# Patient Record
Sex: MALE | Race: ASIAN | NOT HISPANIC OR LATINO | Employment: FULL TIME | URBAN - METROPOLITAN AREA
[De-identification: names, ages, dates, MRNs, and addresses within clinical notes are randomized per-mention and may not be internally consistent; named-entity substitution may affect disease eponyms.]

---

## 2017-08-26 ENCOUNTER — HOSPITAL ENCOUNTER (EMERGENCY)
Facility: HOSPITAL | Age: 47
Discharge: HOME | End: 2017-08-26
Attending: EMERGENCY MEDICINE
Payer: COMMERCIAL

## 2017-08-26 VITALS
RESPIRATION RATE: 16 BRPM | WEIGHT: 130 LBS | TEMPERATURE: 98 F | HEIGHT: 64 IN | OXYGEN SATURATION: 98 % | SYSTOLIC BLOOD PRESSURE: 127 MMHG | DIASTOLIC BLOOD PRESSURE: 80 MMHG | HEART RATE: 55 BPM | BODY MASS INDEX: 22.2 KG/M2

## 2017-08-26 DIAGNOSIS — R19.7 DIARRHEA, UNSPECIFIED TYPE: ICD-10-CM

## 2017-08-26 DIAGNOSIS — M54.50 LUMBAR BACK PAIN: ICD-10-CM

## 2017-08-26 DIAGNOSIS — S39.012A LUMBAR STRAIN, INITIAL ENCOUNTER: Primary | ICD-10-CM

## 2017-08-26 PROCEDURE — 63600175 PHARM REV CODE 636 W HCPCS: Performed by: EMERGENCY MEDICINE

## 2017-08-26 PROCEDURE — 25000003 PHARM REV CODE 250: Performed by: EMERGENCY MEDICINE

## 2017-08-26 PROCEDURE — 96372 THER/PROPH/DIAG INJ SC/IM: CPT

## 2017-08-26 PROCEDURE — 99283 EMERGENCY DEPT VISIT LOW MDM: CPT | Mod: 25

## 2017-08-26 RX ORDER — IBUPROFEN 800 MG/1
800 TABLET ORAL 3 TIMES DAILY
COMMUNITY
End: 2017-09-28 | Stop reason: ALTCHOICE

## 2017-08-26 RX ORDER — ONDANSETRON 4 MG/1
4 TABLET, ORALLY DISINTEGRATING ORAL
Status: COMPLETED | OUTPATIENT
Start: 2017-08-26 | End: 2017-08-26

## 2017-08-26 RX ORDER — HYDROCODONE BITARTRATE AND ACETAMINOPHEN 5; 325 MG/1; MG/1
1 TABLET ORAL EVERY 4 HOURS PRN
Qty: 15 TABLET | Refills: 0 | Status: SHIPPED | OUTPATIENT
Start: 2017-08-26 | End: 2017-09-05

## 2017-08-26 RX ORDER — METHOCARBAMOL 750 MG/1
500 TABLET, FILM COATED ORAL 4 TIMES DAILY
COMMUNITY
End: 2017-09-28

## 2017-08-26 RX ORDER — BETAMETHASONE SODIUM PHOSPHATE AND BETAMETHASONE ACETATE 3; 3 MG/ML; MG/ML
12 INJECTION, SUSPENSION INTRA-ARTICULAR; INTRALESIONAL; INTRAMUSCULAR; SOFT TISSUE
Status: COMPLETED | OUTPATIENT
Start: 2017-08-26 | End: 2017-08-26

## 2017-08-26 RX ORDER — HYDROMORPHONE HYDROCHLORIDE 2 MG/ML
1 INJECTION, SOLUTION INTRAMUSCULAR; INTRAVENOUS; SUBCUTANEOUS
Status: COMPLETED | OUTPATIENT
Start: 2017-08-26 | End: 2017-08-26

## 2017-08-26 RX ORDER — CIPROFLOXACIN 500 MG/1
500 TABLET ORAL 2 TIMES DAILY
Qty: 6 TABLET | Refills: 0 | Status: SHIPPED | OUTPATIENT
Start: 2017-08-26 | End: 2017-08-29

## 2017-08-26 RX ADMIN — HYDROMORPHONE HYDROCHLORIDE 1 MG: 2 INJECTION INTRAMUSCULAR; INTRAVENOUS; SUBCUTANEOUS at 07:08

## 2017-08-26 RX ADMIN — ONDANSETRON 4 MG: 4 TABLET, ORALLY DISINTEGRATING ORAL at 07:08

## 2017-08-26 RX ADMIN — BETAMETHASONE SODIUM PHOSPHATE AND BETAMETHASONE ACETATE 12 MG: 3; 3 INJECTION, SUSPENSION INTRA-ARTICULAR; INTRALESIONAL; INTRAMUSCULAR at 07:08

## 2017-08-26 NOTE — ED TRIAGE NOTES
Pt seen in ED complaining of lower back pain. Pt stated he bent down to picked up boxes at work and that's when he hurt his back.

## 2017-08-26 NOTE — DISCHARGE INSTRUCTIONS
Lots of clear liquids.  Please use a heating pad on your back.  Rest.  Please follow-up with the primary care doctor in 3 days.  Hydrocodone for pain.  Cipro as directed.

## 2017-08-26 NOTE — ED PROVIDER NOTES
"Encounter Date: 8/26/2017    SCRIBE #1 NOTE: I, Gwendolyn Esau, am scribing for, and in the presence of,  Charles Narvaez MD. I have scribed the following portions of the note - Other sections scribed: HPI and ROS.       History     Chief Complaint   Patient presents with    Back Pain     x1 day; reports taking advil but no relief; pt also received Methocarbamol 750 and Ibuprofen 800 from clinic but no relief     CC: Back Pain    HPI: This 47 y.o. male with no medical history presents to the ED c/o non-radiating lower back pain that began at about 5:15 am yesterday morning. Pt states, "my lower waist is killing me", reporting that the symptoms began after he attempted to  a box. Symptoms are acute, constant and severe (10/10). He reports taking Ibuprofen and Flexeril for treatment without any relief. Pt denies any falls or trauma. He also c/o experiencing 2-3x episodes of diarrhea a day for the last 1x week, noting that the stool is "watery". Pt denies bowel or bladder incontinence, fever, chills, headache, ear pain, sore throat, chest pain, cough and rash. Pt also denies smoking and alcohol usage. No other associated symptoms.           The history is provided by the patient. No  was used.     Review of patient's allergies indicates:  No Known Allergies  History reviewed. No pertinent past medical history.  History reviewed. No pertinent surgical history.  History reviewed. No pertinent family history.  Social History   Substance Use Topics    Smoking status: Never Smoker    Smokeless tobacco: Never Used    Alcohol use No     Review of Systems   Constitutional: Negative for chills and fever.   HENT: Negative for ear pain, rhinorrhea and sore throat.    Eyes: Negative for redness.   Respiratory: Negative for cough.    Cardiovascular: Negative for chest pain.   Gastrointestinal: Positive for diarrhea (2-3x episodes a day). Negative for abdominal pain, nausea and vomiting. "   Genitourinary: Negative for dysuria.   Musculoskeletal: Positive for back pain (lower).   Skin: Negative for color change and rash.   Neurological: Negative for syncope, weakness and headaches.       Physical Exam     Initial Vitals [08/26/17 0615]   BP Pulse Resp Temp SpO2   (!) 141/81 65 18 97.7 °F (36.5 °C) 98 %      MAP       101         Physical Exam  The patient was examined specifically for the following:   General:No significant distress, Good color, Warm and dry. Head and neck:Scalp atraumatic, Neck supple. Neurological:Appropriate conversation, Gross motor deficits. Eyes:Conjugate gaze, Clear corneas. ENT: No epistaxis. Cardiac: Regular rate and rhythm, Grossly normal heart tones. Pulmonary: Wheezing, Rales. Gastrointestinal: Abdominal tenderness, Abdominal distention. Musculoskeletal: Extremity deformity, Apparent pain with range of motion of the joints. Skin: Rash.   The findings on examination were normal except for the following: The patient is splinting his lumbar back.  He is mildly diffuse tenderness of the lumbar back.  He is wearing a brace.  The lungs are clear and free wheezing rales rubs and rhonchi.  Heart tones are normal.  The patient has regular rate and rhythm.  The abdomen is nontender.  Lower extremities are nontender.  There is no pale range of motion of any joints.  Lower extremity neurologic examination is unremarkable.  The pain is intensely positional.  ED Course   Procedures  Labs Reviewed - No data to display       Medical decision making: Given the above, this patient presents emergency with lumbar back pain that had its onset after he tried to lift a heavy box.  He has no lower extremity neurologic deficits bowel or bladder symptoms.  I doubt cauda equina syndrome.  Lumbar disc disease as the differential with sprain and strain of the lumbar back.  I doubt fracture.  There is no history of trauma.  I will treat this patient with IM steroids and hydrocodone.  Patient is having  3 watery bowel movements a day for the last 7 days.  This does not quite meet the threshold for diarrhea.  He relates he cannot give a stool sample.  I will treat him empirically with Cipro for 3 days.  X-Rays:   Independently Interpreted Readings:   Other Readings:  Services of the lumbar spine failed to reveal significant abnormalities.               Scribe Attestation:   Scribe #1: I performed the above scribed service and the documentation accurately describes the services I performed. I attest to the accuracy of the note.    Attending Attestation:           Physician Attestation for Scribe:  Physician Attestation Statement for Scribe #1: I, Charles Narvaez MD, reviewed documentation, as scribed by Gwendolyn Cifuentes in my presence, and it is both accurate and complete.                 ED Course     Clinical Impression:   The primary encounter diagnosis was Lumbar strain, initial encounter. Diagnoses of Diarrhea, unspecified type and Lumbar back pain were also pertinent to this visit.                           Charles Narvaez MD  08/26/17 0895

## 2017-09-21 ENCOUNTER — OFFICE VISIT (OUTPATIENT)
Dept: URGENT CARE | Facility: CLINIC | Age: 47
End: 2017-09-21
Payer: COMMERCIAL

## 2017-09-21 VITALS
HEART RATE: 69 BPM | TEMPERATURE: 98 F | WEIGHT: 134 LBS | SYSTOLIC BLOOD PRESSURE: 136 MMHG | BODY MASS INDEX: 22.88 KG/M2 | DIASTOLIC BLOOD PRESSURE: 86 MMHG | HEIGHT: 64 IN

## 2017-09-21 DIAGNOSIS — Z02.83 ENCOUNTER FOR DRUG SCREENING: ICD-10-CM

## 2017-09-21 DIAGNOSIS — S39.012A LUMBAR STRAIN, INITIAL ENCOUNTER: Primary | ICD-10-CM

## 2017-09-21 DIAGNOSIS — Z13.39 ALCOHOL SCREENING: ICD-10-CM

## 2017-09-21 LAB — POC BREATH ALCOHOL: NEGATIVE

## 2017-09-21 PROCEDURE — 3008F BODY MASS INDEX DOCD: CPT | Mod: S$GLB,,, | Performed by: PHYSICIAN ASSISTANT

## 2017-09-21 PROCEDURE — 82075 ASSAY OF BREATH ETHANOL: CPT | Mod: S$GLB,,, | Performed by: PREVENTIVE MEDICINE

## 2017-09-21 PROCEDURE — 99203 OFFICE O/P NEW LOW 30 MIN: CPT | Mod: S$GLB,,, | Performed by: PHYSICIAN ASSISTANT

## 2017-09-21 PROCEDURE — 80305 DRUG TEST PRSMV DIR OPT OBS: CPT | Mod: S$GLB,,, | Performed by: PREVENTIVE MEDICINE

## 2017-09-21 RX ORDER — NAPROXEN 500 MG/1
500 TABLET ORAL 2 TIMES DAILY
COMMUNITY
End: 2017-09-28 | Stop reason: SDUPTHER

## 2017-09-21 RX ORDER — HYDROCODONE BITARTRATE AND ACETAMINOPHEN 5; 325 MG/1; MG/1
1 TABLET ORAL EVERY 6 HOURS PRN
COMMUNITY
End: 2023-10-27

## 2017-09-21 NOTE — LETTER
[facility] [place]  [address], [address]  [place] [facility] [place] [address]  Phone: [phone]  Fax: [phone]    Pt Name: Mehul Randhawa  Injury Date: 8/25/2017   Employee ID:  Date : 09/21/2017   Company: [employer]            Appointment Time: 08:25 AM Arrived:  8:40 AM CDT   Physician: Arnoldo Alberts PA-C Check out:11:30 AM           Office Treatment: Mehul was seen today for back pain.    Diagnoses and all orders for this visit:    Lumbar strain, initial encounter    Encounter for drug screening    Alcohol screening  -     POCT alcohol breath test       Patient Instructions: PT to be scheduled once authorized, Daily home exercises/warm soaks      WORK STATUS: LIGHT DUTY   No lifting/pushing/pulling more than 10 lbs,  Avoid frequent bending/lifting/twisting,  Avoid climbing/kneeling/squatting       Return Appointment: 9/28/2017@10AM

## 2017-09-21 NOTE — PROGRESS NOTES
Subjective:       Patient ID: Mehul Randhawa is a 47 y.o. male.    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 He reports while packing a box with the belts, lifting, carrying, and bending he injured his low back. He c/o constant low back pain with intermittent bilateral hip/groin tingling and pinching sensation that is worst with prolonged sitting and standing, and bending. Pt reports going to ED on 8/26, and followed up with his PCP. Pt states that he had an MRI of his back at [facility]. He states that he has been gradually getting better since doi. Sn: pt concerned about kidney stones. ajd      Back Pain   The current episode started 1 to 4 weeks ago. The problem occurs constantly. The problem has been gradually improving since onset. The pain is present in the lumbar spine. The quality of the pain is described as aching and stabbing. The pain is at a severity of 5/10. The pain is moderate. The symptoms are aggravated by bending, lying down, standing and sitting. Pertinent negatives include no abdominal pain, bladder incontinence, bowel incontinence, chest pain, dysuria, fever, numbness or paresthesias. He has tried NSAIDs and muscle relaxant for the symptoms. The treatment provided mild relief.     Review of Systems   Constitution: Negative for chills, fever and malaise/fatigue.   HENT: Negative for hearing loss and nosebleeds.    Eyes: Negative for blurred vision and redness.   Cardiovascular: Negative for chest pain and syncope.   Respiratory: Negative for cough and shortness of breath.    Skin: Negative for itching and rash.   Musculoskeletal: Positive for back pain. Negative for joint pain, muscle cramps, muscle weakness and stiffness.   Gastrointestinal: Negative for abdominal pain and bowel incontinence.   Genitourinary: Negative for bladder incontinence, dysuria, hematuria and urgency.   Neurological:  Negative for disturbances in coordination, numbness and paresthesias.   Psychiatric/Behavioral: The patient is not nervous/anxious.    All other systems reviewed and are negative.      Objective:      Physical Exam   Constitutional: He appears well-developed and well-nourished. He is active. No distress.   HENT:   Head: Normocephalic and atraumatic.   Right Ear: Hearing and external ear normal.   Left Ear: Hearing and external ear normal.   Nose: Nose normal. No nasal deformity. No epistaxis.   Mouth/Throat: Oropharynx is clear and moist and mucous membranes are normal.   Eyes: Conjunctivae and lids are normal. Right conjunctiva is not injected. Left conjunctiva is not injected.   Neck: Trachea normal and normal range of motion. No spinous process tenderness and no muscular tenderness present.   Cardiovascular: Intact distal pulses and normal pulses.    Pulses:       Dorsalis pedis pulses are 2+ on the right side, and 2+ on the left side.        Posterior tibial pulses are 2+ on the right side, and 2+ on the left side.   Pulmonary/Chest: Effort normal. No stridor. No respiratory distress.   Abdominal: Normal appearance.   Musculoskeletal:        Cervical back: Normal.        Thoracic back: Normal.        Lumbar back: He exhibits decreased range of motion and pain (with range of motion activity). He exhibits no tenderness, no deformity and normal pulse.   Neurological: He is alert. He has normal strength and normal reflexes. No sensory deficit. GCS eye subscore is 4. GCS verbal subscore is 5. GCS motor subscore is 6.   Reflex Scores:       Patellar reflexes are 2+ on the right side and 2+ on the left side.       Achilles reflexes are 2+ on the right side and 2+ on the left side.  SLR negative bilaterally.    Skin: Skin is warm, dry and intact. No abrasion and no bruising noted.   Psychiatric: He has a normal mood and affect. His speech is normal and behavior is normal. Thought content normal. Cognition and memory are  normal. He is attentive.   Nursing note and vitals reviewed.      Assessment:       1. Lumbar strain, initial encounter    2. Encounter for drug screening    3. Alcohol screening        Plan:       Continue Naproxen twice daily. Take pain meds off duty only.     I reviewed his MRI which shows no significant abnormalities.      Patient Instructions: PT to be scheduled once authorized, Daily home exercises/warm soaks   Restrictions: No lifting/pushing/pulling more than 10 lbs, Avoid frequent bending/lifting/twisting, Avoid climbing/kneeling/squatting  Return in about 7 days (around 9/28/2017).

## 2017-09-21 NOTE — PATIENT INSTRUCTIONS
Back Sprain or Strain    Injury to the muscles (strain) or ligaments (sprain) around the spine can be troubling. Injury may occur after a sudden forceful twisting or bending force such as in a car accident, after a simple awkward movement, or after lifting something heavy with poor body positioning. In any case, muscle spasm is often present and adds to the pain.  Thankfully, most people feel better in 1 to 2 weeks, and most of the rest in 1 to 2 months. Most people can remain active. Unless you had a forceful or traumatic physical injury such as a car accident or fall, X-rays may not be ordered for the first evaluation of a back sprain or strain. If pain continues and does not respond to medical treatment, your healthcare provider may then order X-rays and other tests.  Home care  The following guidelines will help you care for your injury at home:  · When in bed, try to find a comfortable position. A firm mattress is best. Try lying flat on your back with pillows under your knees. You can also try lying on your side with your knees bent up toward your chest and a pillow between your knees.  · Don't sit for long periods. Try not to take long car rides or take other trips that have you sitting for a long time. This puts more stress on the lower back than standing or walking.  · During the first 24 to 72 hours after an injury or flare-up, apply an ice pack to the painful area for 20 minutes. Then remove it for 20 minutes. Do this for 60 to 90 minutes, or several times a day. This will reduce swelling and pain. Be sure to wrap the ice pack in a thin towel or plastic to protect your skin.  · You can start with ice, then switch to heat. Heat from a hot shower, hot bath, or heating pad reduces pain and works well for muscle spasms. Put heat on the painful area for 20 minutes, then remove for 20 minutes. Do this for 60 to 90 minutes, or several times a day. Do not use a heating pad while sleeping. It can burn the  skin.  · You can alternate the ice and heat. Talk with your healthcare provider to find out the best treatment or therapy for your back pain.  · Therapeutic massage will help relax the back muscles without stretching them.  · Be aware of safe lifting methods. Do not lift anything over 15 pounds until all of the pain is gone.  Medicines  Talk to your healthcare provider before using medicines, especially if you have other health problems or are taking other medicines.  · You may use acetaminophen or ibuprofen to control pain, unless another pain medicine was prescribed. If you have chronic conditions like diabetes, liver or kidney disease, stomach ulcers, or gastrointestinal bleeding, or are taking blood-thinner medicines, talk with your doctor before taking any medicines.  · Be careful if you are given prescription medicines, narcotics, or medicine for muscle spasm. They can cause drowsiness, and affect your coordination, reflexes, and judgment. Do not drive or operate heavy machinery when taking these types of medicines. Only take pain medicine as prescribed by your healthcare provider.  Follow-up care  Follow up with your healthcare provider, or as advised. You may need physical therapy or more tests if your symptoms get worse.  If you had X-rays your healthcare provider may be checking for any broken bones, breaks, or fractures. Bruises and sprains can sometimes hurt as much as a fracture. These injuries can take time to heal completely. If your symptoms dont improve or they get worse, talk with your healthcare provider. You may need a repeat X-ray or other tests.  Call 911  Call for emergency care if any of the following occur:  · Trouble breathing  · Confused  · Very drowsy or trouble awakening  · Fainting or loss of consciousness  · Rapid or very slow heart rate  · Loss of bowel or bladder control  When to seek medical advice  Call your healthcare provider right away if any of the following occur:  · Pain  gets worse or spreads to your arms or legs  · Weakness or numbness in one or both arms or legs  · Numbness in the groin or genital area  Date Last Reviewed: 6/1/2016 © 2000-2017 [employer]. [address] [place] [address]. All rights reserved. This information is not intended as a substitute for professional medical care. Always follow your healthcare professional's instructions.        Lumbar Extension (Flexibility)    1. Lie face down on your stomach, forehead on the floor. You can lie on a mat or towel.  2. Bend your arms next to your body and lift your upper body up on your forearms. Your palms and forearms should be flat on the floor. Keep your stomach and hips on the floor.  3. Hold your upper body up with your forearms for 20 seconds. Slowly lower back down to the floor.  4. Repeat 2 times, or as instructed.  Date Last Reviewed: 3/10/2016  © 7839-2062 [employer]. [address] [place] [address]. All rights reserved. This information is not intended as a substitute for professional medical care. Always follow your healthcare professional's instructions.        Lumbar Flexion (Flexibility)    5. Lie on your back on the floor, with your knees bent and your feet flat on the floor.  6. Gently pull your knees up toward your chest. Put your hands under your thighs to help pull your knees up.  7. Press your lower back down to the floor. Hold for 20 seconds.  8. Lower your legs back down to the floor and relax.  9. Repeat 2 times, or as instructed.  Date Last Reviewed: 3/10/2016  © 9643-0951 [employer]. [address] [place] [address]. All rights reserved. This information is not intended as a substitute for professional medical care. Always follow your healthcare professional's instructions.        Lumbar Rotation    10. Lie on your back on the floor, with your knees bent and your feet flat on the floor. Dont press your neck or lower back to  the floor.  11. Lean both of your knees to one side. Turn your head in the opposite direction. Keep your shoulders flat on the floor. Be gentle and dont push through pain.  12. Hold for 20 seconds. Then slowly move your knees and head in the other direction.  13. Repeat 2 to 5 times, or as instructed.   Date Last Reviewed: 3/10/2016  © 9667-4343 [employer]. [address] [place] [address]. All rights reserved. This information is not intended as a substitute for professional medical care. Always follow your healthcare professional's instructions.        Lumbar Stretch (Flexibility)    14. Lie on your back on the floor, with your knees bent and your feet flat on the floor. Dont press your neck or lower back to the floor.  15. Pull one knee up toward your chest. Clasp your hands under your thigh to help pull.  16. Hold for 30 to 60 seconds. Lower your leg back down to the floor.  17. Repeat 2 times, or as instructed.  18. Switch legs and repeat.  Date Last Reviewed: 3/10/2016  © 1268-3884 [employer]. [address] [place] [address]. All rights reserved. This information is not intended as a substitute for professional medical care. Always follow your healthcare professional's instructions.        Understanding Lumbosacral Strain    Lumbosacral strain is a medical term for an injury that causes low back pain. The lumbosacral area (low back) is between the bottom of the ribcage and the top of the buttocks. A strain is tearing of muscles and tendons. These tears can be very small but still cause pain.  How a lumbosacral strain happens  Muscles and tendons connected to the spine can be strained in a number of ways:  · Sitting or standing in the same position for long periods of time. This can harm the low back over time. Poor posture can make low back pain more likely.  · Moving the muscles and tendons past their usual range of motion. This can cause a sudden injury.  This can happen when you twist, bend over, or lift something heavy. Not using correct technique for sports or tasks like lifting can make back injury more likely.  · Accidents or falls  Lumbosacral strain can be caused by other problems, but these are less common.  Symptoms of lumbosacral strain  Symptoms may include:  · Pain in the back, often on one side  · Pain that gets worse with movement and gets better with rest  · Inability to move as freely as usual  · Swelling, slight redness, and skin warmth in the painful area  Treatment for lumbosacral strain  Low back pain often goes away by itself within several weeks. But it often comes back. Treatment focuses on reducing pain and avoiding further injury. Bed rest is usually not recommended for low back pain. Treatments may include:  · Avoiding or changing the action that caused the problem. This helps prevent injuring the tissues again.  · Prescription or over-the-counter pain medicines. These help reduce inflammation, swelling, and pain.  · Cold or heat packs. These help reduce pain and swelling.  · Stretching and other exercises. These improve flexibility and strength.  · Physical therapy. This usually includes exercises and other treatments.  · Injections of medicine. This may relieve symptoms.  If these treatments do not relieve symptoms, your healthcare provider may order imaging tests to learn more about the problem. Sometimes you may need surgery.  Possible complications of lumbosacral strain  If the cause of the pain is not addressed, symptoms may return or get worse. Follow your healthcare providers instructions on lifestyle changes and treating your back.     When to call your healthcare provider  Call your healthcare provider right away if you have any of these:  · Fever of 100.4°F (38°C) or higher, or as directed  · Numbness, tingling, or weakness  · Problems with bowel or bladder control, or problems having sex  · Pain that does not go away, or gets  worse  · New symptoms   Date Last Reviewed: 3/10/2016  © 7597-0866 [employer]. [address] [place] [address], [place] [address]. All rights reserved. This information is not intended as a substitute for professional medical care. Always follow your healthcare professional's instructions.

## 2017-09-28 ENCOUNTER — OFFICE VISIT (OUTPATIENT)
Dept: URGENT CARE | Facility: CLINIC | Age: 47
End: 2017-09-28
Payer: COMMERCIAL

## 2017-09-28 DIAGNOSIS — S39.012D LUMBAR STRAIN, SUBSEQUENT ENCOUNTER: Primary | ICD-10-CM

## 2017-09-28 PROCEDURE — 99214 OFFICE O/P EST MOD 30 MIN: CPT | Mod: S$GLB,,, | Performed by: PHYSICIAN ASSISTANT

## 2017-09-28 PROCEDURE — 3008F BODY MASS INDEX DOCD: CPT | Mod: S$GLB,,, | Performed by: PHYSICIAN ASSISTANT

## 2017-09-28 RX ORDER — NAPROXEN 500 MG/1
500 TABLET ORAL 2 TIMES DAILY WITH MEALS
Qty: 30 TABLET | Refills: 0 | Status: SHIPPED | OUTPATIENT
Start: 2017-09-28 | End: 2017-10-19 | Stop reason: SDUPTHER

## 2017-09-28 RX ORDER — ERGOCALCIFEROL 1.25 MG/1
CAPSULE ORAL
COMMUNITY
Start: 2017-09-05 | End: 2023-10-27

## 2017-09-28 RX ORDER — CYCLOBENZAPRINE HCL 10 MG
TABLET ORAL
COMMUNITY
Start: 2017-08-29 | End: 2017-09-28 | Stop reason: ALTCHOICE

## 2017-09-28 RX ORDER — ORPHENADRINE CITRATE 100 MG/1
100 TABLET, EXTENDED RELEASE ORAL 2 TIMES DAILY PRN
Qty: 20 TABLET | Refills: 0 | Status: SHIPPED | OUTPATIENT
Start: 2017-09-28 | End: 2017-10-08

## 2017-09-28 NOTE — PROGRESS NOTES
Subjective:       Patient ID: Mehul Randhawa is a 47 y.o. male.    Chief Complaint: Back Pain    Patient present for f/u for back pain (8/25/2017) He c/o intermittent low back pain. Pt has been wearing a back brace for support while working. He states that after about 2 hours of wearing, he begins to have back pain.      Back Pain   The current episode started more than 1 month ago. The problem occurs intermittently. The problem is unchanged. The pain is present in the lumbar spine. The pain is at a severity of 5/10. The pain is moderate. The symptoms are aggravated by bending, standing and sitting. Pertinent negatives include no abdominal pain, bladder incontinence, bowel incontinence, chest pain, dysuria, fever, numbness or paresthesias. He has tried NSAIDs, muscle relaxant and analgesics for the symptoms. The treatment provided moderate relief.     Review of Systems   Constitution: Negative for chills, fever and malaise/fatigue.   HENT: Negative for hearing loss and nosebleeds.    Eyes: Negative for blurred vision and redness.   Cardiovascular: Negative for chest pain and syncope.   Respiratory: Negative for cough and shortness of breath.    Skin: Negative for itching and rash.   Musculoskeletal: Positive for back pain. Negative for joint pain, muscle cramps, muscle weakness and stiffness.   Gastrointestinal: Negative for abdominal pain and bowel incontinence.   Genitourinary: Negative for bladder incontinence, dysuria, hematuria and urgency.   Neurological: Negative for disturbances in coordination, numbness and paresthesias.   Psychiatric/Behavioral: The patient is not nervous/anxious.    All other systems reviewed and are negative.      Objective:      Physical Exam   Constitutional: He appears well-developed and well-nourished. He is active. No distress.   HENT:   Head: Normocephalic and atraumatic.   Right Ear: Hearing and external ear normal.   Left Ear: Hearing and external ear normal.   Nose: Nose normal. No  nasal deformity. No epistaxis.   Mouth/Throat: Oropharynx is clear and moist and mucous membranes are normal.   Eyes: Conjunctivae and lids are normal. Right conjunctiva is not injected. Left conjunctiva is not injected.   Neck: Trachea normal and normal range of motion. No spinous process tenderness and no muscular tenderness present.   Cardiovascular: Intact distal pulses and normal pulses.    Pulses:       Dorsalis pedis pulses are 2+ on the right side, and 2+ on the left side.        Posterior tibial pulses are 2+ on the right side, and 2+ on the left side.   Pulmonary/Chest: Effort normal. No stridor. No respiratory distress.   Abdominal: Normal appearance.   Musculoskeletal:        Cervical back: Normal.        Thoracic back: Normal.        Lumbar back: He exhibits decreased range of motion and tenderness. He exhibits no deformity and normal pulse.   Neurological: He is alert. He has normal strength and normal reflexes. No sensory deficit. GCS eye subscore is 4. GCS verbal subscore is 5. GCS motor subscore is 6.   Reflex Scores:       Patellar reflexes are 2+ on the right side and 2+ on the left side.       Achilles reflexes are 2+ on the right side and 2+ on the left side.  SLR negative bilaterally.    Skin: Skin is warm, dry and intact. No abrasion and no bruising noted.   Psychiatric: He has a normal mood and affect. His speech is normal and behavior is normal. Thought content normal. Cognition and memory are normal. He is attentive.   Nursing note reviewed.      Assessment:       1. Lumbar strain, subsequent encounter        Plan:           Medications Ordered This Encounter      naproxen (NAPROSYN) 500 MG tablet          Sig: Take 1 tablet (500 mg total) by mouth 2 (two) times daily with meals.          Dispense:  30 tablet          Refill:  0      orphenadrine (NORFLEX) 100 mg tablet          Sig: Take 1 tablet (100 mg total) by mouth 2 (two) times daily as needed for Muscle spasms or Pain. Take off duty  only. May cause drowsiness.          Dispense:  20 tablet          Refill:  0  Patient Instructions: Daily home exercises/warm soaks, PT to be scheduled once authorized   Restrictions: Avoid frequent bending/lifting/twisting, No lifting/pushing/pulling more than 10 lbs, Avoid climbing/kneeling/squatting  Return in about 7 days (around 10/5/2017).

## 2017-09-28 NOTE — LETTER
[facility]  [address], [address]  [facility] [address]  Phone: [phone]  Fax: [phone]    Pt Name: Mehul Randhawa  Injury Date: 8/25/2017   Employee ID:  Date: 09/28/2017   Company: [employer]            Appointment Time: 10:00 AM Arrived: 10:00 AM CDT   Physician: Arnoldo Alberts PA-C Check out: 11:09 AM           Office Treatment: Mehul was seen today for back pain.    Diagnoses and all orders for this visit:    Lumbar strain, subsequent encounter  -     naproxen (NAPROSYN) 500 MG tablet; Take 1 tablet (500 mg total) by mouth 2 (two) times daily with meals.  -     orphenadrine (NORFLEX) 100 mg tablet; Take 1 tablet (100 mg total) by mouth 2 (two) times daily as needed for Muscle spasms or Pain. Take off duty only. May cause drowsiness.       Patient Instructions: Daily home exercises/warm soaks, PT to be scheduled once authorized    WORK STATUS: LIGHT DUTY  Avoid frequent bending/lifting/twisting,   No lifting/pushing/pulling more than 10 lbs   Avoid climbing/kneeling/squatting       Return Appointment: 10/5/2017@10:00AM

## 2017-10-05 ENCOUNTER — OFFICE VISIT (OUTPATIENT)
Dept: URGENT CARE | Facility: CLINIC | Age: 47
End: 2017-10-05
Payer: COMMERCIAL

## 2017-10-05 DIAGNOSIS — S39.012D STRAIN OF LUMBAR REGION, SUBSEQUENT ENCOUNTER: Primary | ICD-10-CM

## 2017-10-05 PROCEDURE — 99214 OFFICE O/P EST MOD 30 MIN: CPT | Mod: S$GLB,,, | Performed by: PHYSICIAN ASSISTANT

## 2017-10-05 NOTE — PROGRESS NOTES
"Subjective:       Patient ID: Mehul Randhawa is a 47 y.o. male.    Chief Complaint: Back Pain    Patient presents for f/u for LBP (doi 8/25/2017) pt reports intermittent back pain with radiating to bilateral hips. He states that his pain feels "pinching, tingly" Pt reports taking rx meds, using icy hot patches and heat with mild relief. Currently 4/10 on pain scale. ajd      Back Pain   The current episode started more than 1 month ago. The problem has been gradually improving since onset. The pain is present in the lumbar spine. The pain is at a severity of 4/10. The symptoms are aggravated by bending. Associated symptoms include tingling. Pertinent negatives include no abdominal pain, bladder incontinence, bowel incontinence, chest pain, dysuria, fever, numbness or paresthesias. He has tried muscle relaxant, NSAIDs and heat for the symptoms. The treatment provided mild relief.     Review of Systems   Constitution: Negative for chills, fever and malaise/fatigue.   HENT: Negative for hearing loss and nosebleeds.    Eyes: Negative for blurred vision and redness.   Cardiovascular: Negative for chest pain and syncope.   Respiratory: Negative for cough and shortness of breath.    Skin: Negative for itching and rash.   Musculoskeletal: Positive for back pain. Negative for joint pain, muscle cramps, muscle weakness and stiffness.   Gastrointestinal: Negative for abdominal pain and bowel incontinence.   Genitourinary: Negative for bladder incontinence, dysuria, hematuria and urgency.   Neurological: Positive for tingling. Negative for disturbances in coordination, numbness and paresthesias.   Psychiatric/Behavioral: The patient is not nervous/anxious.    All other systems reviewed and are negative.      Objective:      Physical Exam   Constitutional: He appears well-developed and well-nourished. He is active. No distress.   HENT:   Head: Normocephalic and atraumatic.   Right Ear: Hearing and external ear normal.   Left Ear: " Hearing and external ear normal.   Nose: Nose normal. No nasal deformity. No epistaxis.   Mouth/Throat: Oropharynx is clear and moist and mucous membranes are normal.   Eyes: Conjunctivae and lids are normal. Right conjunctiva is not injected. Left conjunctiva is not injected.   Neck: Trachea normal and normal range of motion. No spinous process tenderness and no muscular tenderness present.   Cardiovascular: Intact distal pulses and normal pulses.    Pulses:       Dorsalis pedis pulses are 2+ on the right side, and 2+ on the left side.        Posterior tibial pulses are 2+ on the right side, and 2+ on the left side.   Pulmonary/Chest: Effort normal. No stridor. No respiratory distress.   Abdominal: Normal appearance.   Musculoskeletal:        Cervical back: Normal.        Thoracic back: Normal.        Lumbar back: He exhibits decreased range of motion. He exhibits no tenderness, no deformity and normal pulse.        Back:    Neurological: He is alert. He has normal strength and normal reflexes. No sensory deficit. GCS eye subscore is 4. GCS verbal subscore is 5. GCS motor subscore is 6.   Reflex Scores:       Patellar reflexes are 2+ on the right side and 2+ on the left side.       Achilles reflexes are 2+ on the right side and 2+ on the left side.  SLR negative bilaterally.    Skin: Skin is warm, dry and intact. No abrasion and no bruising noted.   Psychiatric: He has a normal mood and affect. His speech is normal and behavior is normal. Thought content normal. Cognition and memory are normal. He is attentive.   Nursing note reviewed.      Assessment:       1. Strain of lumbar region, subsequent encounter        Plan:       Continue naproxen twice daily. Continue orphenadrine twice daily as needed (off duty only).      Patient Instructions: Daily home exercises/warm soaks, Begin Physical Therapy   Restrictions: No lifting/pushing/pulling more than 10 lbs, Avoid frequent bending/lifting/twisting, Avoid  climbing/kneeling/squatting  Return in about 7 days (around 10/12/2017).

## 2017-10-05 NOTE — LETTER
[facility]  [address], [address]  [place] [address]  Phone: [phone]  Fax: [phone]    Pt Name: Mehul Randhawa  Injury Date: 8/25/17   Employee ID:  Date  10/05/2017   Company: [employer]            Appointment Time: 010 AM Arrived: 10:00 AM CDT   Physician: Arnoldo Alberts PA-C OUT:11:09 AM           Office Treatment: Mehul was seen today for back pain.    Diagnoses and all orders for this visit:    Strain of lumbar region, subsequent encounter       Patient Instructions: Daily home exercises/warm soaks, Begin Physical Therapy      WORK STATUS: LIGHT DUTY   No lifting/pushing/pulling more than 10 lbs,   Avoid frequent bending/lifting/twisting,   Avoid climbing/kneeling/squatting       Return Appointment: 10/12/2017@9:30AM

## 2017-10-12 ENCOUNTER — OFFICE VISIT (OUTPATIENT)
Dept: URGENT CARE | Facility: CLINIC | Age: 47
End: 2017-10-12
Payer: COMMERCIAL

## 2017-10-12 DIAGNOSIS — S39.012D STRAIN OF LUMBAR REGION, SUBSEQUENT ENCOUNTER: Primary | ICD-10-CM

## 2017-10-12 PROCEDURE — 99213 OFFICE O/P EST LOW 20 MIN: CPT | Mod: S$GLB,,, | Performed by: NURSE PRACTITIONER

## 2017-10-12 NOTE — LETTER
[facility]  [address], [address]  [place] [address]  Phone: [phone]  Fax: [phone]    Pt Name: Mehul Randhawa  Injury Date: 8/25/17   Employee ID:  Date: 10/12/2017   Company: [employer]            Appointment Time: 09:30 AM Arrived:  9:30 AM CDT   Physician: Salomon Chinchilla NP Out: 10:20 AM           Office Treatment: Mehul was seen today for back pain.    Diagnoses and all orders for this visit:    Strain of lumbar region, subsequent encounter       Patient Instructions: Daily home exercises/warm soaks, Apply ice 24-48 hours then apply heat/warm soaks, Continue Physical Therapy      WORK STATUS: LIGHT DUTY  Avoid frequent bending/lifting/twisting,   No lifting/pushing/pulling more than 10 lbs,   Sit or stand as needed       Return Appointment: 10/19/2017@10:30AM

## 2017-10-12 NOTE — PROGRESS NOTES
"Subjective:       Patient ID: Mehul Randhawa is a 47 y.o. male.    Chief Complaint: Back Pain    F/u LBP doi 8/25/17 Pt states he's "getting better" He continues to c/o pinching in low back with bending, and intermittent burning in bilateral hips.      Back Pain   Chronicity: REPORTS HE IS GETTING BETTER AND PT IS HELPING. The current episode started more than 1 month ago. The problem occurs intermittently. The problem has been gradually improving since onset. The pain is present in the lumbar spine. The quality of the pain is described as aching, burning and stabbing. The pain is at a severity of 1/10. The symptoms are aggravated by bending, lying down and position. Stiffness is present all day. Pertinent negatives include no abdominal pain, bladder incontinence, bowel incontinence, chest pain, dysuria, fever, numbness or weakness. He has tried home exercises and NSAIDs (PT) for the symptoms. The treatment provided moderate relief.     Review of Systems   Constitution: Negative for chills, fever, weakness and malaise/fatigue.   Cardiovascular: Negative for chest pain and dyspnea on exertion.   Respiratory: Negative for cough and shortness of breath.    Skin: Negative for color change and rash.   Musculoskeletal: Positive for back pain, myalgias and stiffness. Negative for muscle cramps and muscle weakness.   Gastrointestinal: Negative for abdominal pain and bowel incontinence.   Genitourinary: Negative for bladder incontinence, dysuria, hematuria and urgency.   Neurological: Negative for disturbances in coordination, numbness and sensory change.   Psychiatric/Behavioral: Negative for depression. The patient is not nervous/anxious.    All other systems reviewed and are negative.      Objective:      Physical Exam   Constitutional: He is oriented to person, place, and time. He appears well-developed and well-nourished.   Cardiovascular: Normal rate and regular rhythm.    Musculoskeletal: He exhibits tenderness.        " Lumbar back: He exhibits decreased range of motion, tenderness and pain. He exhibits no spasm.        Back:    Neurological: He is alert and oriented to person, place, and time. He displays normal reflexes. No cranial nerve deficit or sensory deficit. He exhibits normal muscle tone. Coordination normal. GCS eye subscore is 4. GCS verbal subscore is 5. GCS motor subscore is 6.   Reflex Scores:       Patellar reflexes are 2+ on the right side and 2+ on the left side.       Achilles reflexes are 2+ on the right side and 2+ on the left side.  Skin: Skin is warm and dry.       Assessment:       1. Strain of lumbar region, subsequent encounter        Plan:       Current Outpatient Prescriptions on File Prior to Visit   Medication Sig Dispense Refill    hydrocodone-acetaminophen 5-325mg (NORCO) 5-325 mg per tablet Take 1 tablet by mouth every 6 (six) hours as needed for Pain.      naproxen (NAPROSYN) 500 MG tablet Take 1 tablet (500 mg total) by mouth 2 (two) times daily with meals. 30 tablet 0    VITAMIN D2 50,000 unit capsule        No current facility-administered medications on file prior to visit.      Mehul was seen today for back pain.    Diagnoses and all orders for this visit:    Strain of lumbar region, subsequent encounter    CONTINUE MEDICATION AS DIRECTED.  CALL WITH ANY QUESTIONS OR CONCERNS     Patient Instructions: Daily home exercises/warm soaks, Apply ice 24-48 hours then apply heat/warm soaks, Continue Physical Therapy   Restrictions: Avoid frequent bending/lifting/twisting, No lifting/pushing/pulling more than 10 lbs, Sit or stand as needed  Return in about 7 days (around 10/19/2017).

## 2017-10-12 NOTE — PATIENT INSTRUCTIONS
Back Sprain or Strain    Injury to the muscles (strain) or ligaments (sprain) around the spine can be troubling. Injury may occur after a sudden forceful twisting or bending force such as in a car accident, after a simple awkward movement, or after lifting something heavy with poor body positioning. In any case, muscle spasm is often present and adds to the pain.  Thankfully, most people feel better in 1 to 2 weeks, and most of the rest in 1 to 2 months. Most people can remain active. Unless you had a forceful or traumatic physical injury such as a car accident or fall, X-rays may not be ordered for the first evaluation of a back sprain or strain. If pain continues and does not respond to medical treatment, your healthcare provider may then order X-rays and other tests.  Home care  The following guidelines will help you care for your injury at home:  · When in bed, try to find a comfortable position. A firm mattress is best. Try lying flat on your back with pillows under your knees. You can also try lying on your side with your knees bent up toward your chest and a pillow between your knees.  · Don't sit for long periods. Try not to take long car rides or take other trips that have you sitting for a long time. This puts more stress on the lower back than standing or walking.  · During the first 24 to 72 hours after an injury or flare-up, apply an ice pack to the painful area for 20 minutes. Then remove it for 20 minutes. Do this for 60 to 90 minutes, or several times a day. This will reduce swelling and pain. Be sure to wrap the ice pack in a thin towel or plastic to protect your skin.  · You can start with ice, then switch to heat. Heat from a hot shower, hot bath, or heating pad reduces pain and works well for muscle spasms. Put heat on the painful area for 20 minutes, then remove for 20 minutes. Do this for 60 to 90 minutes, or several times a day. Do not use a heating pad while sleeping. It can burn the  skin.  · You can alternate the ice and heat. Talk with your healthcare provider to find out the best treatment or therapy for your back pain.  · Therapeutic massage will help relax the back muscles without stretching them.  · Be aware of safe lifting methods. Do not lift anything over 15 pounds until all of the pain is gone.  Medicines  Talk to your healthcare provider before using medicines, especially if you have other health problems or are taking other medicines.  · You may use acetaminophen or ibuprofen to control pain, unless another pain medicine was prescribed. If you have chronic conditions like diabetes, liver or kidney disease, stomach ulcers, or gastrointestinal bleeding, or are taking blood-thinner medicines, talk with your doctor before taking any medicines.  · Be careful if you are given prescription medicines, narcotics, or medicine for muscle spasm. They can cause drowsiness, and affect your coordination, reflexes, and judgment. Do not drive or operate heavy machinery when taking these types of medicines. Only take pain medicine as prescribed by your healthcare provider.  Follow-up care  Follow up with your healthcare provider, or as advised. You may need physical therapy or more tests if your symptoms get worse.  If you had X-rays your healthcare provider may be checking for any broken bones, breaks, or fractures. Bruises and sprains can sometimes hurt as much as a fracture. These injuries can take time to heal completely. If your symptoms dont improve or they get worse, talk with your healthcare provider. You may need a repeat X-ray or other tests.  Call 911  Call for emergency care if any of the following occur:  · Trouble breathing  · Confused  · Very drowsy or trouble awakening  · Fainting or loss of consciousness  · Rapid or very slow heart rate  · Loss of bowel or bladder control  When to seek medical advice  Call your healthcare provider right away if any of the following occur:  · Pain  gets worse or spreads to your arms or legs  · Weakness or numbness in one or both arms or legs  · Numbness in the groin or genital area  Date Last Reviewed: 6/1/2016 © 2000-2017 [employer]. [address] [place] [address]. All rights reserved. This information is not intended as a substitute for professional medical care. Always follow your healthcare professional's instructions.        Lumbar Extension (Flexibility)    1. Lie face down on your stomach, forehead on the floor. You can lie on a mat or towel.  2. Bend your arms next to your body and lift your upper body up on your forearms. Your palms and forearms should be flat on the floor. Keep your stomach and hips on the floor.  3. Hold your upper body up with your forearms for 20 seconds. Slowly lower back down to the floor.  4. Repeat 2 times, or as instructed.  Date Last Reviewed: 3/10/2016  © 7267-8518 [employer]. [address] [place] [address]. All rights reserved. This information is not intended as a substitute for professional medical care. Always follow your healthcare professional's instructions.        Lumbar Flexion (Flexibility)    5. Lie on your back on the floor, with your knees bent and your feet flat on the floor.  6. Gently pull your knees up toward your chest. Put your hands under your thighs to help pull your knees up.  7. Press your lower back down to the floor. Hold for 20 seconds.  8. Lower your legs back down to the floor and relax.  9. Repeat 2 times, or as instructed.  Date Last Reviewed: 3/10/2016  © 0514-6343 [employer]. [address] [place] [address]. All rights reserved. This information is not intended as a substitute for professional medical care. Always follow your healthcare professional's instructions.        Lumbar Rotation    10. Lie on your back on the floor, with your knees bent and your feet flat on the floor. Dont press your neck or lower back to  the floor.  11. Lean both of your knees to one side. Turn your head in the opposite direction. Keep your shoulders flat on the floor. Be gentle and dont push through pain.  12. Hold for 20 seconds. Then slowly move your knees and head in the other direction.  13. Repeat 2 to 5 times, or as instructed.   Date Last Reviewed: 3/10/2016  © 0583-9997 [employer]. [address] [place] [address]. All rights reserved. This information is not intended as a substitute for professional medical care. Always follow your healthcare professional's instructions.        Lumbar Stretch (Flexibility)    14. Lie on your back on the floor, with your knees bent and your feet flat on the floor. Dont press your neck or lower back to the floor.  15. Pull one knee up toward your chest. Clasp your hands under your thigh to help pull.  16. Hold for 30 to 60 seconds. Lower your leg back down to the floor.  17. Repeat 2 times, or as instructed.  18. Switch legs and repeat.  Date Last Reviewed: 3/10/2016  © 5684-8444 [employer]. [address] [place] [address]. All rights reserved. This information is not intended as a substitute for professional medical care. Always follow your healthcare professional's instructions.

## 2017-10-19 ENCOUNTER — OFFICE VISIT (OUTPATIENT)
Dept: URGENT CARE | Facility: CLINIC | Age: 47
End: 2017-10-19
Payer: COMMERCIAL

## 2017-10-19 DIAGNOSIS — S39.012D STRAIN OF LUMBAR REGION, SUBSEQUENT ENCOUNTER: Primary | ICD-10-CM

## 2017-10-19 DIAGNOSIS — S39.012D LUMBAR STRAIN, SUBSEQUENT ENCOUNTER: ICD-10-CM

## 2017-10-19 PROCEDURE — 99213 OFFICE O/P EST LOW 20 MIN: CPT | Mod: S$GLB,,, | Performed by: NURSE PRACTITIONER

## 2017-10-19 RX ORDER — NAPROXEN 500 MG/1
500 TABLET ORAL 2 TIMES DAILY WITH MEALS
Qty: 30 TABLET | Refills: 0 | Status: SHIPPED | OUTPATIENT
Start: 2017-10-19 | End: 2017-11-02 | Stop reason: SDUPTHER

## 2017-10-19 NOTE — PROGRESS NOTES
"Subjective:       Patient ID: Mehul Randhawa is a 47 y.o. male.    Chief Complaint: Back Pain    F/u LBP doi (8/25/17) Pt states he's "getting better" He continues to c/o pinching in low back with forward bending, and intermittent burning in bilateral hips.      Back Pain   The current episode started more than 1 month ago. The problem occurs intermittently. The problem has been gradually improving since onset. The pain is present in the lumbar spine. The quality of the pain is described as aching and burning. The pain is at a severity of 1/10. The pain is mild. The symptoms are aggravated by bending. Pertinent negatives include no abdominal pain, bladder incontinence, bowel incontinence, dysuria or numbness. He has tried NSAIDs and analgesics for the symptoms. The treatment provided moderate relief.     Review of Systems   Constitution: Negative for malaise/fatigue.   Skin: Negative for rash.   Musculoskeletal: Positive for back pain. Negative for muscle cramps, muscle weakness and stiffness.   Gastrointestinal: Negative for abdominal pain and bowel incontinence.   Genitourinary: Negative for bladder incontinence, dysuria, hematuria and urgency.   Neurological: Negative for disturbances in coordination and numbness.   All other systems reviewed and are negative.      Objective:      Physical Exam   Constitutional: He is oriented to person, place, and time. Vital signs are normal. He appears well-developed and well-nourished. He is active and cooperative. No distress.   HENT:   Head: Normocephalic and atraumatic.   Nose: Nose normal.   Mouth/Throat: Oropharynx is clear and moist and mucous membranes are normal.   Eyes: Conjunctivae and lids are normal.   Neck: Trachea normal, normal range of motion, full passive range of motion without pain and phonation normal. Neck supple.   Cardiovascular: Normal rate, regular rhythm, normal heart sounds, intact distal pulses and normal pulses.    Pulmonary/Chest: Effort normal and " breath sounds normal.   Abdominal: Soft. Normal appearance and bowel sounds are normal. He exhibits no abdominal bruit, no pulsatile midline mass and no mass.   Musculoskeletal: He exhibits no edema or deformity.        Lumbar back: He exhibits pain (C/O PAIN AT FULL FLEXION. ). He exhibits no tenderness and no bony tenderness.        Back:    NEGATIVE SLR   Neurological: He is alert and oriented to person, place, and time. He has normal strength and normal reflexes. No sensory deficit.   Reflex Scores:       Patellar reflexes are 2+ on the right side and 2+ on the left side.       Achilles reflexes are 2+ on the right side and 2+ on the left side.  Skin: Skin is warm, dry and intact. He is not diaphoretic.   Psychiatric: He has a normal mood and affect. His speech is normal and behavior is normal. Judgment and thought content normal. Cognition and memory are normal.   Nursing note and vitals reviewed.      Assessment:       1. Strain of lumbar region, subsequent encounter    2. Lumbar strain, subsequent encounter        Plan:         Medications Ordered This Encounter      naproxen (NAPROSYN) 500 MG tablet          Sig: Take 1 tablet (500 mg total) by mouth 2 (two) times daily with meals.          Dispense:  30 tablet          Refill:  0  Patient Instructions: Attention not to aggravate affected area, Daily home exercises/warm soaks, Apply ice 24-48 hours then apply heat/warm soaks, Continue Physical Therapy   Restrictions: Avoid frequent bending/lifting/twisting, Sit or stand as needed, No lifting/pushing/pulling more than 25 lbs, No Prolonged standing/walking  Return in about 2 weeks (around 11/2/2017) for RECHECK AFTER FINISHING PT.

## 2017-10-19 NOTE — LETTER
[facility] [place]  [address], [address]  [place] [address]  Phone: [phone]  Fax: [phone]    Pt Name: Mehul Randhawa  Injury Date: 8/25/2017   Employee ID: [id number] Date: 10/19/2017   Company: [employer]            Appointment Time: 10:30 AM Arrived: 10:30 AM CDT   Physician: Ying Chaves NP Check out: 12:17 PM           Office Treatment: Mehul was seen today for back pain.    Diagnoses and all orders for this visit:    Strain of lumbar region, subsequent encounter    Lumbar strain, subsequent encounter  -     naproxen (NAPROSYN) 500 MG tablet; Take 1 tablet (500 mg total) by mouth 2 (two) times daily with meals.       Patient Instructions: Attention not to aggravate affected area, Daily home exercises/warm soaks, Apply ice 24-48 hours then apply heat/warm soaks, Continue Physical Therapy    WORK STATUS: LIGHT DUTY  Avoid frequent bending/lifting/twisting,   Sit or stand as needed,   No lifting/pushing/pulling more than 25 lbs,   No Prolonged standing/walking       Return Appointment: 11/2/2017@10:30 AM

## 2017-11-02 ENCOUNTER — OFFICE VISIT (OUTPATIENT)
Dept: URGENT CARE | Facility: CLINIC | Age: 47
End: 2017-11-02
Payer: COMMERCIAL

## 2017-11-02 DIAGNOSIS — S39.012D STRAIN OF LUMBAR REGION, SUBSEQUENT ENCOUNTER: Primary | ICD-10-CM

## 2017-11-02 DIAGNOSIS — S39.012D LUMBAR STRAIN, SUBSEQUENT ENCOUNTER: ICD-10-CM

## 2017-11-02 PROCEDURE — 99213 OFFICE O/P EST LOW 20 MIN: CPT | Mod: S$GLB,,, | Performed by: NURSE PRACTITIONER

## 2017-11-02 RX ORDER — NAPROXEN 500 MG/1
500 TABLET ORAL 2 TIMES DAILY WITH MEALS
Qty: 30 TABLET | Refills: 0 | Status: SHIPPED | OUTPATIENT
Start: 2017-11-02 | End: 2017-11-10 | Stop reason: SDUPTHER

## 2017-11-02 NOTE — LETTER
[facility] [place]  [address], [address]  [facility] [address]  Phone: [phone]  Fax: [phone]    Pt Name: Mehul Randhawa  Injury Date: 08/25/2017   Employee ID: [id number] Date of Treatment: 11/02/2017   Company: [employer]            Appointment Time: 10:15 AM Arrived: 10:30 AM CDT   Physician: Salomon Chinchilla NP Time Out: 11:50 AM           Office Treatment: Mehul was seen today for back pain.    Diagnoses and all orders for this visit:    Strain of lumbar region, subsequent encounter  -     naproxen (NAPROSYN) 500 MG tablet; Take 1 tablet (500 mg total) by mouth 2 (two) times daily with meals.    Lumbar strain, subsequent encounter  -     naproxen (NAPROSYN) 500 MG tablet; Take 1 tablet (500 mg total) by mouth 2 (two) times daily with meals.       Patient Instructions: Attention not to aggravate affected area, Daily home exercises/warm soaks    Restrictions: No lifting/pushing/pulling more than 25 lbs, Avoid frequent bending/lifting/twisting, Sit or stand as needed       Return Appointment: 11/9/2017 at 0930 AM

## 2017-11-02 NOTE — PATIENT INSTRUCTIONS
Lumbar Stretch (Flexibility)    1. Lie on your back on the floor, with your knees bent and your feet flat on the floor. Dont press your neck or lower back to the floor.  2. Pull one knee up toward your chest. Clasp your hands under your thigh to help pull.  3. Hold for 30 to 60 seconds. Lower your leg back down to the floor.  4. Repeat 2 times, or as instructed.  5. Switch legs and repeat.  Date Last Reviewed: 3/10/2016  © 8662-8383 [employer]. [address] [place] [address]. All rights reserved. This information is not intended as a substitute for professional medical care. Always follow your healthcare professional's instructions.        Back Sprain or Strain    Injury to the muscles (strain) or ligaments (sprain) around the spine can be troubling. Injury may occur after a sudden forceful twisting or bending force such as in a car accident, after a simple awkward movement, or after lifting something heavy with poor body positioning. In any case, muscle spasm is often present and adds to the pain.  Thankfully, most people feel better in 1 to 2 weeks, and most of the rest in 1 to 2 months. Most people can remain active. Unless you had a forceful or traumatic physical injury such as a car accident or fall, X-rays may not be ordered for the first evaluation of a back sprain or strain. If pain continues and does not respond to medical treatment, your healthcare provider may then order X-rays and other tests.  Home care  The following guidelines will help you care for your injury at home:  · When in bed, try to find a comfortable position. A firm mattress is best. Try lying flat on your back with pillows under your knees. You can also try lying on your side with your knees bent up toward your chest and a pillow between your knees.  · Don't sit for long periods. Try not to take long car rides or take other trips that have you sitting for a long time. This puts more stress on the lower back  than standing or walking.  · During the first 24 to 72 hours after an injury or flare-up, apply an ice pack to the painful area for 20 minutes. Then remove it for 20 minutes. Do this for 60 to 90 minutes, or several times a day. This will reduce swelling and pain. Be sure to wrap the ice pack in a thin towel or plastic to protect your skin.  · You can start with ice, then switch to heat. Heat from a hot shower, hot bath, or heating pad reduces pain and works well for muscle spasms. Put heat on the painful area for 20 minutes, then remove for 20 minutes. Do this for 60 to 90 minutes, or several times a day. Do not use a heating pad while sleeping. It can burn the skin.  · You can alternate the ice and heat. Talk with your healthcare provider to find out the best treatment or therapy for your back pain.  · Therapeutic massage will help relax the back muscles without stretching them.  · Be aware of safe lifting methods. Do not lift anything over 15 pounds until all of the pain is gone.  Medicines  Talk to your healthcare provider before using medicines, especially if you have other health problems or are taking other medicines.  · You may use acetaminophen or ibuprofen to control pain, unless another pain medicine was prescribed. If you have chronic conditions like diabetes, liver or kidney disease, stomach ulcers, or gastrointestinal bleeding, or are taking blood-thinner medicines, talk with your doctor before taking any medicines.  · Be careful if you are given prescription medicines, narcotics, or medicine for muscle spasm. They can cause drowsiness, and affect your coordination, reflexes, and judgment. Do not drive or operate heavy machinery when taking these types of medicines. Only take pain medicine as prescribed by your healthcare provider.  Follow-up care  Follow up with your healthcare provider, or as advised. You may need physical therapy or more tests if your symptoms get worse.  If you had X-rays your  healthcare provider may be checking for any broken bones, breaks, or fractures. Bruises and sprains can sometimes hurt as much as a fracture. These injuries can take time to heal completely. If your symptoms dont improve or they get worse, talk with your healthcare provider. You may need a repeat X-ray or other tests.  Call 911  Call for emergency care if any of the following occur:  · Trouble breathing  · Confused  · Very drowsy or trouble awakening  · Fainting or loss of consciousness  · Rapid or very slow heart rate  · Loss of bowel or bladder control  When to seek medical advice  Call your healthcare provider right away if any of the following occur:  · Pain gets worse or spreads to your arms or legs  · Weakness or numbness in one or both arms or legs  · Numbness in the groin or genital area  Date Last Reviewed: 6/1/2016  © 8493-0269 [employer]. [address] [place] [address]. All rights reserved. This information is not intended as a substitute for professional medical care. Always follow your healthcare professional's instructions.

## 2017-11-02 NOTE — PROGRESS NOTES
Subjective:       Patient ID: Mehul Randhawa is a 47 y.o. male.    Chief Complaint: Back Pain    Patient states that his back pain has improved      Back Pain   This is a recurrent problem. The current episode started 1 to 4 weeks ago (08/25/2017). The problem occurs 2 to 4 times per day. The problem has been gradually improving since onset. The pain is present in the lumbar spine. The quality of the pain is described as aching. The pain does not radiate. The pain is at a severity of 1/10. The pain is mild. The pain is worse during the night. The symptoms are aggravated by bending and position. Pertinent negatives include no abdominal pain, bladder incontinence, bowel incontinence, chest pain, dysuria, fever, headaches, numbness or weakness. He has tried NSAIDs (PT) for the symptoms. The treatment provided mild relief.     Review of Systems   Constitution: Negative for chills, fever and weakness.   HENT: Negative for congestion and sore throat.    Eyes: Negative for blurred vision and pain.   Cardiovascular: Negative for chest pain, palpitations and syncope.   Respiratory: Negative for cough, shortness of breath and wheezing.    Endocrine: Negative for polydipsia, polyphagia and polyuria.   Skin: Negative for dry skin, itching and rash.   Musculoskeletal: Positive for back pain, myalgias and stiffness. Negative for muscle weakness.   Gastrointestinal: Negative for abdominal pain, bowel incontinence, constipation, diarrhea, nausea and vomiting.   Genitourinary: Negative for bladder incontinence, dysuria and hematuria.   Neurological: Negative for dizziness, headaches and numbness.   All other systems reviewed and are negative.      Objective:      Physical Exam   Constitutional: He is oriented to person, place, and time. He appears well-developed and well-nourished.   HENT:   Head: Normocephalic and atraumatic.   Cardiovascular: Normal rate and regular rhythm.    Pulmonary/Chest: Effort normal and breath sounds normal.    Abdominal: Soft. Bowel sounds are normal.   Musculoskeletal: He exhibits tenderness.        Lumbar back: He exhibits decreased range of motion, tenderness and pain.        Back:    Neurological: He is alert and oriented to person, place, and time. He has normal strength. He displays normal reflexes. No cranial nerve deficit or sensory deficit. He exhibits normal muscle tone. Coordination and gait normal. GCS eye subscore is 4. GCS verbal subscore is 5. GCS motor subscore is 6.   Reflex Scores:       Patellar reflexes are 2+ on the right side and 2+ on the left side.       Achilles reflexes are 2+ on the right side and 2+ on the left side.  Skin: Skin is warm, dry and intact.       Assessment:       1. Strain of lumbar region, subsequent encounter    2. Lumbar strain, subsequent encounter        Plan:       Mehul was seen today for back pain.    Diagnoses and all orders for this visit:    Strain of lumbar region, subsequent encounter  -     naproxen (NAPROSYN) 500 MG tablet; Take 1 tablet (500 mg total) by mouth 2 (two) times daily with meals.    Lumbar strain, subsequent encounter  -     naproxen (NAPROSYN) 500 MG tablet; Take 1 tablet (500 mg total) by mouth 2 (two) times daily with meals.    YOUR PROVIDER HAS REQUESTED MORE PT- CONTINUE PHYSICAL THERAPY    Medications Ordered This Encounter      naproxen (NAPROSYN) 500 MG tablet          Sig: Take 1 tablet (500 mg total) by mouth 2 (two) times daily with meals.          Dispense:  30 tablet          Refill:  0  Patient Instructions: Attention not to aggravate affected area, Daily home exercises/warm soaks   Restrictions: No lifting/pushing/pulling more than 25 lbs, Avoid frequent bending/lifting/twisting, Sit or stand as needed  Return in about 7 days (around 11/9/2017).

## 2017-11-10 ENCOUNTER — OFFICE VISIT (OUTPATIENT)
Dept: URGENT CARE | Facility: CLINIC | Age: 47
End: 2017-11-10
Payer: COMMERCIAL

## 2017-11-10 DIAGNOSIS — S39.012D LUMBAR STRAIN, SUBSEQUENT ENCOUNTER: ICD-10-CM

## 2017-11-10 DIAGNOSIS — S39.012D STRAIN OF LUMBAR REGION, SUBSEQUENT ENCOUNTER: Primary | ICD-10-CM

## 2017-11-10 PROCEDURE — 99213 OFFICE O/P EST LOW 20 MIN: CPT | Mod: S$GLB,,, | Performed by: PREVENTIVE MEDICINE

## 2017-11-10 RX ORDER — NAPROXEN 500 MG/1
500 TABLET ORAL 2 TIMES DAILY WITH MEALS
Qty: 30 TABLET | Refills: 0 | Status: SHIPPED | OUTPATIENT
Start: 2017-11-10 | End: 2017-12-01 | Stop reason: SDUPTHER

## 2017-11-10 NOTE — LETTER
[facility] [place]  [address], [address]  [place] [address]  Phone: [phone]  Fax: [phone]    Pt Name: Mehul Randhawa  Injury Date: 8/25/2017   Employee ID:[id number] Date 11/10/2017   Company: [employer]            Appointment Time: 10:30 AM Arrived: 10:30 AM CST   Physician: Misael Hinojosa MD Check out:12:28 PM           Office Treatment: Mehul was seen today for back pain.    Diagnoses and all orders for this visit:    Strain of lumbar region, subsequent encounter  -     naproxen (NAPROSYN) 500 MG tablet; Take 1 tablet (500 mg total) by mouth 2 (two) times daily with meals.    Lumbar strain, subsequent encounter  -     naproxen (NAPROSYN) 500 MG tablet; Take 1 tablet (500 mg total) by mouth 2 (two) times daily with meals.       Patient Instructions: Daily home exercises/warm soaks (Continue exercises demonstrated in physical therapy. )      WORK STATUS: Regular Duty   (May resume regular duty in .)       Return Appointment: 12/1/2017@10:30AM

## 2017-11-10 NOTE — PROGRESS NOTES
Subjective:       Patient ID: Mehul Randhawa is a 47 y.o. male.    Chief Complaint: Back Pain    F/u for LBP (doi 8/25/17) Pt reports intermittent back pain with bending. He also states he has intermittent bilateral hip burning every 2-3 days. Currently reports 1/10 on pain scale.      Back Pain   The current episode started more than 1 month ago. The problem occurs intermittently. The problem has been waxing and waning since onset. The pain is present in the lumbar spine. The quality of the pain is described as aching. The pain radiates to the right thigh and left thigh. The pain is at a severity of 1/10. The pain is mild. The symptoms are aggravated by bending. Pertinent negatives include no abdominal pain, bladder incontinence, bowel incontinence, dysuria or numbness. He has tried NSAIDs and home exercises for the symptoms. The treatment provided moderate relief.     Review of Systems   Constitution: Negative for malaise/fatigue.   Skin: Negative for rash.   Musculoskeletal: Positive for back pain. Negative for muscle cramps, muscle weakness and stiffness.   Gastrointestinal: Negative for abdominal pain and bowel incontinence.   Genitourinary: Negative for bladder incontinence, dysuria, hematuria and urgency.   Neurological: Negative for disturbances in coordination and numbness.   All other systems reviewed and are negative.      Objective:      Physical Exam   Constitutional: He is oriented to person, place, and time. He appears well-developed and well-nourished.   HENT:   Head: Normocephalic.   Eyes: Pupils are equal, round, and reactive to light.   Neck: Normal range of motion.   Cardiovascular: Normal rate.    Pulmonary/Chest: Effort normal.   Musculoskeletal:        Lumbar back: He exhibits decreased range of motion, tenderness and pain. He exhibits no bony tenderness, no swelling, no edema, no deformity, no laceration, no spasm and normal pulse.        Back:    Mild pain with palpation of low back. Mild pain  with flexion, extension and rotation of back, no spasm noted.  No motor or sensory deficits.    Neurological: He is alert and oriented to person, place, and time.   Skin: Skin is warm and dry.   Psychiatric: He has a normal mood and affect.   Nursing note and vitals reviewed.      Assessment:       1. Strain of lumbar region, subsequent encounter    2. Lumbar strain, subsequent encounter        Plan:           Medications Ordered This Encounter      naproxen (NAPROSYN) 500 MG tablet          Sig: Take 1 tablet (500 mg total) by mouth 2 (two) times daily with meals.          Dispense:  30 tablet          Refill:  0  Patient Instructions: Daily home exercises/warm soaks (Continue exercises demonstrated in physical therapy. )   Restrictions: Regular Duty (May resume regular duty in .)  Return in about 3 weeks (around 12/1/2017).

## 2017-12-01 ENCOUNTER — OFFICE VISIT (OUTPATIENT)
Dept: URGENT CARE | Facility: CLINIC | Age: 47
End: 2017-12-01
Payer: COMMERCIAL

## 2017-12-01 DIAGNOSIS — S39.012D LUMBAR STRAIN, SUBSEQUENT ENCOUNTER: ICD-10-CM

## 2017-12-01 DIAGNOSIS — S39.012D STRAIN OF LUMBAR REGION, SUBSEQUENT ENCOUNTER: Primary | ICD-10-CM

## 2017-12-01 PROCEDURE — 99213 OFFICE O/P EST LOW 20 MIN: CPT | Mod: S$GLB,,, | Performed by: PREVENTIVE MEDICINE

## 2017-12-01 RX ORDER — NAPROXEN 500 MG/1
500 TABLET ORAL 2 TIMES DAILY WITH MEALS
Qty: 30 TABLET | Refills: 0 | Status: SHIPPED | OUTPATIENT
Start: 2017-12-01 | End: 2018-01-05 | Stop reason: SDUPTHER

## 2017-12-01 NOTE — PROGRESS NOTES
Subjective:       Patient ID: Mehul Randhawa is a 47 y.o. male.    Chief Complaint: Back Pain    Patient states the pain is improved to his lower back      Back Pain   Episode onset: 08/25/2017. The problem occurs constantly. The problem has been gradually improving since onset. The pain is present in the lumbar spine. The pain does not radiate. The pain is at a severity of 0/10. The pain is mild. The pain is worse during the night. The symptoms are aggravated by bending. Pertinent negatives include no abdominal pain, bladder incontinence, chest pain, dysuria, fever, headaches, numbness or weakness. He has tried heat, home exercises and NSAIDs for the symptoms. The treatment provided mild relief.     Review of Systems   Constitution: Negative for chills, fever and weakness.   HENT: Negative for congestion and sore throat.    Eyes: Negative for blurred vision and pain.   Cardiovascular: Negative for chest pain, palpitations and syncope.   Respiratory: Negative for cough, shortness of breath and wheezing.    Skin: Negative for dry skin, itching and rash.   Musculoskeletal: Positive for back pain and stiffness. Negative for muscle weakness.   Gastrointestinal: Negative for abdominal pain, constipation, diarrhea, nausea and vomiting.   Genitourinary: Negative for bladder incontinence, dysuria and hematuria.   Neurological: Negative for dizziness, headaches and numbness.   All other systems reviewed and are negative.      Objective:      Physical Exam   Constitutional: He is oriented to person, place, and time. He appears well-developed and well-nourished.   HENT:   Head: Normocephalic.   Eyes: Pupils are equal, round, and reactive to light.   Neck: Normal range of motion.   Cardiovascular: Normal rate.    Pulmonary/Chest: Effort normal.   Musculoskeletal:        Lumbar back: He exhibits decreased range of motion, tenderness and pain. He exhibits no bony tenderness, no swelling, no edema, no deformity, no laceration, no  spasm and normal pulse.        Back:    Pain across low back with flexion to 90, extension to 45 and lateral bending to 25 degrees. No focal deficits.   Neurological: He is alert and oriented to person, place, and time.   Skin: Skin is warm and dry.   Psychiatric: He has a normal mood and affect.   Nursing note and vitals reviewed.      Assessment:       1. Strain of lumbar region, subsequent encounter    2. Lumbar strain, subsequent encounter        Plan:           Medications Ordered This Encounter      naproxen (NAPROSYN) 500 MG tablet          Sig: Take 1 tablet (500 mg total) by mouth 2 (two) times daily with meals.          Dispense:  30 tablet          Refill:  0  Patient Instructions: Daily home exercises/warm soaks   Restrictions: Regular Duty  Return in about 5 weeks (around 1/5/2018).

## 2017-12-01 NOTE — LETTER
[facility] [place]  [address], [address]  [place] [address]  Phone: [phone]  Fax: [phone]    Pt Name: Mehul Randhawa  Injury Date: 8/25/17   Employee ID: [id number] Date of First Treatment: 12/01/2017   Company: [employer]            Appointment Time: 10:30 AM Arrived: 10:30 AM CST   Physician: Misael Hinojosa MD Check out: 11:59 AM           Office Treatment: Mehul was seen today for back pain.    Diagnoses and all orders for this visit:    Strain of lumbar region, subsequent encounter  -     naproxen (NAPROSYN) 500 MG tablet; Take 1 tablet (500 mg total) by mouth 2 (two) times daily with meals.    Lumbar strain, subsequent encounter  -     naproxen (NAPROSYN) 500 MG tablet; Take 1 tablet (500 mg total) by mouth 2 (two) times daily with meals.       Patient Instructions: Daily home exercises/warm soaks      Restrictions: NONE   Regular Duty       Return Appointment: 1/5/2018@11:00 AM

## 2018-01-05 ENCOUNTER — OFFICE VISIT (OUTPATIENT)
Dept: URGENT CARE | Facility: CLINIC | Age: 48
End: 2018-01-05
Payer: COMMERCIAL

## 2018-01-05 DIAGNOSIS — G89.29 CHRONIC MIDLINE LOW BACK PAIN WITHOUT SCIATICA: ICD-10-CM

## 2018-01-05 DIAGNOSIS — M54.50 CHRONIC MIDLINE LOW BACK PAIN WITHOUT SCIATICA: ICD-10-CM

## 2018-01-05 DIAGNOSIS — S39.012D STRAIN OF LUMBAR REGION, SUBSEQUENT ENCOUNTER: Primary | ICD-10-CM

## 2018-01-05 PROCEDURE — 99213 OFFICE O/P EST LOW 20 MIN: CPT | Mod: S$GLB,,, | Performed by: PREVENTIVE MEDICINE

## 2018-01-05 RX ORDER — LIDOCAINE 50 MG/G
1 PATCH TOPICAL DAILY
Qty: 30 PATCH | Refills: 2 | Status: SHIPPED | OUTPATIENT
Start: 2018-01-05 | End: 2018-02-02 | Stop reason: SDUPTHER

## 2018-01-05 RX ORDER — NAPROXEN 500 MG/1
500 TABLET ORAL 2 TIMES DAILY WITH MEALS
Qty: 60 TABLET | Refills: 2 | Status: SHIPPED | OUTPATIENT
Start: 2018-01-05 | End: 2018-02-02 | Stop reason: SDUPTHER

## 2018-01-05 NOTE — PROGRESS NOTES
Subjective:       Patient ID: Mehul Randhawa is a 47 y.o. male.    Chief Complaint: Back Pain    F/u for low back pain (doi 8/25/2017) Pt reports intermittent LBP with radiating pain to LLE, and left buttock/ thigh spasms. Pt states that he constantly bends and lifts heavy boxes at work which causes his pain to linger. He reports 1/10 on pain scale. He continues to take naprosyn, use bengay, and warm epsom soaks with moderate relief.       Back Pain   The current episode started more than 1 month ago. The problem occurs intermittently. The problem has been gradually improving since onset. The pain is present in the lumbar spine. The quality of the pain is described as aching. The pain radiates to the left thigh. The pain is at a severity of 1/10. The pain is mild. The pain is worse during the night. The symptoms are aggravated by bending. Pertinent negatives include no abdominal pain, bladder incontinence, bowel incontinence, dysuria or numbness. He has tried NSAIDs and heat for the symptoms. The treatment provided moderate relief.     Review of Systems   Constitution: Negative for malaise/fatigue.   Skin: Negative for color change and rash.   Musculoskeletal: Positive for back pain, muscle cramps, muscle weakness and myalgias. Negative for stiffness.   Gastrointestinal: Negative for abdominal pain and bowel incontinence.   Genitourinary: Negative for bladder incontinence, dysuria, hematuria and urgency.   Neurological: Negative for disturbances in coordination and numbness.   All other systems reviewed and are negative.      Objective:      Physical Exam   Constitutional: He is oriented to person, place, and time. He appears well-developed and well-nourished.   HENT:   Head: Normocephalic.   Eyes: Pupils are equal, round, and reactive to light.   Neck: Normal range of motion.   Cardiovascular: Normal rate.    Pulmonary/Chest: Effort normal.   Musculoskeletal:        Lumbar back: He exhibits decreased range of motion,  tenderness and pain. He exhibits no bony tenderness, no swelling, no edema, no deformity, no laceration, no spasm and normal pulse.        Back:    Neurological: He is alert and oriented to person, place, and time.   Skin: Skin is warm and dry.   Psychiatric: He has a normal mood and affect.   Nursing note and vitals reviewed.      Assessment:       1. Strain of lumbar region, subsequent encounter    2. Chronic midline low back pain without sciatica        Plan:           Medications Ordered This Encounter      lidocaine (LIDODERM) 5 %          Sig: Place 1 patch onto the skin once daily. Remove & Discard patch within 12 hours or as directed by MD          Dispense:  30 patch          Refill:  2      naproxen (NAPROSYN) 500 MG tablet          Sig: Take 1 tablet (500 mg total) by mouth 2 (two) times daily with meals.          Dispense:  60 tablet          Refill:  2  Patient Instructions: Daily home exercises/warm soaks   Restrictions: Regular Duty (Patient will need to be transfered to more suitable work area like Module [employer] given his chronic recurrent back pain)  Return in about 4 weeks (around 2/2/2018).

## 2018-01-05 NOTE — LETTER
[facility]  [address], [address]  [place] [place] [address]  Phone: [phone]  Fax: [phone]    Pt Name: Mehul Randhawa  Injury Date: 08/25/2017   Employee ID: [id number] Date: 01/05/2018   Company: [employer]            Appointment Time: 11:00 AM Arrived: 11:01 AM CST   Physician: Misael Hinojosa MD Time out: 12:38 PM       Office Treatment: Mehul was seen today for back pain.    Diagnoses and all orders for this visit:    Strain of lumbar region, subsequent encounter  -     naproxen (NAPROSYN) 500 MG tablet; Take 1 tablet (500 mg total) by mouth 2 (two) times daily with meals.  Chronic midline low back pain without sciatica  -     lidocaine (LIDODERM) 5 %; Place 1 patch onto the skin once daily. Remove & Discard patch within 12 hours or as directed by MD     Patient Instructions: Daily home exercises/warm soaks    Restrictions: NONE  Regular Duty  Patient will need to be transfered to more   suitable work area like Module [employer] given his chronic recurrent back pain       Return Appointment: 2/2/2018 at 10:00 AM

## 2018-02-02 ENCOUNTER — OFFICE VISIT (OUTPATIENT)
Dept: URGENT CARE | Facility: CLINIC | Age: 48
End: 2018-02-02
Payer: COMMERCIAL

## 2018-02-02 DIAGNOSIS — M54.50 CHRONIC MIDLINE LOW BACK PAIN WITHOUT SCIATICA: ICD-10-CM

## 2018-02-02 DIAGNOSIS — S39.012D LUMBAR STRAIN, SUBSEQUENT ENCOUNTER: ICD-10-CM

## 2018-02-02 DIAGNOSIS — S39.012D STRAIN OF LUMBAR REGION, SUBSEQUENT ENCOUNTER: Primary | ICD-10-CM

## 2018-02-02 DIAGNOSIS — G89.29 CHRONIC MIDLINE LOW BACK PAIN WITHOUT SCIATICA: ICD-10-CM

## 2018-02-02 PROCEDURE — 99213 OFFICE O/P EST LOW 20 MIN: CPT | Mod: S$GLB,,, | Performed by: PREVENTIVE MEDICINE

## 2018-02-02 PROCEDURE — 3008F BODY MASS INDEX DOCD: CPT | Mod: S$GLB,,, | Performed by: PREVENTIVE MEDICINE

## 2018-02-02 RX ORDER — LIDOCAINE 50 MG/G
1 PATCH TOPICAL DAILY
Qty: 30 PATCH | Refills: 0 | Status: SHIPPED | OUTPATIENT
Start: 2018-02-02 | End: 2018-06-25 | Stop reason: SDUPTHER

## 2018-02-02 RX ORDER — NAPROXEN 500 MG/1
500 TABLET ORAL 2 TIMES DAILY WITH MEALS
Qty: 60 TABLET | Refills: 0 | Status: SHIPPED | OUTPATIENT
Start: 2018-02-02 | End: 2018-03-02 | Stop reason: SDUPTHER

## 2018-02-02 NOTE — PROGRESS NOTES
Subjective:       Patient ID: Mehul Randhawa is a 47 y.o. male.    Chief Complaint: Back Pain    F/u for LBP (doi 8/25/2018) Pt reports intermittent spasms left buttock that lasted 2 days after last office visit. About 1 week ago pt states he had radiating pain down his left leg. Currently he c/o left buttock pain, 1/10 on pain scale. Pt takes naprosyn PRN. ajd       Back Pain   The current episode started more than 1 month ago. The problem occurs intermittently. The problem has been waxing and waning since onset. The pain is present in the lumbar spine. The quality of the pain is described as aching and cramping. The pain radiates to the left thigh. The pain is at a severity of 1/10. The pain is mild. Pertinent negatives include no abdominal pain, bladder incontinence, bowel incontinence, dysuria or numbness. He has tried NSAIDs for the symptoms. The treatment provided significant relief.     Review of Systems   Constitution: Negative for malaise/fatigue.   Skin: Negative for color change and rash.   Musculoskeletal: Positive for back pain and muscle cramps. Negative for muscle weakness and stiffness.   Gastrointestinal: Negative for abdominal pain and bowel incontinence.   Genitourinary: Negative for bladder incontinence, dysuria, hematuria and urgency.   Neurological: Negative for disturbances in coordination and numbness.   All other systems reviewed and are negative.      Objective:      Physical Exam   Constitutional: He is oriented to person, place, and time. He appears well-developed and well-nourished.   HENT:   Head: Normocephalic and atraumatic.   Eyes: EOM are normal. Pupils are equal, round, and reactive to light.   Neck: Normal range of motion.   Cardiovascular: Normal rate and regular rhythm.    Pulmonary/Chest: Effort normal and breath sounds normal.   Musculoskeletal:        Lumbar back: He exhibits decreased range of motion, tenderness and pain. He exhibits no bony tenderness, no swelling, no edema, no  deformity, no laceration, no spasm and normal pulse.        Back:    Neurological: He is alert and oriented to person, place, and time.   Skin: Skin is warm and dry.   Psychiatric: He has a normal mood and affect.   Nursing note and vitals reviewed.      Assessment:       1. Strain of lumbar region, subsequent encounter    2. Chronic midline low back pain without sciatica    3. Lumbar strain, subsequent encounter        Plan:           Medications Ordered This Encounter      lidocaine (LIDODERM) 5 %          Sig: Place 1 patch onto the skin once daily. Remove & Discard patch within 12 hours or as directed by MD          Dispense:  30 patch          Refill:  0      naproxen (NAPROSYN) 500 MG tablet          Sig: Take 1 tablet (500 mg total) by mouth 2 (two) times daily with meals.          Dispense:  60 tablet          Refill:  0  Patient Instructions: Daily home exercises/warm soaks (Awaiting transfer to more suitable job position.)   Restrictions: Regular Duty  Follow-up in about 4 weeks (around 3/2/2018).

## 2018-02-02 NOTE — LETTER
[facility] [place]  [address], [address]  [facility] [address]  Phone: [phone]  Fax: [phone]    Pt Name: Mehul Randhawa  Injury Date: 08/25/2017   Employee ID: [id number] Date 02/02/2018   Company: [employer]            Appointment Time: 10:00 AM Arrived: 9:59 AM CST   Physician: Misael Hinojosa MD Time out: 11:05 AM       Office Treatment: Mehul was seen today for back pain.    Diagnoses and all orders for this visit:    Strain of lumbar region, subsequent encounter  -     naproxen (NAPROSYN) 500 MG tablet; Take 1 tablet (500 mg total) by mouth 2 (two) times daily with meals.    Chronic midline low back pain without sciatica  -     lidocaine (LIDODERM) 5 %; Place 1 patch onto the skin once daily. Remove & Discard patch within 12 hours or as directed by MD    Lumbar strain, subsequent encounter       Patient Instructions: Daily home exercises/warm soaks (Awaiting transfer to more suitable job position.)      Restrictions: NONE  Regular Duty       Return Appointment: 3/2/2018 at 9:30 AM

## 2018-02-02 NOTE — LETTER
[facility] [place]  [address], [address]  [facility] [place] [address]  Phone: [phone]  Fax: [phone]    Pt Name: Mehul Randhawa  Injury Date: 08/25/2017   Employee ID: [id number] Date : 02/02/2018   Company: [employer]            Appointment Time: 10:00 AM Arrived: 9:59 AM CST   Physician: Misael Hinojosa MD Time out: 11:05 AM       Office Treatment: Mehul was seen today for back pain.    Diagnoses and all orders for this visit:    Strain of lumbar region, subsequent encounter  -     naproxen (NAPROSYN) 500 MG tablet; Take 1 tablet (500 mg total) by mouth 2 (two) times daily with meals.  Chronic midline low back pain without sciatica  -     lidocaine (LIDODERM) 5 %; Place 1 patch onto the skin once daily. Remove & Discard patch within 12 hours or as directed by MD  Lumbar strain, subsequent encounter     Patient Instructions: Daily home exercises/warm soaks (Awaiting transfer to more suitable job position.)      Restrictions: NONE  Regular Duty       Return Appointment: 3/2/2018 at 9:30 AM

## 2018-03-02 ENCOUNTER — OFFICE VISIT (OUTPATIENT)
Dept: URGENT CARE | Facility: CLINIC | Age: 48
End: 2018-03-02
Payer: COMMERCIAL

## 2018-03-02 DIAGNOSIS — M54.50 CHRONIC MIDLINE LOW BACK PAIN WITHOUT SCIATICA: ICD-10-CM

## 2018-03-02 DIAGNOSIS — S39.012D STRAIN OF LUMBAR REGION, SUBSEQUENT ENCOUNTER: Primary | ICD-10-CM

## 2018-03-02 DIAGNOSIS — G89.29 CHRONIC MIDLINE LOW BACK PAIN WITHOUT SCIATICA: ICD-10-CM

## 2018-03-02 PROCEDURE — 99213 OFFICE O/P EST LOW 20 MIN: CPT | Mod: S$GLB,,, | Performed by: PREVENTIVE MEDICINE

## 2018-03-02 RX ORDER — NAPROXEN 500 MG/1
500 TABLET ORAL 2 TIMES DAILY WITH MEALS
Qty: 60 TABLET | Refills: 0 | Status: SHIPPED | OUTPATIENT
Start: 2018-03-02 | End: 2018-04-13 | Stop reason: SDUPTHER

## 2018-03-02 NOTE — LETTER
[facility] [place]  [address], [address]  [facility] [facility] [address]  Phone: [phone]  Fax: [phone]    Pt Name: Mehul Randhawa  Injury Date: 08/25/2017   Employee ID: [id number] Date : 03/02/2018   Company: [employer]            Appointment Time: 09:30 AM Arrived:  9:25 AM CST   Physician: Misael Hinojosa MD Time out: 10:11 AM       Office Treatment: Mehul was seen today for back pain.    Diagnoses and all orders for this visit:    Strain of lumbar region, subsequent encounter  -     naproxen (NAPROSYN) 500 MG tablet; Take 1 tablet (500 mg total) by mouth 2 (two) times daily with meals.    Chronic midline low back pain without sciatica       Patient Instructions: Daily home exercises/warm soaks (Awaitng transfer to more suitable job position.)      Restrictions: NONE  Regular Duty       Return Appointment: 4/13/2018 at 10:00 AM

## 2018-03-02 NOTE — PROGRESS NOTES
Subjective:       Patient ID: Mehul Randhawa is a 47 y.o. male.    Chief Complaint: Back Pain    F/u for LBP (doi 8/25/2018) Pt reports intermittent low back pain worse with bending. He continues to take his naprosyn as needed. Pt states that his employer is still working on his transfer.       Back Pain   The current episode started more than 1 month ago. The problem occurs intermittently. The problem has been waxing and waning since onset. The pain is present in the lumbar spine. The quality of the pain is described as aching. The pain does not radiate. The pain is mild. The symptoms are aggravated by bending. Pertinent negatives include no abdominal pain, bladder incontinence, bowel incontinence, dysuria or numbness. He has tried NSAIDs for the symptoms. The treatment provided significant relief.     Review of Systems   Constitution: Negative for malaise/fatigue.   Skin: Negative for color change and rash.   Musculoskeletal: Positive for back pain. Negative for muscle cramps, muscle weakness and stiffness.   Gastrointestinal: Negative for abdominal pain and bowel incontinence.   Genitourinary: Negative for bladder incontinence, dysuria, hematuria and urgency.   Neurological: Negative for disturbances in coordination and numbness.   All other systems reviewed and are negative.      Objective:      Physical Exam   Constitutional: He is oriented to person, place, and time. He appears well-developed and well-nourished.   HENT:   Head: Normocephalic and atraumatic.   Eyes: EOM are normal. Pupils are equal, round, and reactive to light.   Neck: Normal range of motion.   Cardiovascular: Normal rate and regular rhythm.    Pulmonary/Chest: Effort normal and breath sounds normal.   Musculoskeletal:        Lumbar back: He exhibits decreased range of motion, tenderness and pain. He exhibits no bony tenderness, no swelling, no edema, no deformity, no laceration, no spasm and normal pulse.        Back:    Neurological: He is alert  and oriented to person, place, and time.   Skin: Skin is warm and dry.   Psychiatric: He has a normal mood and affect.   Nursing note and vitals reviewed.      Assessment:       1. Strain of lumbar region, subsequent encounter    2. Chronic midline low back pain without sciatica        Plan:           Medications Ordered This Encounter      naproxen (NAPROSYN) 500 MG tablet          Sig: Take 1 tablet (500 mg total) by mouth 2 (two) times daily with meals.          Dispense:  60 tablet          Refill:  0  Patient Instructions: Daily home exercises/warm soaks (Awaitng further evaluation of job status.)   Restrictions: Regular Duty  Follow-up in about 6 weeks (around 4/13/2018).

## 2018-04-13 ENCOUNTER — OFFICE VISIT (OUTPATIENT)
Dept: URGENT CARE | Facility: CLINIC | Age: 48
End: 2018-04-13
Payer: COMMERCIAL

## 2018-04-13 DIAGNOSIS — M54.50 CHRONIC MIDLINE LOW BACK PAIN WITHOUT SCIATICA: ICD-10-CM

## 2018-04-13 DIAGNOSIS — S39.012D LUMBAR STRAIN, SUBSEQUENT ENCOUNTER: ICD-10-CM

## 2018-04-13 DIAGNOSIS — S39.012D STRAIN OF LUMBAR REGION, SUBSEQUENT ENCOUNTER: Primary | ICD-10-CM

## 2018-04-13 DIAGNOSIS — G89.29 CHRONIC MIDLINE LOW BACK PAIN WITHOUT SCIATICA: ICD-10-CM

## 2018-04-13 PROCEDURE — 99213 OFFICE O/P EST LOW 20 MIN: CPT | Mod: S$GLB,,, | Performed by: PREVENTIVE MEDICINE

## 2018-04-13 RX ORDER — NAPROXEN 500 MG/1
500 TABLET ORAL 2 TIMES DAILY WITH MEALS
Qty: 60 TABLET | Refills: 0 | Status: SHIPPED | OUTPATIENT
Start: 2018-04-13 | End: 2018-05-25 | Stop reason: SDUPTHER

## 2018-04-13 NOTE — PROGRESS NOTES
"Subjective:       Patient ID: Mehul Randhawa is a 47 y.o. male.    Chief Complaint: Back Pain    F/u for LBP (doi 8/25/2017) pt reports intermittent spasms in bilateral legs. Spasms can last for a couple days at a time. He states spasms are "pain but not painful" Takes naprosyn daily.      Back Pain   The current episode started more than 1 month ago. The problem occurs intermittently. The problem is unchanged. The pain is present in the lumbar spine. The quality of the pain is described as cramping. Pertinent negatives include no abdominal pain, bladder incontinence, bowel incontinence, dysuria or numbness. He has tried NSAIDs for the symptoms. The treatment provided moderate relief.     Review of Systems   Constitution: Negative for malaise/fatigue.   Skin: Negative for color change and rash.   Musculoskeletal: Positive for back pain and muscle cramps. Negative for muscle weakness and stiffness.   Gastrointestinal: Negative for abdominal pain and bowel incontinence.   Genitourinary: Negative for bladder incontinence, dysuria, hematuria and urgency.   Neurological: Negative for disturbances in coordination and numbness.   All other systems reviewed and are negative.      Objective:      Physical Exam   Constitutional: He is oriented to person, place, and time. He appears well-developed and well-nourished.   HENT:   Head: Normocephalic and atraumatic.   Eyes: EOM are normal. Pupils are equal, round, and reactive to light.   Neck: Normal range of motion.   Cardiovascular: Normal rate and regular rhythm.    Pulmonary/Chest: Effort normal and breath sounds normal.   Musculoskeletal:        Lumbar back: He exhibits decreased range of motion, tenderness and pain. He exhibits no bony tenderness, no swelling, no edema, no deformity, no laceration, no spasm and normal pulse.        Back:    Back exam remains unchanged with complaints of pain about low back with flexion to 90, extension to 25 and lateral bending to 25 degrees. " No focal neurologic deficits.   Neurological: He is alert and oriented to person, place, and time.   Skin: Skin is warm and dry.   Psychiatric: He has a normal mood and affect.   Nursing note and vitals reviewed.      Assessment:       1. Strain of lumbar region, subsequent encounter    2. Chronic midline low back pain without sciatica    3. Lumbar strain, subsequent encounter        Plan:           Medications Ordered This Encounter      naproxen (NAPROSYN) 500 MG tablet          Sig: Take 1 tablet (500 mg total) by mouth 2 (two) times daily with meals.          Dispense:  60 tablet          Refill:  0  Patient Instructions: Daily home exercises/warm soaks   Restrictions: Regular Duty  Follow-up in about 6 weeks (around 5/25/2018).

## 2018-04-13 NOTE — LETTER
[facility] [place]  [address]  [place] [address]  Phone: [phone]  Fax: [phone]    Pt Name: Mehul Randhawa  Injury Date: 08/25/2017   Employee ID:[id number] Date 04/13/2018   Company: [employer]            Appointment Time: 10:00 AM Arrived: 10:00 AM CDT   Physician: Misael Hinojosa MD Time out: 11:40 AM       Office Treatment: Mehul was seen today for back pain.    Diagnoses and all orders for this visit:    Strain of lumbar region, subsequent encounter  -     naproxen (NAPROSYN) 500 MG tablet; Take 1 tablet (500 mg total) by mouth 2 (two) times daily with meals.    Chronic midline low back pain without sciatica    Lumbar strain, subsequent encounter       Patient Instructions: Daily home exercises/warm soaks    Restrictions: NONE  Regular Duty       Return Appointment: 5/25/2018 at 1:30 PM

## 2018-05-25 ENCOUNTER — OFFICE VISIT (OUTPATIENT)
Dept: OCCUPATIONAL MEDICINE | Facility: CLINIC | Age: 48
End: 2018-05-25
Payer: COMMERCIAL

## 2018-05-25 DIAGNOSIS — G89.29 CHRONIC MIDLINE LOW BACK PAIN WITHOUT SCIATICA: ICD-10-CM

## 2018-05-25 DIAGNOSIS — M54.50 CHRONIC MIDLINE LOW BACK PAIN WITHOUT SCIATICA: ICD-10-CM

## 2018-05-25 DIAGNOSIS — S39.012D STRAIN OF LUMBAR REGION, SUBSEQUENT ENCOUNTER: Primary | ICD-10-CM

## 2018-05-25 DIAGNOSIS — S39.012D LUMBAR STRAIN, SUBSEQUENT ENCOUNTER: ICD-10-CM

## 2018-05-25 PROCEDURE — 99213 OFFICE O/P EST LOW 20 MIN: CPT | Mod: S$GLB,,, | Performed by: PREVENTIVE MEDICINE

## 2018-05-25 RX ORDER — NAPROXEN 500 MG/1
500 TABLET ORAL 2 TIMES DAILY WITH MEALS
Qty: 60 TABLET | Refills: 0 | Status: SHIPPED | OUTPATIENT
Start: 2018-05-25 | End: 2018-06-25 | Stop reason: SDUPTHER

## 2018-05-25 NOTE — PROGRESS NOTES
Subjective:       Patient ID: Mehul Randhawa is a 47 y.o. male.    Chief Complaint: Back Pain    F/u for low back pain (8/25/2017) pt c/o LBP radiating to right hip. He reports it's an intermittent pinching pain with lifting and bending. Pt takes naprosyn as needed for pain. ajd      Back Pain   The current episode started more than 1 month ago. The problem occurs intermittently. The problem has been waxing and waning since onset. The pain is present in the lumbar spine. The pain is mild. The symptoms are aggravated by bending. Pertinent negatives include no abdominal pain, bladder incontinence, bowel incontinence, dysuria or numbness. He has tried NSAIDs for the symptoms. The treatment provided mild relief.     Review of Systems   Constitution: Negative for malaise/fatigue.   Skin: Negative for color change and rash.   Musculoskeletal: Positive for back pain. Negative for muscle cramps, muscle weakness and stiffness.   Gastrointestinal: Negative for abdominal pain and bowel incontinence.   Genitourinary: Negative for bladder incontinence, dysuria, hematuria and urgency.   Neurological: Negative for disturbances in coordination and numbness.   All other systems reviewed and are negative.      Objective:      Physical Exam   Constitutional: He is oriented to person, place, and time. He appears well-developed and well-nourished.   HENT:   Head: Normocephalic and atraumatic.   Eyes: EOM are normal. Pupils are equal, round, and reactive to light.   Neck: Normal range of motion.   Cardiovascular: Normal rate and regular rhythm.    Pulmonary/Chest: Effort normal and breath sounds normal.   Musculoskeletal:        Lumbar back: He exhibits decreased range of motion, tenderness and pain. He exhibits no bony tenderness, no swelling, no edema, no deformity, no laceration, no spasm and normal pulse.        Back:    Back exam remains unchanged with complaints of pain about low back with flexion to 90, extension to 25 and lateral  bending to 25 degrees. No focal neurologic deficits.   Neurological: He is alert and oriented to person, place, and time.   Skin: Skin is warm and dry.   Psychiatric: He has a normal mood and affect.   Nursing note and vitals reviewed.      Assessment:       1. Strain of lumbar region, subsequent encounter    2. Chronic midline low back pain without sciatica    3. Lumbar strain, subsequent encounter        Plan:           Medications Ordered This Encounter      naproxen (NAPROSYN) 500 MG tablet          Sig: Take 1 tablet (500 mg total) by mouth 2 (two) times daily with meals.          Dispense:  60 tablet          Refill:  0  Patient Instructions: Daily home exercises/warm soaks   Restrictions: Regular Duty  Follow-up in about 4 weeks (around 6/25/2018).

## 2018-05-25 NOTE — LETTER
[facility] [place]  [address]  [place] [address]  Phone: [phone]  Fax: [phone]    Pt Name: Mehul Randhawa  Injury Date: 08/25/2017   Employee ID: [id number] Date : 05/25/2018   Company: [employer]            Appointment Time: 01:30 PM Arrived:  1:50 PM CDT   Physician: Misael Hinojosa MD Time out: 2:39 PM       Office Treatment: Mehul was seen today for back pain.    Diagnoses and all orders for this visit:    Strain of lumbar region, subsequent encounter  -     naproxen (NAPROSYN) 500 MG tablet; Take 1 tablet (500 mg total) by mouth 2 (two) times daily with meals.    Chronic midline low back pain without sciatica    Lumbar strain, subsequent encounter       Patient Instructions: Daily home exercises/warm soaks      Restrictions: NONE  Regular Duty       Return Appointment:  6/25/2018 @ 1:30 PM

## 2018-06-25 ENCOUNTER — OFFICE VISIT (OUTPATIENT)
Dept: URGENT CARE | Facility: CLINIC | Age: 48
End: 2018-06-25
Payer: COMMERCIAL

## 2018-06-25 DIAGNOSIS — M54.50 CHRONIC MIDLINE LOW BACK PAIN WITHOUT SCIATICA: ICD-10-CM

## 2018-06-25 DIAGNOSIS — S39.012D STRAIN OF LUMBAR REGION, SUBSEQUENT ENCOUNTER: Primary | ICD-10-CM

## 2018-06-25 DIAGNOSIS — S39.012D LUMBAR STRAIN, SUBSEQUENT ENCOUNTER: ICD-10-CM

## 2018-06-25 DIAGNOSIS — G89.29 CHRONIC MIDLINE LOW BACK PAIN WITHOUT SCIATICA: ICD-10-CM

## 2018-06-25 PROCEDURE — 99213 OFFICE O/P EST LOW 20 MIN: CPT | Mod: S$GLB,,, | Performed by: PREVENTIVE MEDICINE

## 2018-06-25 RX ORDER — NAPROXEN 500 MG/1
500 TABLET ORAL 2 TIMES DAILY WITH MEALS
Qty: 60 TABLET | Refills: 0 | Status: SHIPPED | OUTPATIENT
Start: 2018-06-25 | End: 2018-07-30 | Stop reason: SDUPTHER

## 2018-06-25 RX ORDER — LIDOCAINE 50 MG/G
1 PATCH TOPICAL DAILY
Qty: 30 PATCH | Refills: 0 | Status: SHIPPED | OUTPATIENT
Start: 2018-06-25 | End: 2019-02-20 | Stop reason: SDUPTHER

## 2018-06-25 NOTE — PROGRESS NOTES
Subjective:       Patient ID: Mehul Randhawa is a 47 y.o. male.    Chief Complaint: Back Pain    F/u for low back pain (8/25/2017) Pt c/o intermittent low back pain, current bilateral hip spasms. Takes naprosyn as needed for pain. ajd      Back Pain   The current episode started more than 1 month ago. The problem occurs intermittently. The problem has been waxing and waning since onset. The pain is present in the lumbar spine. The quality of the pain is described as aching and cramping. The pain radiates to the right thigh. The pain is mild. Pertinent negatives include no abdominal pain, bladder incontinence, bowel incontinence, dysuria or numbness. He has tried NSAIDs for the symptoms. The treatment provided moderate relief.     Review of Systems   Constitution: Negative for malaise/fatigue.   Skin: Negative for rash.   Musculoskeletal: Positive for back pain and myalgias. Negative for muscle cramps, muscle weakness and stiffness.   Gastrointestinal: Negative for abdominal pain and bowel incontinence.   Genitourinary: Negative for bladder incontinence, dysuria, hematuria and urgency.   Neurological: Negative for disturbances in coordination and numbness.   All other systems reviewed and are negative.      Objective:      Physical Exam   Constitutional: He is oriented to person, place, and time. He appears well-developed and well-nourished.   HENT:   Head: Normocephalic and atraumatic.   Eyes: EOM are normal. Pupils are equal, round, and reactive to light.   Neck: Normal range of motion.   Cardiovascular: Normal rate and regular rhythm.    Pulmonary/Chest: Effort normal and breath sounds normal.   Musculoskeletal:        Lumbar back: He exhibits decreased range of motion, tenderness and pain. He exhibits no bony tenderness, no swelling, no edema, no deformity, no laceration, no spasm and normal pulse.        Back:    Back exam remains unchanged with complaints of pain about low back with flexion to 90, extension to 25  and lateral bending to 25 degrees. No focal neurologic deficits.   Neurological: He is alert and oriented to person, place, and time.   Skin: Skin is warm and dry.   Psychiatric: He has a normal mood and affect.   Nursing note and vitals reviewed.      Assessment:       1. Strain of lumbar region, subsequent encounter    2. Chronic midline low back pain without sciatica    3. Lumbar strain, subsequent encounter        Plan:            Patient Instructions: Daily home exercises/warm soaks   Restrictions: Regular Duty  Follow-up in about 5 weeks (around 7/30/2018).

## 2018-06-25 NOTE — LETTER
[facility] [place]  [address], [address]  [facility] [place] [address]  Phone: [phone]  Fax: [phone]    Pt Name: Mehul Randhawa  Injury Date: 08/25/2017   Employee ID:  Date of Treatment: 06/25/2018   Company: [employer]            Appointment Time: 01:15 PM Arrived:  1:30 PM CDT   Appointment Date: [unfilled] Time Out:1435   Physician: Misael Hinojosa MD        Office Treatment: Mehul was seen today for back pain.    Diagnoses and all orders for this visit:    Strain of lumbar region, subsequent encounter  -     naproxen (NAPROSYN) 500 MG tablet; Take 1 tablet (500 mg total) by mouth 2 (two) times daily with meals.    Chronic midline low back pain without sciatica  -     lidocaine (LIDODERM) 5 %; Place 1 patch onto the skin once daily. Remove & Discard patch within 12 hours or as directed by MD    Lumbar strain, subsequent encounter       Patient Instructions: Daily home exercises/warm soaks    Restrictions: Regular Duty       Return Appointment: 7/9/2018 at 0900

## 2018-07-30 ENCOUNTER — OFFICE VISIT (OUTPATIENT)
Dept: URGENT CARE | Facility: CLINIC | Age: 48
End: 2018-07-30
Payer: COMMERCIAL

## 2018-07-30 DIAGNOSIS — S39.012D STRAIN OF LUMBAR REGION, SUBSEQUENT ENCOUNTER: Primary | ICD-10-CM

## 2018-07-30 DIAGNOSIS — M54.50 CHRONIC MIDLINE LOW BACK PAIN WITHOUT SCIATICA: ICD-10-CM

## 2018-07-30 DIAGNOSIS — G89.29 CHRONIC MIDLINE LOW BACK PAIN WITHOUT SCIATICA: ICD-10-CM

## 2018-07-30 PROCEDURE — 99213 OFFICE O/P EST LOW 20 MIN: CPT | Mod: S$GLB,,, | Performed by: NURSE PRACTITIONER

## 2018-07-30 RX ORDER — NAPROXEN 500 MG/1
500 TABLET ORAL 2 TIMES DAILY WITH MEALS
Qty: 60 TABLET | Refills: 0 | Status: SHIPPED | OUTPATIENT
Start: 2018-07-30 | End: 2018-08-22 | Stop reason: SDUPTHER

## 2018-07-30 NOTE — PROGRESS NOTES
Subjective:       Patient ID: Mehul Randhawa is a 47 y.o. male.    Chief Complaint: Back Pain    F/u for back pain (8/25/2018) Pt reports intermittent LBP and spasms. He continues to take naprosyn twice daily, warm soaks, and lidoderm patches. All provide moderate relief. Current pain 0/10. When pt experiences pain he rates it a 5/10 on pain scale. ajd      Back Pain   This is a chronic problem. The current episode started more than 1 month ago. The problem occurs intermittently. The problem has been waxing and waning since onset. The pain is present in the lumbar spine. The quality of the pain is described as aching and cramping. The patient is experiencing no pain. Pertinent negatives include no abdominal pain, bladder incontinence, bowel incontinence, chest pain, dysuria or numbness. He has tried NSAIDs and heat for the symptoms. The treatment provided moderate relief.     Review of Systems   Constitution: Negative for chills and malaise/fatigue.   Cardiovascular: Negative for chest pain and dyspnea on exertion.   Respiratory: Negative for cough and shortness of breath.    Skin: Negative for nail changes and rash.   Musculoskeletal: Positive for back pain, muscle cramps and myalgias. Negative for muscle weakness and stiffness.   Gastrointestinal: Negative for abdominal pain, bowel incontinence and heartburn.   Genitourinary: Negative for bladder incontinence, dysuria, hematuria and urgency.   Neurological: Negative for disturbances in coordination, numbness and sensory change.   Psychiatric/Behavioral: Negative for altered mental status and depression. The patient is not nervous/anxious.    Allergic/Immunologic: Negative for persistent infections.   All other systems reviewed and are negative.      Objective:      Physical Exam   Constitutional: He is oriented to person, place, and time. He appears well-developed and well-nourished.   HENT:   Head: Normocephalic and atraumatic.   Eyes: Conjunctivae and EOM are  normal. Pupils are equal, round, and reactive to light.   Neck: Normal range of motion. Neck supple.   Cardiovascular: Normal rate and regular rhythm.    Pulmonary/Chest: Effort normal and breath sounds normal.   Abdominal: Soft. Bowel sounds are normal.   Musculoskeletal: He exhibits tenderness.        Right hip: Normal.        Left hip: Normal.        Thoracic back: Normal.        Lumbar back: He exhibits decreased range of motion, tenderness and pain.        Back:    Neurological: He is alert and oriented to person, place, and time. He displays normal reflexes. No cranial nerve deficit or sensory deficit. He exhibits normal muscle tone. Coordination and gait normal. GCS eye subscore is 4. GCS verbal subscore is 5. GCS motor subscore is 6.   Reflex Scores:       Patellar reflexes are 2+ on the right side and 2+ on the left side.       Achilles reflexes are 2+ on the right side and 2+ on the left side.  Skin: Skin is warm, dry and intact. Capillary refill takes less than 2 seconds.   Psychiatric: He has a normal mood and affect. His behavior is normal.       Assessment:       1. Strain of lumbar region, subsequent encounter    2. Chronic midline low back pain without sciatica        Plan:       Mehul was seen today for back pain.    Diagnoses and all orders for this visit:    Strain of lumbar region, subsequent encounter  -     naproxen (NAPROSYN) 500 MG tablet; Take 1 tablet (500 mg total) by mouth 2 (two) times daily with meals.    Chronic midline low back pain without sciatica     BIOFREEZE TOPICAL TO LOWER LOWER BACK . THIS MEDICATION IS AVAILABLE AT ANY DRUG STORE USE  AS DIRECTED     Medications Ordered This Encounter      naproxen (NAPROSYN) 500 MG tablet          Sig: Take 1 tablet (500 mg total) by mouth 2 (two) times daily with meals.          Dispense:  60 tablet          Refill:  0  Patient Instructions:  (CONTINUE NAPROSYN AS DIRECTED.  DAILY STRETCHING EXERCISES AS DEMONSTRATED AND DIRECTED)    Restrictions: Regular Duty  Follow-up in about 3 weeks (around 8/22/2018).

## 2018-07-30 NOTE — LETTER
[facility]  [address], [address]  [facility] [address]  Phone: [phone]  Fax: [phone]    Pt Name: Mehul Randhawa  Injury Date: 08/25/2017   Employee ID: [id number] Date: 07/30/2018   Company: [employer]            Appointment Time:  Arrived:  8:55 AM CDT   Physician: Salomon Chinchilla NP Time out: 9:39 AM       Office Treatment: Mehul was seen today for back pain.    Diagnoses and all orders for this visit:    Strain of lumbar region, subsequent encounter  -     naproxen (NAPROSYN) 500 MG tablet; Take 1 tablet (500 mg total) by mouth 2 (two) times daily with meals.    Chronic midline low back pain without sciatica       Patient Instructions:  (CONTINUE NAPROSYN AS DIRECTED.  DAILY STRETCHING EXERCISES AS DEMONSTRATED AND DIRECTED)   BIOFREEZE TOPICAL TO LOWER LOWER BACK . THIS MEDICATION IS AVAILABLE AT ANY DRUG STORE USE  AS DIRECTED       Restrictions: NONE  Regular Duty       Return Appointment: 8/22/2018 at 10:00 AM

## 2018-07-30 NOTE — PATIENT INSTRUCTIONS
Exercises to Strengthen Your Lower Back  Strong lower back and abdominal muscles work together to support your spine. The exercises below will help strengthen the lower back. It is important that you begin exercising slowly and increase levels gradually.  Always begin any exercise program with stretching. If you feel pain while doing any of these exercises, stop and talk to your doctor about a more specific exercise program that better suits your condition.   Low back stretch  The point of stretching is to make you more flexible and increase your range of motion. Stretch only as much as you are able. Stretch slowly. Do not push your stretch to the limit. If at any point you feel pain while stretching, this is your (temporary) limit.  · Lie on your back with your knees bent and both feet on the ground.  · Slowly raise your left knee to your chest as you flatten your lower back against the floor. Hold for 5 seconds.  · Relax and repeat the exercise with your right knee.  · Do 10 of these exercises for each leg.  · Repeat hugging both knees to your chest at the same time.  Building lower back strength  Start your exercise routine with 10 to 30 minutes a day, 1 to 3 times a day.  Initial exercises  Lying on your back:  1. Ankle pumps: Move your foot up and down, towards your head, and then away. Repeat 10 times with each foot.  2. Heel slides: Slowly bend your knee, drawing the heel of your foot towards you. Then slide your heel/foot from you, straightening your knee. Do not lift your foot off the floor (this is not a leg lift).  3. Abdominal contraction: Bend your knees and put your hands on your stomach. Tighten your stomach muscles. Hold for 5 seconds, then relax. Repeat 10 times.  4. Straight leg raise: Bend one leg at the knee and keep the other leg straight. Tighten your stomach muscles. Slowly lift your straight leg 6 to 12 inches off the floor and hold for up to 5 seconds. Repeat 10 times on each  side.  Standin. Wall squats: Stand with your back against the wall. Move your feet about 12 inches away from the wall. Tighten your stomach muscles, and slowly bend your knees until they are at about a 45 degree angle. Do not go down too far. Hold about 5 seconds. Then slowly return to your starting position. Repeat 10 times.  2. Heel raises: Stand facing the wall. Slowly raise the heels of your feet up and down, while keeping your toes on the floor. If you have trouble balancing, you can touch the wall with your hands. Repeat 10 times.  More advanced exercises  When you feel comfortable enough, try these exercises.  1. Kneeling lumbar extension: Begin on your hands and knees. At the same time, raise and straighten your right arm and left leg until they are parallel to the ground. Hold for 2 seconds and come back slowly to a starting position. Repeat with left arm and right leg, alternating 10 times.  2. Prone lumbar extension: Lie face down, arms extended overhead, palms on the floor. At the same time, raise your right arm and left leg as high as comfortably possible. Hold for 10 seconds and slowly return to start. Repeat with left arm and right leg, alternating 10 times. Gradually build up to 20 times. (Advanced: Repeat this exercise raising both arms and both legs a few inches off the floor at the same time. Hold for 5 seconds and release.)  3. Pelvic tilt: Lie on the floor on your back with your knees bent at 90 degrees. Your feet should be flat on the floor. Inhale, exhale, then slowly contract your abdominal muscles bringing your navel toward your spine. Let your pelvis rock back until your lower back is flat on the floor. Hold for 10 seconds while breathing smoothly.  4. Abdominal crunch: Perform a pelvic tilt (above) flattening your lower back against the floor. Holding the tension in your abdominal muscles, take another breath and raise your shoulder blades off the ground (this is not a full sit-up).  Keep your head in line with your body (dont bend your neck forward). Hold for 2 seconds, then slowly lower.  Date Last Reviewed: 6/1/2016  © 4949-3695 [employer]. [address] [place] [address], [place] [address]. All rights reserved. This information is not intended as a substitute for professional medical care. Always follow your healthcare professional's instructions.

## 2018-08-22 ENCOUNTER — OFFICE VISIT (OUTPATIENT)
Dept: URGENT CARE | Facility: CLINIC | Age: 48
End: 2018-08-22
Payer: COMMERCIAL

## 2018-08-22 DIAGNOSIS — S39.012D STRAIN OF LUMBAR REGION, SUBSEQUENT ENCOUNTER: Primary | ICD-10-CM

## 2018-08-22 DIAGNOSIS — G89.29 CHRONIC MIDLINE LOW BACK PAIN WITHOUT SCIATICA: ICD-10-CM

## 2018-08-22 DIAGNOSIS — M54.50 CHRONIC MIDLINE LOW BACK PAIN WITHOUT SCIATICA: ICD-10-CM

## 2018-08-22 DIAGNOSIS — S39.012D LUMBAR STRAIN, SUBSEQUENT ENCOUNTER: ICD-10-CM

## 2018-08-22 PROCEDURE — 99213 OFFICE O/P EST LOW 20 MIN: CPT | Mod: S$GLB,,, | Performed by: PREVENTIVE MEDICINE

## 2018-08-22 RX ORDER — NAPROXEN 500 MG/1
500 TABLET ORAL 2 TIMES DAILY WITH MEALS
Qty: 60 TABLET | Refills: 0 | Status: SHIPPED | OUTPATIENT
Start: 2018-08-22 | End: 2018-09-19 | Stop reason: SDUPTHER

## 2018-08-22 NOTE — PROGRESS NOTES
Subjective:       Patient ID: Mehul Randhawa is a 47 y.o. male.    Chief Complaint: Back Pain    F/u for back pain (8/25/2018) Pt reports intermittent LBP and spasms. He continues to take naprosyn twice daily as needed, warm soaks, and lidoderm patches and biofreeze. Pain 3/10 on pain scale. ajd      Review of Systems   Constitution: Negative for malaise/fatigue.   Skin: Negative for rash.   Musculoskeletal: Positive for back pain, muscle cramps and myalgias. Negative for muscle weakness and stiffness.   Gastrointestinal: Negative for abdominal pain and bowel incontinence.   Genitourinary: Negative for bladder incontinence, dysuria, hematuria and urgency.   Neurological: Negative for disturbances in coordination and numbness.   All other systems reviewed and are negative.      Objective:      Physical Exam   Constitutional: He is oriented to person, place, and time. He appears well-developed and well-nourished.   HENT:   Head: Normocephalic.   Eyes: Pupils are equal, round, and reactive to light.   Neck: Normal range of motion.   Cardiovascular: Normal rate.   Pulmonary/Chest: Effort normal.   Musculoskeletal:        Lumbar back: He exhibits decreased range of motion, tenderness and pain.        Back:    Mild pain persists with limited range of motion of back due to pain. Pain with flexion to 90 degrees, extension to 10 degrees and lateral rotation and bending to 45 degrees.    Neurological: He is alert and oriented to person, place, and time.   Skin: Skin is warm and dry.   Psychiatric: He has a normal mood and affect.   Nursing note and vitals reviewed.      Assessment:       1. Strain of lumbar region, subsequent encounter    2. Chronic midline low back pain without sciatica    3. Lumbar strain, subsequent encounter        Plan:         Medications Ordered This Encounter   Medications    naproxen (NAPROSYN) 500 MG tablet     Sig: Take 1 tablet (500 mg total) by mouth 2 (two) times daily with meals.     Dispense:  60  tablet     Refill:  0     Patient Instructions: Daily home exercises/warm soaks   Restrictions: Regular Duty  Follow-up in about 4 weeks (around 9/19/2018).

## 2018-08-22 NOTE — LETTER
[facility] [place]  [address], [address]  [facility] [address]  Phone: [phone]  Fax: [phone]    Pt Name: Mehul Randhawa  Injury Date: 08/25/2017   Employee ID: [id number] Date: 08/22/2018   Company: [employer]            Appointment Time: 10:00  AM Arrived: 10:00 AM CDT   Physician: Misael Hinojosa MD Time out: 10:52 AM       Office Treatment: Mehul was seen today for back pain.    Diagnoses and all orders for this visit:    Strain of lumbar region, subsequent encounter  -     naproxen (NAPROSYN) 500 MG tablet; Take 1 tablet (500 mg total) by mouth 2 (two) times daily with meals.  Chronic midline low back pain without sciatica  Lumbar strain, subsequent encounter     Patient Instructions: Daily home exercises/warm soaks      Restrictions: NONE  Regular Duty       Return Appointment: 9/19/2018 at 10:00 AM

## 2018-09-19 ENCOUNTER — OFFICE VISIT (OUTPATIENT)
Dept: URGENT CARE | Facility: CLINIC | Age: 48
End: 2018-09-19
Payer: COMMERCIAL

## 2018-09-19 DIAGNOSIS — S39.012D LUMBAR STRAIN, SUBSEQUENT ENCOUNTER: ICD-10-CM

## 2018-09-19 DIAGNOSIS — M54.50 CHRONIC MIDLINE LOW BACK PAIN WITHOUT SCIATICA: ICD-10-CM

## 2018-09-19 DIAGNOSIS — S39.012D STRAIN OF LUMBAR REGION, SUBSEQUENT ENCOUNTER: Primary | ICD-10-CM

## 2018-09-19 DIAGNOSIS — G89.29 CHRONIC MIDLINE LOW BACK PAIN WITHOUT SCIATICA: ICD-10-CM

## 2018-09-19 PROCEDURE — 99213 OFFICE O/P EST LOW 20 MIN: CPT | Mod: S$GLB,,, | Performed by: PREVENTIVE MEDICINE

## 2018-09-19 RX ORDER — NAPROXEN 500 MG/1
500 TABLET ORAL 2 TIMES DAILY WITH MEALS
Qty: 60 TABLET | Refills: 2 | Status: SHIPPED | OUTPATIENT
Start: 2018-09-19 | End: 2019-01-09 | Stop reason: SDUPTHER

## 2018-09-19 RX ORDER — NAPROXEN 500 MG/1
500 TABLET ORAL 2 TIMES DAILY WITH MEALS
Qty: 60 TABLET | Refills: 0 | Status: SHIPPED | OUTPATIENT
Start: 2018-09-19 | End: 2018-09-19 | Stop reason: SDUPTHER

## 2018-09-19 NOTE — PROGRESS NOTES
Subjective:       Patient ID: Mehul Randhawa is a 48 y.o. male.    Chief Complaint: Back Pain    F/u for back pain (8/25/2018) Pt reports intermittent LBP and spasms. He states about a week ago he experienced radiating pain from low back to right groin which lasted 2 days. He takes naprosyn BID when he's having pain and once daily when his pain is controlled. ajd      Review of Systems   Constitution: Negative for malaise/fatigue.   Skin: Negative for rash.   Musculoskeletal: Positive for back pain, muscle cramps and myalgias. Negative for muscle weakness and stiffness.   Gastrointestinal: Negative for abdominal pain and bowel incontinence.   Genitourinary: Negative for bladder incontinence, dysuria, hematuria and urgency.   Neurological: Negative for disturbances in coordination and numbness.   All other systems reviewed and are negative.      Objective:      Physical Exam   Constitutional: He is oriented to person, place, and time. He appears well-developed and well-nourished.   HENT:   Head: Normocephalic and atraumatic.   Eyes: EOM are normal. Pupils are equal, round, and reactive to light.   Neck: Normal range of motion.   Cardiovascular: Normal rate.   Pulmonary/Chest: Effort normal.   Musculoskeletal:        Right hip: He exhibits decreased range of motion and tenderness. He exhibits normal strength, no bony tenderness, no swelling, no crepitus, no deformity and no laceration.        Lumbar back: He exhibits tenderness and pain.        Back:         Legs:  Mild pain persists with limited range of motion of back due to pain. Pain with flexion to 90 degrees, extension to 10 degrees and lateral rotation and bending to 45 degrees.   Pain about right inguinal area with palpation. Pain with no evidence of hernia or swelling about the inguinal area.    Neurological: He is alert and oriented to person, place, and time.   Skin: Skin is warm and dry.   Psychiatric: He has a normal mood and affect.   Nursing note and vitals  reviewed.      Assessment:       1. Strain of lumbar region, subsequent encounter    2. Chronic midline low back pain without sciatica    3. Lumbar strain, subsequent encounter        Plan:         Medications Ordered This Encounter   Medications    naproxen (NAPROSYN) 500 MG tablet     Sig: Take 1 tablet (500 mg total) by mouth 2 (two) times daily with meals.     Dispense:  60 tablet     Refill:  2     Patient Instructions: Daily home exercises/warm soaks   Restrictions: Regular Duty  Follow-up in about 4 weeks (around 10/17/2018).

## 2018-09-19 NOTE — LETTER
[facility] [place]  [address], [address]  [facility] [address]  Phone: [phone]  Fax: [phone]    Pt Name: Mehul Randhawa  Injury Date: 08/25/2017   Employee ID: [id number] Date of Treatment: 09/19/2018   Company: [employer]            Appointment Time: 09:45 AM Arrived: 10:00 AM CDT   Physician: Misael Hinojosa MD        Office Treatment: Mehul was seen today for back pain.    Diagnoses and all orders for this visit:    Strain of lumbar region, subsequent encounter  -     Discontinue: naproxen (NAPROSYN) 500 MG tablet; Take 1 tablet (500 mg total) by mouth 2 (two) times daily with meals.  -     naproxen (NAPROSYN) 500 MG tablet; Take 1 tablet (500 mg total) by mouth 2 (two) times daily with meals.    Chronic midline low back pain without sciatica    Lumbar strain, subsequent encounter       Patient Instructions: Daily home exercises/warm soaks    Restrictions: NONE  Regular Duty       Return Appointment: 10/17/2018 at 10:00AM

## 2018-10-17 ENCOUNTER — OFFICE VISIT (OUTPATIENT)
Dept: URGENT CARE | Facility: CLINIC | Age: 48
End: 2018-10-17
Payer: COMMERCIAL

## 2018-10-17 DIAGNOSIS — M54.50 CHRONIC MIDLINE LOW BACK PAIN WITHOUT SCIATICA: ICD-10-CM

## 2018-10-17 DIAGNOSIS — S39.012D STRAIN OF LUMBAR REGION, SUBSEQUENT ENCOUNTER: Primary | ICD-10-CM

## 2018-10-17 DIAGNOSIS — S39.012D LUMBAR STRAIN, SUBSEQUENT ENCOUNTER: ICD-10-CM

## 2018-10-17 DIAGNOSIS — G89.29 CHRONIC MIDLINE LOW BACK PAIN WITHOUT SCIATICA: ICD-10-CM

## 2018-10-17 PROCEDURE — 99213 OFFICE O/P EST LOW 20 MIN: CPT | Mod: S$GLB,,, | Performed by: PREVENTIVE MEDICINE

## 2018-10-17 NOTE — PROGRESS NOTES
Subjective:       Patient ID: Mehul Randhawa is a 48 y.o. male.    Chief Complaint: Back Pain    F/u for back pain (8/25/2017) Pt reports intermittent LBP and spasms. He also c/o intermittent radiating pain to LRE. He takes naprosyn once or twice daily as needed. Current pain 1/10 on pain scale. Pt also has been communicating with his supervisors about a position reassignment. ajd      Back Pain   This is a chronic problem. The current episode started more than 1 year ago. The problem occurs intermittently. The problem has been waxing and waning since onset. The pain is present in the lumbar spine. The quality of the pain is described as cramping and shooting. The pain radiates to the right thigh. The pain is at a severity of 1/10. The symptoms are aggravated by bending, position and standing. Pertinent negatives include no abdominal pain, bladder incontinence, bowel incontinence, dysuria or numbness. He has tried NSAIDs for the symptoms. The treatment provided mild relief.     Review of Systems   Constitution: Negative for malaise/fatigue.   Skin: Negative for rash.   Musculoskeletal: Positive for back pain, muscle cramps and myalgias. Negative for muscle weakness and stiffness.   Gastrointestinal: Negative for abdominal pain and bowel incontinence.   Genitourinary: Negative for bladder incontinence, dysuria, hematuria and urgency.   Neurological: Negative for disturbances in coordination and numbness.   All other systems reviewed and are negative.      Objective:      Physical Exam   Constitutional: He is oriented to person, place, and time. He appears well-developed and well-nourished.   HENT:   Head: Normocephalic.   Eyes: Pupils are equal, round, and reactive to light.   Neck: Normal range of motion.   Cardiovascular: Normal rate.   Pulmonary/Chest: Effort normal.   Musculoskeletal:        Lumbar back: He exhibits decreased range of motion, tenderness and pain.        Back:    Mild pain persists with limited range  of motion of back due to pain. Pain with flexion to 90 degrees, extension to 10 degrees and lateral rotation and bending to 45 degrees.    Neurological: He is alert and oriented to person, place, and time.   Skin: Skin is warm and dry.   Psychiatric: He has a normal mood and affect.   Nursing note and vitals reviewed.      Assessment:       1. Strain of lumbar region, subsequent encounter    2. Chronic midline low back pain without sciatica    3. Lumbar strain, subsequent encounter        Plan:            Patient Instructions: Daily home exercises/warm soaks(Continue taking Naprosyn 500mg previously prescribed.)   Restrictions: Regular Duty  Follow-up in about 6 weeks (around 11/28/2018).

## 2018-10-17 NOTE — LETTER
[facility] [place]  [address] [place] [address], [address]  [facility] [address]  Phone: [phone]  Fax: [phone]    Pt Name: Mehul Randhawa  Injury Date: 08/25/2017   Employee ID: [id number] Date : 10/17/2018   Company: [employer]      Appointment Time: 10:00  AM Arrived: 9:25 AM   Provider: Misael Hinojosa MD Time Out 10:40 AM     Office Treatment:   1. Strain of lumbar region, subsequent encounter    2. Chronic midline low back pain without sciatica    3. Lumbar strain, subsequent encounter          Patient Instructions: Daily home exercises/warm soaks(Continue taking Naprosyn 500mg previously prescribed.)      Restrictions: NONE  Regular Duty     Return Appointment: 11/28/2018 at 10:00 AM       francy

## 2018-11-28 ENCOUNTER — OFFICE VISIT (OUTPATIENT)
Dept: URGENT CARE | Facility: CLINIC | Age: 48
End: 2018-11-28
Payer: COMMERCIAL

## 2018-11-28 DIAGNOSIS — G89.29 CHRONIC MIDLINE LOW BACK PAIN WITHOUT SCIATICA: ICD-10-CM

## 2018-11-28 DIAGNOSIS — M54.50 CHRONIC MIDLINE LOW BACK PAIN WITHOUT SCIATICA: ICD-10-CM

## 2018-11-28 DIAGNOSIS — S39.012D STRAIN OF LUMBAR REGION, SUBSEQUENT ENCOUNTER: Primary | ICD-10-CM

## 2018-11-28 DIAGNOSIS — S39.012D LUMBAR STRAIN, SUBSEQUENT ENCOUNTER: ICD-10-CM

## 2018-11-28 PROCEDURE — 99214 OFFICE O/P EST MOD 30 MIN: CPT | Mod: S$GLB,,, | Performed by: PREVENTIVE MEDICINE

## 2018-11-28 NOTE — LETTER
[facility]  [address], [address]  [facility] [address]  Phone: [phone]  Fax: [phone]  [facility]: [email or web address]    Pt Name: Mehul Randhawa  Injury Date: 08/25/2017   Employee ID: [id number] Date of Treatment: 11/28/2018   Company: [employer]      Appointment Time: 10:00 AM Arrived: 9:57AM   Provider: Misael Hinojosa MD Time Out:12:00PM     Office Treatment:   1. Strain of lumbar region, subsequent encounter    2. Chronic midline low back pain without sciatica    3. Lumbar strain, subsequent encounter          Patient Instructions: Daily home exercises/warm soaks(Long discussion in the office regarding his chronic back pain and work status. Continue Naprosyn 500mg as needed for back pain.)    Restrictions: Regular Duty     Return Appointment: 1/9/2019 at 11:00AM

## 2018-11-28 NOTE — PROGRESS NOTES
Subjective:       Patient ID: Mehul Randhawa is a 48 y.o. male.    Chief Complaint: Back Pain     Follow-up for Back Pain ( DOI 08/25/17 ) Pain score 1/10 with sharp pains with intermittent spasms in left butt cheek. Two weeks ago had pain radiating down left leg for 2 days then this last Friday pain radiating down left leg for a few hours. Complaining of Back fatigue due to constant bending/lifting at work. Med being taken is Naprosyn moderate help. DAYSI      Back Pain       Review of Systems   Musculoskeletal: Positive for back pain, joint pain, muscle weakness and stiffness.   All other systems reviewed and are negative.      Objective:      Physical Exam   Constitutional: He is oriented to person, place, and time. He appears well-developed and well-nourished.   HENT:   Head: Normocephalic.   Eyes: Pupils are equal, round, and reactive to light.   Neck: Normal range of motion.   Cardiovascular: Normal rate.   Pulmonary/Chest: Effort normal.   Musculoskeletal:        Lumbar back: He exhibits decreased range of motion and tenderness.        Back:    Less pain today with almost full range of motion of back.  Pain with flexion to 90 degrees, extension to 10 degrees and lateral rotation and bending to 45 degrees. No focal neurologic deficits.   Neurological: He is alert and oriented to person, place, and time.   Skin: Skin is warm and dry.   Psychiatric: He has a normal mood and affect.   Nursing note and vitals reviewed.      Assessment:       1. Strain of lumbar region, subsequent encounter    2. Chronic midline low back pain without sciatica    3. Lumbar strain, subsequent encounter        Plan:            Patient Instructions: Daily home exercises/warm soaks(Long discussion in the office regarding his chronic back pain and work status. Continue Naprosyn 500mg as needed for back pain.)   Restrictions: Regular Duty  Follow-up in about 6 weeks (around 1/9/2019).

## 2019-01-09 ENCOUNTER — OFFICE VISIT (OUTPATIENT)
Dept: URGENT CARE | Facility: CLINIC | Age: 49
End: 2019-01-09
Payer: COMMERCIAL

## 2019-01-09 DIAGNOSIS — S39.012D LUMBAR STRAIN, SUBSEQUENT ENCOUNTER: ICD-10-CM

## 2019-01-09 DIAGNOSIS — M54.50 CHRONIC MIDLINE LOW BACK PAIN WITHOUT SCIATICA: ICD-10-CM

## 2019-01-09 DIAGNOSIS — S39.012D STRAIN OF LUMBAR REGION, SUBSEQUENT ENCOUNTER: Primary | ICD-10-CM

## 2019-01-09 DIAGNOSIS — G89.29 CHRONIC MIDLINE LOW BACK PAIN WITHOUT SCIATICA: ICD-10-CM

## 2019-01-09 PROCEDURE — 99214 PR OFFICE/OUTPT VISIT, EST, LEVL IV, 30-39 MIN: ICD-10-PCS | Mod: S$GLB,,, | Performed by: PREVENTIVE MEDICINE

## 2019-01-09 PROCEDURE — 99214 OFFICE O/P EST MOD 30 MIN: CPT | Mod: S$GLB,,, | Performed by: PREVENTIVE MEDICINE

## 2019-01-09 RX ORDER — NAPROXEN 500 MG/1
500 TABLET ORAL 2 TIMES DAILY WITH MEALS
Qty: 60 TABLET | Refills: 2 | Status: SHIPPED | OUTPATIENT
Start: 2019-01-09 | End: 2019-02-20 | Stop reason: SDUPTHER

## 2019-01-09 NOTE — LETTER
[facility] [place]  [address], [address]  [facility] [address]  Phone: [phone]  Fax: [phone]  [facility]: [email or web address]    Pt Name: Mehul Randhawa  Injury Date: 08/25/2017   Employee ID: [id number] Date: 01/09/2019   Company: [employer]      Appointment Time: 11:00 AM Arrived: 10:53 AM   Provider: Misael Hinojosa MD Time Out: 12:10 PM     Office Treatment:   1. Strain of lumbar region, subsequent encounter    2. Chronic midline low back pain without sciatica    3. Lumbar strain, subsequent encounter      Medications Ordered This Encounter   Medications    naproxen (NAPROSYN) 500 MG tablet      Patient Instructions: Daily home exercises/warm soaks      Restrictions: NONE  Regular Duty     Return Appointment: 2/20/2019 at 10:30 AM       francy

## 2019-01-09 NOTE — PROGRESS NOTES
Subjective:       Patient ID: Mehul Randhawa is a 48 y.o. male.    Chief Complaint: Back Injury    F/u for back pain (8/25/2017) Pt c/o intermittent low back pain with intermittent radiating pain to LLE, and bilateral groin. Pt states that he has been taking naprosyn BID for the past two weeks. Current pain 1/10 on pain scale. ajd       Review of Systems   Constitution: Negative for chills, fever and weakness.   HENT: Negative for congestion and sore throat.    Eyes: Negative for blurred vision and pain.   Cardiovascular: Negative for chest pain, palpitations and syncope.   Respiratory: Negative for cough, shortness of breath and wheezing.    Skin: Negative for dry skin, itching and rash.   Musculoskeletal: Positive for back pain. Negative for muscle weakness and stiffness.   Gastrointestinal: Negative for abdominal pain, constipation, diarrhea, nausea and vomiting.   Genitourinary: Negative for bladder incontinence, dysuria and hematuria.   Neurological: Negative for dizziness, headaches and numbness.   All other systems reviewed and are negative.      Objective:      Physical Exam   Constitutional: He is oriented to person, place, and time. He appears well-developed and well-nourished.   HENT:   Head: Normocephalic.   Eyes: Pupils are equal, round, and reactive to light.   Neck: Normal range of motion.   Cardiovascular: Normal rate.   Pulmonary/Chest: Effort normal.   Musculoskeletal:        Lumbar back: He exhibits decreased range of motion and tenderness.        Back:    pain persists with almost full range of motion of back.  Low back pain with flexion to 90 degrees, extension to 25 degrees and lateral rotation and bending to 45 degrees. No focal neurologic deficits about lower extremities.   Neurological: He is alert and oriented to person, place, and time.   Skin: Skin is warm and dry.   Psychiatric: He has a normal mood and affect.   Nursing note and vitals reviewed.      Assessment:       1. Strain of lumbar  region, subsequent encounter    2. Chronic midline low back pain without sciatica    3. Lumbar strain, subsequent encounter        Plan:         Medications Ordered This Encounter   Medications    naproxen (NAPROSYN) 500 MG tablet     Sig: Take 1 tablet (500 mg total) by mouth 2 (two) times daily with meals.     Dispense:  60 tablet     Refill:  2     Patient Instructions: Daily home exercises/warm soaks   Restrictions: Regular Duty  Follow-up in about 6 weeks (around 2/20/2019).

## 2019-02-20 ENCOUNTER — OFFICE VISIT (OUTPATIENT)
Dept: URGENT CARE | Facility: CLINIC | Age: 49
End: 2019-02-20
Payer: COMMERCIAL

## 2019-02-20 DIAGNOSIS — G89.29 CHRONIC MIDLINE LOW BACK PAIN WITHOUT SCIATICA: ICD-10-CM

## 2019-02-20 DIAGNOSIS — S39.012D STRAIN OF LUMBAR REGION, SUBSEQUENT ENCOUNTER: Primary | ICD-10-CM

## 2019-02-20 DIAGNOSIS — M54.50 CHRONIC MIDLINE LOW BACK PAIN WITHOUT SCIATICA: ICD-10-CM

## 2019-02-20 DIAGNOSIS — S39.012D LUMBAR STRAIN, SUBSEQUENT ENCOUNTER: ICD-10-CM

## 2019-02-20 PROCEDURE — 99214 PR OFFICE/OUTPT VISIT, EST, LEVL IV, 30-39 MIN: ICD-10-PCS | Mod: S$GLB,,, | Performed by: PREVENTIVE MEDICINE

## 2019-02-20 PROCEDURE — 99214 OFFICE O/P EST MOD 30 MIN: CPT | Mod: S$GLB,,, | Performed by: PREVENTIVE MEDICINE

## 2019-02-20 RX ORDER — NAPROXEN 500 MG/1
500 TABLET ORAL 2 TIMES DAILY WITH MEALS
Qty: 60 TABLET | Refills: 2 | Status: SHIPPED | OUTPATIENT
Start: 2019-02-20 | End: 2019-04-03 | Stop reason: SDUPTHER

## 2019-02-20 RX ORDER — LIDOCAINE 50 MG/G
1 PATCH TOPICAL DAILY
Qty: 30 PATCH | Refills: 0 | Status: SHIPPED | OUTPATIENT
Start: 2019-02-20 | End: 2019-04-03 | Stop reason: SDUPTHER

## 2019-02-20 NOTE — LETTER
[facility]  [address], [address]  [place] [facility] [address]  Phone: [phone]  Fax: [phone]  [facility]: [id number]    Pt Name: Mehul Randhawa  Injury Date: 08/25/2017   Employee ID: [id number] Date: 02/20/2019   Company: [employer]      Appointment Time: 10:30 AM Arrived: 10:24 AM   Provider: Misael Hinojosa MD Time Out: 11:30 AM     Office Treatment:   1. Strain of lumbar region, subsequent encounter    2. Chronic midline low back pain without sciatica    3. Lumbar strain, subsequent encounter      Medications Ordered This Encounter   Medications    lidocaine (LIDODERM) 5 %    naproxen (NAPROSYN) 500 MG tablet      Patient Instructions: Daily home exercises/warm soaks    Restrictions: NONE  Regular Duty  (Awaiting transfer to less physically demanding job.)     Return Appointment: 4/3/2019 at 9:30 AM

## 2019-02-20 NOTE — PROGRESS NOTES
Subjective:       Patient ID: Mehul Randhawa is a 48 y.o. male.    Chief Complaint: Back Pain     F/u for back pain (8/25/2017) Pt continues to c/o intermittent low back pain with intermittent radiating pain to LLE. Pt takes naprosyn, applies lidocaine patches, warm soaks with mild to moderate relief. Denies current pain.       Review of Systems   Constitution: Negative for malaise/fatigue.   Skin: Negative for rash.   Musculoskeletal: Positive for back pain and myalgias. Negative for muscle cramps, muscle weakness and stiffness.   Gastrointestinal: Negative for abdominal pain and bowel incontinence.   Genitourinary: Negative for bladder incontinence, dysuria, hematuria and urgency.   Neurological: Negative for disturbances in coordination and numbness.   All other systems reviewed and are negative.      Objective:      Physical Exam   Constitutional: He is oriented to person, place, and time. He appears well-developed and well-nourished.   HENT:   Head: Normocephalic.   Eyes: Pupils are equal, round, and reactive to light.   Neck: Normal range of motion.   Cardiovascular: Normal rate.   Pulmonary/Chest: Effort normal.   Musculoskeletal:        Lumbar back: He exhibits decreased range of motion and tenderness.        Back:    Less pain today with almost full range of motion of back.  Pain with flexion to 90 degrees, extension to 10 degrees and lateral rotation and bending to 45 degrees. No focal neurologic deficits.   Neurological: He is alert and oriented to person, place, and time.   Skin: Skin is warm and dry.   Psychiatric: He has a normal mood and affect.   Nursing note and vitals reviewed.      Assessment:       1. Strain of lumbar region, subsequent encounter    2. Chronic midline low back pain without sciatica    3. Lumbar strain, subsequent encounter        Plan:         Medications Ordered This Encounter   Medications    lidocaine (LIDODERM) 5 %     Sig: Place 1 patch onto the skin once daily. Remove &  Discard patch within 12 hours or as directed by MD     Dispense:  30 patch     Refill:  0    naproxen (NAPROSYN) 500 MG tablet     Sig: Take 1 tablet (500 mg total) by mouth 2 (two) times daily with meals.     Dispense:  60 tablet     Refill:  2     Patient Instructions: Daily home exercises/warm soaks   Restrictions: Regular Duty(Awaiting transfer to less physically demanding job.)  Follow-up in about 6 weeks (around 4/3/2019).

## 2019-04-03 ENCOUNTER — OFFICE VISIT (OUTPATIENT)
Dept: URGENT CARE | Facility: CLINIC | Age: 49
End: 2019-04-03
Payer: COMMERCIAL

## 2019-04-03 DIAGNOSIS — S39.012D LUMBAR STRAIN, SUBSEQUENT ENCOUNTER: ICD-10-CM

## 2019-04-03 DIAGNOSIS — G89.29 CHRONIC MIDLINE LOW BACK PAIN WITHOUT SCIATICA: ICD-10-CM

## 2019-04-03 DIAGNOSIS — M54.50 CHRONIC MIDLINE LOW BACK PAIN WITHOUT SCIATICA: ICD-10-CM

## 2019-04-03 DIAGNOSIS — S39.012D STRAIN OF LUMBAR REGION, SUBSEQUENT ENCOUNTER: Primary | ICD-10-CM

## 2019-04-03 PROCEDURE — 99214 OFFICE O/P EST MOD 30 MIN: CPT | Mod: S$GLB,,, | Performed by: PREVENTIVE MEDICINE

## 2019-04-03 PROCEDURE — 99214 PR OFFICE/OUTPT VISIT, EST, LEVL IV, 30-39 MIN: ICD-10-PCS | Mod: S$GLB,,, | Performed by: PREVENTIVE MEDICINE

## 2019-04-03 RX ORDER — NAPROXEN 500 MG/1
500 TABLET ORAL 2 TIMES DAILY WITH MEALS
Qty: 60 TABLET | Refills: 2 | Status: SHIPPED | OUTPATIENT
Start: 2019-04-03 | End: 2019-05-15 | Stop reason: ALTCHOICE

## 2019-04-03 RX ORDER — LIDOCAINE 50 MG/G
1 PATCH TOPICAL DAILY
Qty: 30 PATCH | Refills: 0 | Status: SHIPPED | OUTPATIENT
Start: 2019-04-03 | End: 2019-06-26 | Stop reason: SDUPTHER

## 2019-04-03 NOTE — LETTER
[facility]  [address], [address]  [facility] [place] [address]  Phone: [phone]  Fax: [phone]  [facility]: [id number]    Pt Name: Mehul Randhawa  Injury Date: 08/25/2017   Employee ID: [id number] Date ofTreatment: 04/03/2019   Company: [employer]      Appointment Time: 9:30am Arrived: 9:27   Provider: Misael Hinojosa MD Time Out:10:37     Office Treatment:   1. Strain of lumbar region, subsequent encounter    2. Lumbar strain, subsequent encounter    3. Chronic midline low back pain without sciatica      Medications Ordered This Encounter   Medications    lidocaine (LIDODERM) 5 %    naproxen (NAPROSYN) 500 MG tablet      Patient Instructions: Daily home exercises/warm soaks    Restrictions: Regular Duty(Still awaiting transfer to less physical work.)     Return Appointment: 5/15/2019 at 9:30am

## 2019-04-03 NOTE — PROGRESS NOTES
Subjective:       Patient ID: Mehul Randhawa is a 48 y.o. male.    Chief Complaint: Back Pain    Pt is being seen today for a back injury from 8/25/17.  Pt continues to have intermittent low back pain with spasms radiating to both hips.  He continues to take his Rx as needed with moderate relief.  He is pain level today is 2/10.  KD    Review of Systems   Constitution: Negative for malaise/fatigue.   Skin: Negative for rash.   Musculoskeletal: Positive for back pain and joint pain. Negative for muscle cramps, muscle weakness and stiffness.   Gastrointestinal: Negative for abdominal pain and bowel incontinence.   Genitourinary: Negative for bladder incontinence, dysuria, hematuria and urgency.   Neurological: Negative for disturbances in coordination and numbness.   All other systems reviewed and are negative.      Objective:      Physical Exam   Constitutional: He is oriented to person, place, and time. He appears well-developed and well-nourished.   HENT:   Head: Normocephalic.   Eyes: Pupils are equal, round, and reactive to light.   Neck: Normal range of motion.   Cardiovascular: Normal rate.   Pulmonary/Chest: Effort normal.   Musculoskeletal:        Lumbar back: He exhibits decreased range of motion and tenderness.        Back:    Less pain today with almost full range of motion of back.  Pain with flexion to 90 degrees, extension to 10 degrees and lateral rotation and bending to 45 degrees. No focal neurologic deficits.   Neurological: He is alert and oriented to person, place, and time.   Skin: Skin is warm and dry.   Psychiatric: He has a normal mood and affect.   Nursing note and vitals reviewed.      Assessment:       1. Strain of lumbar region, subsequent encounter    2. Lumbar strain, subsequent encounter    3. Chronic midline low back pain without sciatica        Plan:         Medications Ordered This Encounter   Medications    lidocaine (LIDODERM) 5 %     Sig: Place 1 patch onto the skin once daily.  Remove & Discard patch within 12 hours or as directed by MD     Dispense:  30 patch     Refill:  0    naproxen (NAPROSYN) 500 MG tablet     Sig: Take 1 tablet (500 mg total) by mouth 2 (two) times daily with meals.     Dispense:  60 tablet     Refill:  2     Patient Instructions: Daily home exercises/warm soaks   Restrictions: Regular Duty(Still awaiting transfer to less physical work.)  Follow up in about 6 weeks (around 5/15/2019).

## 2019-05-15 ENCOUNTER — OFFICE VISIT (OUTPATIENT)
Dept: URGENT CARE | Facility: CLINIC | Age: 49
End: 2019-05-15
Payer: COMMERCIAL

## 2019-05-15 DIAGNOSIS — S39.012D LUMBAR STRAIN, SUBSEQUENT ENCOUNTER: ICD-10-CM

## 2019-05-15 DIAGNOSIS — G89.29 CHRONIC MIDLINE LOW BACK PAIN WITHOUT SCIATICA: ICD-10-CM

## 2019-05-15 DIAGNOSIS — M54.50 CHRONIC MIDLINE LOW BACK PAIN WITHOUT SCIATICA: ICD-10-CM

## 2019-05-15 DIAGNOSIS — S39.012D STRAIN OF LUMBAR REGION, SUBSEQUENT ENCOUNTER: Primary | ICD-10-CM

## 2019-05-15 PROCEDURE — 99214 PR OFFICE/OUTPT VISIT, EST, LEVL IV, 30-39 MIN: ICD-10-PCS | Mod: S$GLB,,, | Performed by: PREVENTIVE MEDICINE

## 2019-05-15 PROCEDURE — 99214 OFFICE O/P EST MOD 30 MIN: CPT | Mod: S$GLB,,, | Performed by: PREVENTIVE MEDICINE

## 2019-05-15 RX ORDER — NAPROXEN 500 MG/1
500 TABLET ORAL 2 TIMES DAILY WITH MEALS
Qty: 60 TABLET | Refills: 1 | Status: SHIPPED | OUTPATIENT
Start: 2019-05-15 | End: 2019-08-07 | Stop reason: SDUPTHER

## 2019-05-15 NOTE — LETTER
[facility]  [address]  [place] [place] [address]  Phone: [phone]  Fax: [phone]  [facility]: [id number]    Pt Name: Mehul Randhawa  Injury Date: 08/25/2017   Employee ID: [id number] Date of Treatment: 05/15/2019   Company: [employer]      Appointment Time: 9:30 AM Arrived: 9:27 AM   Provider: Misael Hinojosa MD Time Out: 10:51 AM     Office Treatment:   1. Strain of lumbar region, subsequent encounter    2. Lumbar strain, subsequent encounter    3. Chronic midline low back pain without sciatica      Medications Ordered This Encounter   Medications    naproxen (NAPROSYN) 500 MG tablet      Patient Instructions: Daily home exercises/warm soaks    Restrictions: Regular Duty(Still awaiting transfer to less physical work )     Return Appointment: 6/26/2019 at 9:30 AM       MARCELL

## 2019-05-15 NOTE — PROGRESS NOTES
Subjective:       Patient ID: Mehul Randhawa is a 48 y.o. male.    Chief Complaint: Back Pain    Pt is being seen today for a back injury from 8/25/17.  Patient is working full duty. Patient continues to have intermittent low back pain with spasms radiating to both hips especially when bending and walking.  He continues to take Naprosyn as needed which is sometimes twice a day with some relief but not right away. Currently not using the Lidoderm patch.  He is pain level today is 1/10. -MR    Back Pain   The current episode started more than 1 year ago. The problem occurs intermittently. The problem has been waxing and waning since onset. The quality of the pain is described as aching and stabbing. The pain does not radiate. The pain is at a severity of 1/10. The pain is mild. The pain is worse during the day. The symptoms are aggravated by bending. Pertinent negatives include no numbness or tingling. He has tried heat for the symptoms. The treatment provided mild relief.     Review of Systems   Musculoskeletal: Positive for back pain.   Neurological: Negative for numbness and tingling.       Objective:      Physical Exam   Constitutional: He is oriented to person, place, and time. He appears well-developed and well-nourished.   HENT:   Head: Normocephalic.   Eyes: Pupils are equal, round, and reactive to light.   Neck: Normal range of motion.   Cardiovascular: Normal rate.   Pulmonary/Chest: Effort normal.   Musculoskeletal:        Lumbar back: He exhibits decreased range of motion and tenderness.        Back:    Pain persists with almost full range of motion of back.  Pain with flexion to 90 degrees, extension to 10 degrees and lateral rotation and bending to 45 degrees. No focal neurologic deficits.   Neurological: He is alert and oriented to person, place, and time.   Skin: Skin is warm and dry.   Psychiatric: He has a normal mood and affect.   Nursing note and vitals reviewed.      Assessment:       1. Strain of  lumbar region, subsequent encounter    2. Lumbar strain, subsequent encounter    3. Chronic midline low back pain without sciatica        Plan:            Patient Instructions: Daily home exercises/warm soaks   Restrictions: Regular Duty(Still awaiting transfer to less physical work )  Follow up in about 6 weeks (around 6/26/2019).

## 2019-06-26 ENCOUNTER — OFFICE VISIT (OUTPATIENT)
Dept: URGENT CARE | Facility: CLINIC | Age: 49
End: 2019-06-26
Payer: COMMERCIAL

## 2019-06-26 DIAGNOSIS — S39.012D STRAIN OF LUMBAR REGION, SUBSEQUENT ENCOUNTER: Primary | ICD-10-CM

## 2019-06-26 DIAGNOSIS — G89.29 CHRONIC MIDLINE LOW BACK PAIN WITHOUT SCIATICA: ICD-10-CM

## 2019-06-26 DIAGNOSIS — M54.50 CHRONIC MIDLINE LOW BACK PAIN WITHOUT SCIATICA: ICD-10-CM

## 2019-06-26 DIAGNOSIS — S39.012D LUMBAR STRAIN, SUBSEQUENT ENCOUNTER: ICD-10-CM

## 2019-06-26 PROCEDURE — 99214 OFFICE O/P EST MOD 30 MIN: CPT | Mod: S$GLB,,, | Performed by: PREVENTIVE MEDICINE

## 2019-06-26 PROCEDURE — 99214 PR OFFICE/OUTPT VISIT, EST, LEVL IV, 30-39 MIN: ICD-10-PCS | Mod: S$GLB,,, | Performed by: PREVENTIVE MEDICINE

## 2019-06-26 RX ORDER — LIDOCAINE 50 MG/G
1 PATCH TOPICAL DAILY
Qty: 30 PATCH | Refills: 0 | Status: SHIPPED | OUTPATIENT
Start: 2019-06-26 | End: 2019-08-07 | Stop reason: SDUPTHER

## 2019-06-26 NOTE — PROGRESS NOTES
Subjective:       Patient ID: Mehul Randhawa is a 48 y.o. male.    Chief Complaint: Back Pain    Pt is being seen today for a follow up from a back injury from 8/25/17.  He continues to have intermittent low back pain with spasms with radiating pain down his left leg.  His pain level today is 2/10.  KD    Back Pain   Pertinent negatives include no abdominal pain, bladder incontinence, bowel incontinence, dysuria or numbness.     Review of Systems   Constitution: Negative for malaise/fatigue.   Skin: Negative for rash.   Musculoskeletal: Positive for back pain and joint pain. Negative for muscle cramps, muscle weakness and stiffness.   Gastrointestinal: Negative for abdominal pain and bowel incontinence.   Genitourinary: Negative for bladder incontinence, dysuria, hematuria and urgency.   Neurological: Negative for disturbances in coordination and numbness.   All other systems reviewed and are negative.      Objective:      Physical Exam   Constitutional: He is oriented to person, place, and time. He appears well-developed and well-nourished.   HENT:   Head: Normocephalic.   Eyes: Pupils are equal, round, and reactive to light.   Neck: Normal range of motion.   Cardiovascular: Normal rate.   Pulmonary/Chest: Effort normal.   Musculoskeletal:        Lumbar back: He exhibits decreased range of motion and tenderness.        Back:    Pain across low back with full range of motion of back.  Mild pain with flexion to 90 degrees, extension to 10 degrees and lateral rotation and bending to 45 degrees. No focal neurologic deficits.   Neurological: He is alert and oriented to person, place, and time.   Skin: Skin is warm and dry.   Psychiatric: He has a normal mood and affect.   Nursing note and vitals reviewed.      Assessment:       1. Strain of lumbar region, subsequent encounter    2. Lumbar strain, subsequent encounter    3. Chronic midline low back pain without sciatica        Plan:         Medications Ordered This Encounter    Medications    lidocaine (LIDODERM) 5 %     Sig: Place 1 patch onto the skin once daily. Remove & Discard patch within 12 hours or as directed by MD     Dispense:  30 patch     Refill:  0     Patient Instructions: Daily home exercises/warm soaks(Continue daily exercises demonstrated in office. Await transfer to less physical job duties.)   Restrictions: Regular Duty  Follow up in about 6 weeks (around 8/7/2019).

## 2019-06-26 NOTE — LETTER
[facility]  [address], [address]  [facility] [place] [address]  Phone: [phone]  Fax: [phone]  [facility]: [id number]    Pt Name: Mehul Randhawa  Injury Date: 08/25/2017   Employee ID: [id number] Date of Treatment: 06/26/2019   Company: [employer]      Appointment Time: 09:15 AM Arrived: 9:26 AM   Provider: Misael Hinojosa MD Time Out: 10:50 AM     Office Treatment:     1. Strain of lumbar region, subsequent encounter    2. Lumbar strain, subsequent encounter    3. Chronic midline low back pain without sciatica      Medications Ordered This Encounter   Medications    lidocaine (LIDODERM) 5 %      Patient Instructions: Daily home exercises/warm soaks(Continue daily exercises demonstrated in office. Await transfer to less physical job duties.)      Restrictions: Regular Duty     Return Appointment: 8/7/2019 at 9:30 AM  MR

## 2019-08-07 ENCOUNTER — OFFICE VISIT (OUTPATIENT)
Dept: URGENT CARE | Facility: CLINIC | Age: 49
End: 2019-08-07
Payer: COMMERCIAL

## 2019-08-07 DIAGNOSIS — S39.012D LUMBAR STRAIN, SUBSEQUENT ENCOUNTER: ICD-10-CM

## 2019-08-07 DIAGNOSIS — S39.012D STRAIN OF LUMBAR REGION, SUBSEQUENT ENCOUNTER: Primary | ICD-10-CM

## 2019-08-07 DIAGNOSIS — M54.50 CHRONIC MIDLINE LOW BACK PAIN WITHOUT SCIATICA: ICD-10-CM

## 2019-08-07 DIAGNOSIS — G89.29 CHRONIC MIDLINE LOW BACK PAIN WITHOUT SCIATICA: ICD-10-CM

## 2019-08-07 PROCEDURE — 99214 OFFICE O/P EST MOD 30 MIN: CPT | Mod: S$GLB,,, | Performed by: PREVENTIVE MEDICINE

## 2019-08-07 PROCEDURE — 99214 PR OFFICE/OUTPT VISIT, EST, LEVL IV, 30-39 MIN: ICD-10-PCS | Mod: S$GLB,,, | Performed by: PREVENTIVE MEDICINE

## 2019-08-07 RX ORDER — NAPROXEN 500 MG/1
500 TABLET ORAL 2 TIMES DAILY WITH MEALS
Qty: 60 TABLET | Refills: 1 | Status: SHIPPED | OUTPATIENT
Start: 2019-08-07 | End: 2019-10-30 | Stop reason: SDUPTHER

## 2019-08-07 RX ORDER — LIDOCAINE 50 MG/G
1 PATCH TOPICAL DAILY
Qty: 30 PATCH | Refills: 0 | Status: SHIPPED | OUTPATIENT
Start: 2019-08-07 | End: 2019-09-18 | Stop reason: SDUPTHER

## 2019-08-07 NOTE — LETTER
[facility]  [address], [address]  [place] [address]  Phone: [phone]  Fax: [phone]  [facility]: [id number]    Pt Name: Mehul Randhawa  Injury Date: 08/25/2017   Employee ID: [id number] Date of Treatment: 08/07/2019   Company: [employer]      Appointment Time: 09:15 AM Arrived: 9:27am   Provider: Misael Hinojosa MD Time Out:10:40am     Office Treatment:   RX GIVEN  1. Strain of lumbar region, subsequent encounter    2. Lumbar strain, subsequent encounter    3. Chronic midline low back pain without sciatica      Medications Ordered This Encounter   Medications    lidocaine (LIDODERM) 5 %    naproxen (NAPROSYN) 500 MG tablet      Patient Instructions: Daily home exercises/warm soaks    Restrictions: Regular Duty     Return Appointment: 9/18/2019 at 9:00AM  FAHAD

## 2019-08-07 NOTE — PROGRESS NOTES
Subjective:       Patient ID: Mehul Randhawa is a 48 y.o. male.    Chief Complaint: Back Pain     Follow-up of Back Pain ( DOI 08-25-17 ) Pain score today is 2/10 with complaint of Sharp/Pinching Pain of the right Lower Back.  wont pay for Lidoderm 5% patches, so he has been using OTC Icy Hot patches.     Review of Systems   Musculoskeletal: Positive for back pain.   Neurological: Negative for numbness.   All other systems reviewed and are negative.      Objective:      Physical Exam   Constitutional: He is oriented to person, place, and time. He appears well-developed and well-nourished.   HENT:   Head: Normocephalic and atraumatic.   Eyes: Pupils are equal, round, and reactive to light. EOM are normal.   Neck: Normal range of motion.   Cardiovascular: Normal rate and regular rhythm.   Pulmonary/Chest: Effort normal and breath sounds normal.   Musculoskeletal:        Lumbar back: He exhibits decreased range of motion, tenderness and pain. He exhibits no bony tenderness, no swelling, no edema, no deformity, no laceration, no spasm and normal pulse.        Back:    Pain about right low back with palpation and all range of motion testing. Pain with extension of back to 10 degrees. Pain otherwise at the extremes of flexion and lateral bending and rotation.   Neurological: He is alert and oriented to person, place, and time.   Skin: Skin is warm and dry.   Psychiatric: He has a normal mood and affect.   Nursing note and vitals reviewed.      Assessment:       1. Strain of lumbar region, subsequent encounter    2. Lumbar strain, subsequent encounter    3. Chronic midline low back pain without sciatica        Plan:         Medications Ordered This Encounter   Medications    lidocaine (LIDODERM) 5 %     Sig: Place 1 patch onto the skin once daily. Remove & Discard patch within 12 hours or as directed by MD. May substitute generic brand.     Dispense:  30 patch     Refill:  0    naproxen (NAPROSYN) 500 MG tablet      Sig: Take 1 tablet (500 mg total) by mouth 2 (two) times daily with meals.     Dispense:  60 tablet     Refill:  1     Patient Instructions: Daily home exercises/warm soaks   Restrictions: Regular Duty  Follow up in about 6 weeks (around 9/18/2019).

## 2019-09-18 ENCOUNTER — OFFICE VISIT (OUTPATIENT)
Dept: URGENT CARE | Facility: CLINIC | Age: 49
End: 2019-09-18
Payer: COMMERCIAL

## 2019-09-18 DIAGNOSIS — S39.012D STRAIN OF LUMBAR REGION, SUBSEQUENT ENCOUNTER: Primary | ICD-10-CM

## 2019-09-18 DIAGNOSIS — M54.50 CHRONIC MIDLINE LOW BACK PAIN WITHOUT SCIATICA: ICD-10-CM

## 2019-09-18 DIAGNOSIS — S39.012D LUMBAR STRAIN, SUBSEQUENT ENCOUNTER: ICD-10-CM

## 2019-09-18 DIAGNOSIS — G89.29 CHRONIC MIDLINE LOW BACK PAIN WITHOUT SCIATICA: ICD-10-CM

## 2019-09-18 PROCEDURE — 99214 OFFICE O/P EST MOD 30 MIN: CPT | Mod: S$GLB,,, | Performed by: PREVENTIVE MEDICINE

## 2019-09-18 PROCEDURE — 99214 PR OFFICE/OUTPT VISIT, EST, LEVL IV, 30-39 MIN: ICD-10-PCS | Mod: S$GLB,,, | Performed by: PREVENTIVE MEDICINE

## 2019-09-18 RX ORDER — LIDOCAINE 50 MG/G
1 PATCH TOPICAL DAILY
Qty: 30 PATCH | Refills: 2 | Status: SHIPPED | OUTPATIENT
Start: 2019-09-18 | End: 2019-10-30 | Stop reason: SDUPTHER

## 2019-09-18 NOTE — LETTER
Left message to have patient call MFM at [facility] [place]. Contact information left on VM. [facility]  [address], [address]  [place] [address]  Phone: [phone]  Fax: [phone]  [facility]: [id number]    Pt Name: Mehul Randhawa  Injury Date: 08/25/2017   Employee ID: [id number] Date of  Treatment: 09/18/2019   Company: [employer]      Appointment Time: 08:45 AM Arrived: 8:57am   Provider: Misael Hinojosa MD Time Out:9:55am     Office Treatment:     1. Strain of lumbar region, subsequent encounter    2. Lumbar strain, subsequent encounter    3. Chronic midline low back pain without sciatica      Medications Ordered This Encounter   Medications    lidocaine (LIDODERM) 5 %      Patient Instructions: Daily home exercises/warm soaks    Restrictions: Regular Duty     Return Appointment: 10/30/2019 at 8:30am

## 2019-09-18 NOTE — PROGRESS NOTES
Subjective:       Patient ID: Mehul Randhawa is a 49 y.o. male.    Chief Complaint: Back Pain (8/25/17)    Patient is a follow up for back pain from 8/25/17, [facility], 1/10, wearing back brace, pain meds help some, still trying to transfer to another department. Ambulatory. MJB    Back Pain   This is a recurrent problem. The current episode started more than 1 year ago. The problem occurs intermittently. The problem has been gradually improving since onset. The pain is present in the lumbar spine. The quality of the pain is described as aching. The pain does not radiate. The pain is at a severity of 1/10. The pain is mild. Stiffness is present at night. Pertinent negatives include no abdominal pain, chest pain, fever or headaches. He has tried NSAIDs for the symptoms. The treatment provided significant relief.     Review of Systems   Constitution: Negative for chills and fever.   HENT: Negative for sore throat.    Eyes: Negative for blurred vision.   Cardiovascular: Negative for chest pain.   Respiratory: Negative for shortness of breath.    Skin: Negative for rash.   Musculoskeletal: Positive for back pain. Negative for joint pain.   Gastrointestinal: Negative for abdominal pain, diarrhea, nausea and vomiting.   Neurological: Negative for headaches.   Psychiatric/Behavioral: The patient is not nervous/anxious.        Objective:      Physical Exam   Constitutional: He is oriented to person, place, and time. He appears well-developed and well-nourished.   HENT:   Head: Normocephalic and atraumatic.   Eyes: Pupils are equal, round, and reactive to light. EOM are normal.   Neck: Normal range of motion.   Cardiovascular: Normal rate and regular rhythm.   Pulmonary/Chest: Effort normal and breath sounds normal.   Musculoskeletal:        Lumbar back: He exhibits decreased range of motion, tenderness and pain. He exhibits no bony tenderness, no swelling, no edema, no deformity, no laceration, no spasm and normal pulse.         Back:    Pain persists about right low back with palpation and all range of motion testing. Pain with extension of back to 10 degrees. Patient is currently wearing a pain patch.  Pain persists at the extremes of flexion and lateral bending and rotation.   Neurological: He is alert and oriented to person, place, and time.   Skin: Skin is warm and dry.   Psychiatric: He has a normal mood and affect.   Nursing note and vitals reviewed.      Assessment:       1. Strain of lumbar region, subsequent encounter    2. Lumbar strain, subsequent encounter    3. Chronic midline low back pain without sciatica        Plan:     Continue Naprosyn 500 BID with food.     Medications Ordered This Encounter   Medications    lidocaine (LIDODERM) 5 %     Sig: Place 1 patch onto the skin once daily. Remove & Discard patch within 12 hours or as directed by MD. May substitute generic brand.     Dispense:  30 patch     Refill:  2     Patient Instructions: Daily home exercises/warm soaks   Restrictions: Regular Duty  Follow up in about 6 weeks (around 10/30/2019).

## 2019-10-30 ENCOUNTER — OFFICE VISIT (OUTPATIENT)
Dept: URGENT CARE | Facility: CLINIC | Age: 49
End: 2019-10-30
Payer: COMMERCIAL

## 2019-10-30 DIAGNOSIS — G89.29 CHRONIC MIDLINE LOW BACK PAIN WITHOUT SCIATICA: ICD-10-CM

## 2019-10-30 DIAGNOSIS — S39.012D LUMBAR STRAIN, SUBSEQUENT ENCOUNTER: ICD-10-CM

## 2019-10-30 DIAGNOSIS — S39.012D STRAIN OF LUMBAR REGION, SUBSEQUENT ENCOUNTER: Primary | ICD-10-CM

## 2019-10-30 DIAGNOSIS — M54.50 CHRONIC MIDLINE LOW BACK PAIN WITHOUT SCIATICA: ICD-10-CM

## 2019-10-30 PROCEDURE — 99214 OFFICE O/P EST MOD 30 MIN: CPT | Mod: S$GLB,,, | Performed by: PREVENTIVE MEDICINE

## 2019-10-30 PROCEDURE — 99214 PR OFFICE/OUTPT VISIT, EST, LEVL IV, 30-39 MIN: ICD-10-PCS | Mod: S$GLB,,, | Performed by: PREVENTIVE MEDICINE

## 2019-10-30 RX ORDER — NAPROXEN 500 MG/1
500 TABLET ORAL 2 TIMES DAILY WITH MEALS
Qty: 60 TABLET | Refills: 1 | Status: SHIPPED | OUTPATIENT
Start: 2019-10-30 | End: 2020-03-02 | Stop reason: SDUPTHER

## 2019-10-30 RX ORDER — LIDOCAINE 50 MG/G
1 PATCH TOPICAL DAILY
Qty: 30 PATCH | Refills: 2 | Status: SHIPPED | OUTPATIENT
Start: 2019-10-30 | End: 2020-03-02 | Stop reason: SDUPTHER

## 2019-10-30 NOTE — PROGRESS NOTES
Subjective:       Patient ID: Mehul Randhawa is a 49 y.o. male.    Chief Complaint: Back Pain (8/25/17)    Pt is a f/u for back pain from 8/25/17, WRD, 1/10, meds and back brace helps. Only wears the back brace at work. Pt states pain radiated down his right leg about 2-3 weeks ago lasting for a few hours. Pt states he is still trying to transfer to another department because he lifts more than 130 lbs daily. Ambulatory. MJB    Back Pain   This is a recurrent problem. The current episode started more than 1 year ago. The problem occurs daily. The problem has been gradually improving since onset. The pain is present in the lumbar spine. The quality of the pain is described as aching. Radiates to: right leg. The pain is at a severity of 1/10. The pain is mild. The symptoms are aggravated by standing and bending. Stiffness is present at night. Pertinent negatives include no abdominal pain, chest pain, fever or headaches. He has tried NSAIDs, ice, heat and bed rest for the symptoms. The treatment provided moderate relief.     Review of Systems   Constitution: Negative for chills and fever.   HENT: Negative for sore throat.    Eyes: Negative for blurred vision.   Cardiovascular: Negative for chest pain.   Respiratory: Negative for shortness of breath.    Skin: Negative for rash.   Musculoskeletal: Positive for back pain. Negative for joint pain.   Gastrointestinal: Negative for abdominal pain, diarrhea, nausea and vomiting.   Neurological: Negative for headaches.   Psychiatric/Behavioral: The patient is not nervous/anxious.        Objective:      Physical Exam   Constitutional: He is oriented to person, place, and time. He appears well-developed and well-nourished.   HENT:   Head: Normocephalic and atraumatic.   Eyes: Pupils are equal, round, and reactive to light. EOM are normal.   Neck: Normal range of motion.   Cardiovascular: Normal rate and regular rhythm.   Pulmonary/Chest: Effort normal and breath sounds normal.    Musculoskeletal:        Lumbar back: He exhibits decreased range of motion and tenderness. He exhibits no bony tenderness, no swelling, no edema, no deformity, no laceration, no pain, no spasm and normal pulse.        Back:    Mild pain primarily about right low back with palpation and all range of motion testing. Pain with extension of back to 10 degrees. Patient is currently wearing a pain patch.  Pain persists at the extremes of flexion and lateral bending and rotation.   Neurological: He is alert and oriented to person, place, and time.   Skin: Skin is warm and dry.   Psychiatric: He has a normal mood and affect.   Nursing note and vitals reviewed.      Assessment:       1. Strain of lumbar region, subsequent encounter    2. Lumbar strain, subsequent encounter    3. Chronic midline low back pain without sciatica        Plan:         Medications Ordered This Encounter   Medications    lidocaine (LIDODERM) 5 %     Sig: Place 1 patch onto the skin once daily. Remove & Discard patch within 12 hours or as directed by MD. May substitute generic brand.     Dispense:  30 patch     Refill:  2    naproxen (NAPROSYN) 500 MG tablet     Sig: Take 1 tablet (500 mg total) by mouth 2 (two) times daily with meals.     Dispense:  60 tablet     Refill:  1     Patient Instructions: Daily home exercises/warm soaks   Restrictions: Regular Duty  Follow up in about 6 weeks (around 12/11/2019).

## 2019-10-30 NOTE — LETTER
[facility] [place]  [address], [address]  [facility] [address]  Phone: [phone]  Fax: [phone]  [facility]: [id number]    Pt Name: Mehul Randhawa  Injury Date: 08/25/2017   Employee ID: [id number] Date of  Treatment: 10/30/2019   Company: [employer]      Appointment Time: 08:15 AM Arrived: 8:30am   Provider: Misael Hinojosa MD Time Out:9:15am     Office Treatment:     1. Strain of lumbar region, subsequent encounter    2. Lumbar strain, subsequent encounter    3. Chronic midline low back pain without sciatica      Medications Ordered This Encounter   Medications    lidocaine (LIDODERM) 5 %    naproxen (NAPROSYN) 500 MG tablet      Patient Instructions: Daily home exercises/warm soaks    Restrictions: Regular Duty     Return Appointment: 12/11/2019 at 9:00am  FAHAD

## 2019-12-11 ENCOUNTER — OFFICE VISIT (OUTPATIENT)
Dept: URGENT CARE | Facility: CLINIC | Age: 49
End: 2019-12-11
Payer: COMMERCIAL

## 2019-12-11 DIAGNOSIS — G89.29 CHRONIC MIDLINE LOW BACK PAIN WITHOUT SCIATICA: ICD-10-CM

## 2019-12-11 DIAGNOSIS — S39.012D LUMBAR STRAIN, SUBSEQUENT ENCOUNTER: ICD-10-CM

## 2019-12-11 DIAGNOSIS — S39.012D STRAIN OF LUMBAR REGION, SUBSEQUENT ENCOUNTER: Primary | ICD-10-CM

## 2019-12-11 DIAGNOSIS — M54.50 CHRONIC MIDLINE LOW BACK PAIN WITHOUT SCIATICA: ICD-10-CM

## 2019-12-11 PROCEDURE — 99214 PR OFFICE/OUTPT VISIT, EST, LEVL IV, 30-39 MIN: ICD-10-PCS | Mod: S$GLB,,, | Performed by: PREVENTIVE MEDICINE

## 2019-12-11 PROCEDURE — 99214 OFFICE O/P EST MOD 30 MIN: CPT | Mod: S$GLB,,, | Performed by: PREVENTIVE MEDICINE

## 2019-12-11 NOTE — PROGRESS NOTES
Subjective:       Patient ID: Mehlu Randhawa is a 49 y.o. male.    Chief Complaint: Back Pain (8/25/17)    Pt f/u for back pain from 8/25/17, WRD, meds prn helps, pain today is 1/10. C/o numbness down right leg two weeks ago. Ambulatory. MJB    Back Pain   This is a recurrent problem. The current episode started more than 1 year ago. The problem occurs intermittently. The problem has been gradually improving since onset. The pain is present in the lumbar spine. The quality of the pain is described as aching. Radiates to: right leg. The pain is at a severity of 1/10. The pain is mild. The symptoms are aggravated by position. Pertinent negatives include no chest pain, dysuria, fever or headaches. He has tried NSAIDs for the symptoms. The treatment provided moderate relief.       Constitution: Negative for chills, fatigue and fever.   HENT: Negative for congestion and sore throat.    Neck: Negative for painful lymph nodes.   Cardiovascular: Negative for chest pain and leg swelling.   Eyes: Negative for double vision and blurred vision.   Respiratory: Negative for cough and shortness of breath.    Gastrointestinal: Negative for nausea, vomiting and diarrhea.   Genitourinary: Negative for dysuria, frequency and urgency.   Musculoskeletal: Positive for back pain. Negative for joint pain, joint swelling, muscle cramps and muscle ache.   Skin: Negative for color change, pale and rash.   Allergic/Immunologic: Negative for seasonal allergies.   Neurological: Negative for dizziness, history of vertigo, light-headedness, passing out and headaches.   Hematologic/Lymphatic: Negative for swollen lymph nodes, easy bruising/bleeding and history of blood clots. Does not bruise/bleed easily.   Psychiatric/Behavioral: Negative for nervous/anxious, sleep disturbance and depression. The patient is not nervous/anxious.         Objective:      Physical Exam   Constitutional: He is oriented to person, place, and time. He appears well-developed  and well-nourished.   HENT:   Head: Normocephalic and atraumatic.   Eyes: Pupils are equal, round, and reactive to light. EOM are normal.   Neck: Normal range of motion.   Cardiovascular: Normal rate and regular rhythm.   Pulmonary/Chest: Effort normal and breath sounds normal.   Musculoskeletal:        Lumbar back: He exhibits decreased range of motion and tenderness. He exhibits no bony tenderness, no swelling, no edema, no deformity, no laceration, no pain, no spasm and normal pulse.        Back:    Back pain persists about right low back with palpation and all range of motion testing. Pain with flexion at 90 degrees, Lateral bending to 25 degrees and  extension of back to 10 degrees. Patient is currently wearing a pain patch.  Pain persists at the extremes of flexion and lateral bending and rotation.   Neurological: He is alert and oriented to person, place, and time.   Skin: Skin is warm and dry.   Psychiatric: He has a normal mood and affect.   Nursing note and vitals reviewed.      Assessment:       1. Strain of lumbar region, subsequent encounter    2. Lumbar strain, subsequent encounter    3. Chronic midline low back pain without sciatica        Plan:       Patient will continue taking Naprosyn 500 mg and Lidocaine Patch.      Patient Instructions: Daily home exercises/warm soaks testing 123 testing 123  Restrictions: Regular Duty  Follow up in about 6 weeks (around 1/22/2020).

## 2019-12-11 NOTE — LETTER
[facility] [place]  [address], [address]  [facility] [address]  Phone: [phone]  Fax: [phone]  [facility]: [id number]    Pt Name: Mehul Randhawa  Injury Date: 08/25/2017   Employee ID: [id number] Date of  Treatment: 12/11/2019   Company: [employer]      Appointment Time: 08:45 AM Arrived: 8:56am   Provider: [facility] Time Out:9:30am     Office Treatment:   1. Strain of lumbar region, subsequent encounter    2. Lumbar strain, subsequent encounter    3. Chronic midline low back pain without sciatica          Patient Instructions: Daily home exercises/warm soaks    Restrictions: Regular Duty     Return Appointment: 1/20/2020 at 8:30am  AFHAD

## 2020-01-21 ENCOUNTER — OFFICE VISIT (OUTPATIENT)
Dept: URGENT CARE | Facility: CLINIC | Age: 50
End: 2020-01-21
Payer: COMMERCIAL

## 2020-01-21 DIAGNOSIS — G89.29 CHRONIC MIDLINE LOW BACK PAIN WITHOUT SCIATICA: ICD-10-CM

## 2020-01-21 DIAGNOSIS — S39.012D STRAIN OF LUMBAR REGION, SUBSEQUENT ENCOUNTER: Primary | ICD-10-CM

## 2020-01-21 DIAGNOSIS — M54.50 CHRONIC MIDLINE LOW BACK PAIN WITHOUT SCIATICA: ICD-10-CM

## 2020-01-21 PROCEDURE — 99213 OFFICE O/P EST LOW 20 MIN: CPT | Mod: S$GLB,,, | Performed by: NURSE PRACTITIONER

## 2020-01-21 PROCEDURE — 99213 PR OFFICE/OUTPT VISIT, EST, LEVL III, 20-29 MIN: ICD-10-PCS | Mod: S$GLB,,, | Performed by: NURSE PRACTITIONER

## 2020-01-21 NOTE — PATIENT INSTRUCTIONS
Self-Care for Low Back Pain    Most people have low back pain now and then. In many cases, it isnt serious and self-care can help. Sometimes low back pain can be a sign of a bigger problem. Call your healthcare provider if your pain returns often or gets worse over time. For the long-term care of your back, get regular exercise, lose any excess weight and learn good posture.  Take a short rest  Lying down during the day may be beneficial for short periods of time if severe pain increases with sitting or standing. Long-term bed rest could be detrimental.  Reduce pain and swelling  Cold reduces swelling. Both cold and heat can reduce pain. Protect your skin by placing a towel between your body and the ice or heat source.  · For the first few days, apply an ice pack for 15 to 20 minutes .  · After the first few days, try heat for 15 minutes at a time to ease pain. Never sleep on a heating pad.  · Over-the-counter medicine can help control pain and swelling. Try aspirin or ibuprofen.  Exercise  Exercise can help your back heal. It also helps your back get stronger and more flexible, preventing any reinjury. Ask your healthcare provider about specific exercises for your back.  Use good posture to avoid reinjury  · When moving, bend at the hips and knees. Dont bend at the waist or twist around.  · When lifting, keep the object close to your body. Dont try to lift more than you can handle.  · When sitting, keep your lower back supported. Use a rolled-up towel as needed.  Seek immediate medical care if:  · Youre unable to stand or walk.  · You have a temperature over 100.4°F (38.0°C)  · You have frequent, painful, or bloody urination.  · You have severe abdominal pain.  · You have a sharp, stabbing pain.  · Your pain is constant.  · You have pain or numbness in your leg.  · You feel pain in a new area of your back.  · You notice that the pain isnt decreasing after more than a week.   Date Last Reviewed: 9/29/2015  ©  4894-0113 [employer]. [address] [place] [address]. All rights reserved. This information is not intended as a substitute for professional medical care. Always follow your healthcare professional's instructions.        Relieving Back Pain  Back pain is a common problem. You can strain back muscles by lifting too much weight or just by moving the wrong way. Back strain can be uncomfortable, even painful. And it can take weeks or months to improve. To help yourself feel better and prevent future back strains, try these tips.  Important Note: Do not give aspirin to children or teens without first discussing it with your healthcare provider.      ? Ice    Ice reduces muscle pain and swelling. It helps most during the first 24 to 48 hours after an injury.  · Wrap an ice pack or a bag of frozen peas in a thin towel. (Never place ice directly on your skin.)  · Place the ice where your back hurts the most.  · Dont ice for more than 20 minutes at a time.  · You can use ice several times a day.  ? Medicines  Over-the-counter pain relievers can include acetaminophen and anti-inflammatory medicines, which includes aspirin or ibuprofen. They can help ease discomfort. Some also reduce swelling.  · Tell your healthcare provider about any medicines you are already taking.  · Take medicines only as directed.  ? Heat  After the first 48 hours, heat can relax sore muscles and improve blood flow.  · Try a warm bath or shower. Or use a heating pad set on low. To prevent a burn, keep a cloth between you and the heating pad.  · Dont use a heating pad for more than 15 minutes at a time. Never sleep on a heating pad.  Date Last Reviewed: 9/1/2015  © 1859-1835 [employer]. [address] [place] [address]. All rights reserved. This information is not intended as a substitute for professional medical care. Always follow your healthcare professional's instructions.        Back Safety: Poor  Posture Hurts  An unhealthy spine often starts with bad habits. Poor movement patterns and posture problems are common causes of back pain. Disk, bone, nerve, and soft tissue problems can all be affected by poor posture. They can lead to pain, stiffness, and other symptoms.    Poor posture backfires  Poor posture can cause pain. Too much slouching puts increased pressure on the disks. An excessive lumbar curve can overload and inflame the vertebrae. As a result, the back muscles may tighten or spasm to splint and protect the spine. This adds to the pain you feel.    Proper posture: The key to safe movement  Your spine bears your weight throughout the day. This is true whether youre sleeping, standing, or bending. Certain positions strain your spine more than others. But by maintaining proper posture in all positions, you can reduce the stress on your spine.       To improve your standing posture, follow these steps:  · Breathe deeply.  · Relax your shoulders, hips, and ankles. · Think of the ears, shoulders, hips, and ankles as a series of dots. Now, adjust your body to connect the dots in a straight line.  · Tuck your buttocks in just a bit if you need to.      Date Last Reviewed: 10/28/2015  © 2065-9858 [employer]. [address] [place] [address], [place] [address]. All rights reserved. This information is not intended as a substitute for professional medical care. Always follow your healthcare professional's instructions.

## 2020-01-21 NOTE — PROGRESS NOTES
Subjective:       Patient ID: Mehul Randhawa is a 49 y.o. male.    Chief Complaint: Back Pain    RV- Pt  Is being seen today for his  back pain injury at work  - [employer]  -DOI8/25/17, WRD, meds prn helps, pain today is 1/10. Pt c/o right side lower back  pain (aching) pain was 3/10 last for a week . Ambulatory. Pt work status is regular duty-LN  REPORTS NO CHANGE IN STATUS-DMG    Back Pain   This is a recurrent problem. The current episode started more than 1 year ago. The problem occurs intermittently. The problem has been gradually improving since onset. The pain is present in the lumbar spine. The quality of the pain is described as aching. Radiates to: right leg. The pain is at a severity of 1/10. The pain is mild. The symptoms are aggravated by position. Pertinent negatives include no dysuria, fever, headaches, numbness or tingling. He has tried NSAIDs for the symptoms. The treatment provided moderate relief.       Constitution: Negative for chills and fever.   HENT: Negative for congestion.    Neck: Negative for neck pain, neck stiffness and painful lymph nodes.   Respiratory: Negative for chest tightness, cough and shortness of breath.    Gastrointestinal: Negative for nausea, vomiting and heartburn.   Endocrine: cold intolerance and heat intolerance.   Genitourinary: Negative for dysuria, frequency and urgency.   Musculoskeletal: Positive for back pain. Negative for pain, joint pain, pain with walking, muscle cramps and muscle ache.   Skin: Negative for color change, pale and bruising.   Allergic/Immunologic: Negative for seasonal allergies and chronic cough.   Neurological: Negative for dizziness, light-headedness, headaches, altered mental status, numbness and tingling.   Hematologic/Lymphatic: Negative for swollen lymph nodes and easy bruising/bleeding. Does not bruise/bleed easily.   Psychiatric/Behavioral: Negative for altered mental status and nervous/anxious. The patient is not nervous/anxious.          Objective:      Physical Exam   Constitutional: He is oriented to person, place, and time. He appears well-developed and well-nourished.   HENT:   Head: Normocephalic and atraumatic.   Eyes: Pupils are equal, round, and reactive to light. Conjunctivae and EOM are normal.   Neck: Normal range of motion. Neck supple.   Cardiovascular: Normal rate and regular rhythm.   Pulmonary/Chest: Effort normal and breath sounds normal.   Abdominal: Soft. Bowel sounds are normal.   Musculoskeletal: He exhibits tenderness.        Right hip: Normal.        Left hip: Normal.        Thoracic back: Normal.        Lumbar back: He exhibits decreased range of motion, tenderness and pain.        Back:    Neurological: He is alert and oriented to person, place, and time. He displays normal reflexes. No cranial nerve deficit or sensory deficit. He exhibits normal muscle tone. Coordination and gait normal. GCS eye subscore is 4. GCS verbal subscore is 5. GCS motor subscore is 6.   Reflex Scores:       Patellar reflexes are 2+ on the right side and 2+ on the left side.       Achilles reflexes are 2+ on the right side and 2+ on the left side.  Skin: Skin is warm, dry and intact. Capillary refill takes less than 2 seconds.   Psychiatric: He has a normal mood and affect. His speech is normal and behavior is normal. Judgment and thought content normal. Cognition and memory are normal.       Assessment:       1. Strain of lumbar region, subsequent encounter    2. Chronic midline low back pain without sciatica        Plan:       Mehul was seen today for back pain.    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

## 2020-01-21 NOTE — LETTER
[facility] [place]  [address], [address]  [facility] [address]  Phone: [phone]  Fax: [phone]  [facility]: [id number]    Pt Name: Mehul Randhawa  Injury Date: 08/25/2017   Employee ID: [id number] Date of Treatment: 01/21/2020   Company: [employer]      Appointment Time: 09:30 AM Arrived: 9:26 AM   Provider: Salomon Chinchilla NP Time Out: 10:15 AM     Office Treatment:   1. Strain of lumbar region, subsequent encounter    2. Chronic midline low back pain without sciatica          Patient Instructions: Daily home exercises/warm soaks    Restrictions: Regular Duty     Return Appointment: 3/2/2020 at 9:30 AM       SH

## 2020-03-02 ENCOUNTER — OFFICE VISIT (OUTPATIENT)
Dept: URGENT CARE | Facility: CLINIC | Age: 50
End: 2020-03-02
Payer: COMMERCIAL

## 2020-03-02 DIAGNOSIS — S39.012D STRAIN OF LUMBAR REGION, SUBSEQUENT ENCOUNTER: Primary | ICD-10-CM

## 2020-03-02 DIAGNOSIS — G89.29 CHRONIC MIDLINE LOW BACK PAIN WITHOUT SCIATICA: ICD-10-CM

## 2020-03-02 DIAGNOSIS — M54.50 CHRONIC MIDLINE LOW BACK PAIN WITHOUT SCIATICA: ICD-10-CM

## 2020-03-02 DIAGNOSIS — M47.816 OSTEOARTHRITIS OF LUMBAR SPINE, UNSPECIFIED SPINAL OSTEOARTHRITIS COMPLICATION STATUS: ICD-10-CM

## 2020-03-02 DIAGNOSIS — S39.012D LUMBAR STRAIN, SUBSEQUENT ENCOUNTER: ICD-10-CM

## 2020-03-02 PROCEDURE — 99214 PR OFFICE/OUTPT VISIT, EST, LEVL IV, 30-39 MIN: ICD-10-PCS | Mod: S$GLB,,, | Performed by: PREVENTIVE MEDICINE

## 2020-03-02 PROCEDURE — 99214 OFFICE O/P EST MOD 30 MIN: CPT | Mod: S$GLB,,, | Performed by: PREVENTIVE MEDICINE

## 2020-03-02 RX ORDER — NAPROXEN 500 MG/1
500 TABLET ORAL 2 TIMES DAILY WITH MEALS
Qty: 60 TABLET | Refills: 1 | Status: SHIPPED | OUTPATIENT
Start: 2020-03-02 | End: 2020-05-25 | Stop reason: SDUPTHER

## 2020-03-02 RX ORDER — LIDOCAINE 50 MG/G
1 PATCH TOPICAL DAILY
Qty: 30 PATCH | Refills: 2 | Status: SHIPPED | OUTPATIENT
Start: 2020-03-02 | End: 2020-05-25 | Stop reason: SDUPTHER

## 2020-03-02 NOTE — LETTER
[facility] [place]  [address], [address]  [facility] [address]  Phone: [phone]  Fax: [phone]  [facility]: [id number]    Pt Name: Mehul Randhawa  Injury Date: 08/25/2017   Employee ID: [id number] Date of Treatment: 03/02/2020   Company: [employer]      Appointment Time: 09:30 AM Arrived: 9:28 AM   Provider: Misael Hinojosa MD Time Out: 11:05 AM     Office Treatment:   1. Strain of lumbar region, subsequent encounter    2. Chronic midline low back pain without sciatica    3. Lumbar strain, subsequent encounter    4. Osteoarthritis of lumbar spine, unspecified spinal osteoarthritis complication status      Medications Ordered This Encounter   Medications    lidocaine (LIDODERM) 5 %    naproxen (NAPROSYN) 500 MG tablet      Patient Instructions: Daily home exercises/warm soaks    Restrictions: Regular Duty     Return Appointment: 4/13/2020 at 9:30 AM       SH

## 2020-03-02 NOTE — PROGRESS NOTES
Subjective:       Patient ID: Mehul Randhawa is a 49 y.o. male.    Chief Complaint: Back Pain     RV- Pt is being seen today for his lower  back injury at work - [employer]- DOI -8/25/17. Pt pain today is 2/10.Pt c/o of tingling left leg start yesterday  .-LN    Back Pain   This is a recurrent problem. The current episode started more than 1 year ago. The problem occurs intermittently. The problem has been gradually improving since onset. The pain is present in the lumbar spine. The quality of the pain is described as aching. Radiates to: right leg. The pain is at a severity of 2/10. The pain is mild. The symptoms are aggravated by position, bending, sitting and standing. Pertinent negatives include no fever, headaches, numbness or tingling. He has tried NSAIDs for the symptoms. The treatment provided moderate relief.       Constitution: Negative for fever.   HENT: Negative for congestion.    Musculoskeletal: Positive for back pain. Negative for pain, joint pain, pain with walking, muscle cramps and muscle ache.   Neurological: Positive for tingling. Negative for headaches and numbness.        Objective:      Physical Exam   Constitutional: He is oriented to person, place, and time. He appears well-developed and well-nourished.   HENT:   Head: Normocephalic and atraumatic.   Eyes: Pupils are equal, round, and reactive to light. EOM are normal.   Neck: Normal range of motion. Neck supple.   Cardiovascular: Normal rate and regular rhythm.   Pulmonary/Chest: Effort normal and breath sounds normal.   Musculoskeletal:        Lumbar back: He exhibits decreased range of motion, tenderness and pain. He exhibits no bony tenderness, no swelling, no edema, no deformity, no laceration, no spasm and normal pulse.        Back:    Pain persists across low back radiating to left leg with both palpation and range of motion testing.  Patient has complaints of pain with forward flexion to approximately 45°, extension to 25°, and lateral  bending to 25°.  He has no motor or sensory deficits about his lower extremities.  No swelling or spasm is noted in his left low back region.  Distal pulses are equal intact.   Neurological: He is alert and oriented to person, place, and time.   Skin: Skin is warm and dry.   Psychiatric: He has a normal mood and affect.   Nursing note and vitals reviewed.      Assessment:       1. Strain of lumbar region, subsequent encounter    2. Chronic midline low back pain without sciatica    3. Lumbar strain, subsequent encounter    4. Osteoarthritis of lumbar spine, unspecified spinal osteoarthritis complication status        Plan:     discussed with patient the results of the previous MRI.  Repeat MRI testing may be warranted if his symptoms become more severe and consistent.    Medications Ordered This Encounter   Medications    lidocaine (LIDODERM) 5 %     Sig: Place 1 patch onto the skin once daily. Remove & Discard patch within 12 hours or as directed by MD. May substitute generic brand.     Dispense:  30 patch     Refill:  2    naproxen (NAPROSYN) 500 MG tablet     Sig: Take 1 tablet (500 mg total) by mouth 2 (two) times daily with meals.     Dispense:  60 tablet     Refill:  1     Patient Instructions: Daily home exercises/warm soaks   Restrictions: Regular Duty  Follow up in about 6 weeks (around 4/13/2020).

## 2020-04-13 ENCOUNTER — TELEPHONE (OUTPATIENT)
Dept: URGENT CARE | Facility: CLINIC | Age: 50
End: 2020-04-13

## 2020-04-13 NOTE — TELEPHONE ENCOUNTER
Patient called and wants to reschedule his appointment that he missed this morning. I asked him to verify his . Patient states that since he missed his appointment can I schedule him in 6 weeks, I informed him that he would have to be seen sooner than that and I would have someone from the clinic to call and speak to him.

## 2020-05-25 ENCOUNTER — OFFICE VISIT (OUTPATIENT)
Dept: URGENT CARE | Facility: CLINIC | Age: 50
End: 2020-05-25
Payer: COMMERCIAL

## 2020-05-25 VITALS — BODY MASS INDEX: 22.88 KG/M2 | WEIGHT: 134 LBS | TEMPERATURE: 98 F | HEIGHT: 64 IN | OXYGEN SATURATION: 96 %

## 2020-05-25 DIAGNOSIS — G89.29 CHRONIC MIDLINE LOW BACK PAIN WITHOUT SCIATICA: ICD-10-CM

## 2020-05-25 DIAGNOSIS — M54.50 CHRONIC MIDLINE LOW BACK PAIN WITHOUT SCIATICA: ICD-10-CM

## 2020-05-25 DIAGNOSIS — S39.012D LUMBAR STRAIN, SUBSEQUENT ENCOUNTER: ICD-10-CM

## 2020-05-25 DIAGNOSIS — M47.816 OSTEOARTHRITIS OF LUMBAR SPINE, UNSPECIFIED SPINAL OSTEOARTHRITIS COMPLICATION STATUS: ICD-10-CM

## 2020-05-25 DIAGNOSIS — S39.012D STRAIN OF LUMBAR REGION, SUBSEQUENT ENCOUNTER: Primary | ICD-10-CM

## 2020-05-25 PROCEDURE — 99214 PR OFFICE/OUTPT VISIT, EST, LEVL IV, 30-39 MIN: ICD-10-PCS | Mod: S$GLB,,, | Performed by: PREVENTIVE MEDICINE

## 2020-05-25 PROCEDURE — 99214 OFFICE O/P EST MOD 30 MIN: CPT | Mod: S$GLB,,, | Performed by: PREVENTIVE MEDICINE

## 2020-05-25 RX ORDER — NAPROXEN 500 MG/1
500 TABLET ORAL 2 TIMES DAILY WITH MEALS
Qty: 60 TABLET | Refills: 1 | Status: SHIPPED | OUTPATIENT
Start: 2020-05-25 | End: 2020-07-08 | Stop reason: SDUPTHER

## 2020-05-25 RX ORDER — LIDOCAINE 50 MG/G
1 PATCH TOPICAL DAILY
Qty: 30 PATCH | Refills: 2 | Status: SHIPPED | OUTPATIENT
Start: 2020-05-25 | End: 2020-07-08 | Stop reason: SDUPTHER

## 2020-05-25 NOTE — PROGRESS NOTES
Subjective:       Patient ID: Mehul Randhawa is a 49 y.o. male.    Chief Complaint: Back Pain (lower)    RV, Lower back (DOI 8/25/2017) [employer]- Patient states his back feels the same with no changes because he is doing heavy lifting at work. He describes the pain as aching and sharp pain that comes and goes. Pain level 1/10 on today. He is currently taking Naproxen, lidoderm patch and soaks to help. [place]    Back Pain   The current episode started more than 1 month ago. The problem occurs daily. The problem is unchanged. The pain is present in the lumbar spine. The quality of the pain is described as aching (sharp). The pain radiates to the right thigh. The pain is at a severity of 1/10. The patient is experiencing no pain. The pain is worse during the day. The symptoms are aggravated by standing and bending. Associated symptoms include leg pain. Pertinent negatives include no abdominal pain, bladder incontinence, bowel incontinence, chest pain, dysuria, fever, headaches, numbness, paresis, paresthesias, pelvic pain, perianal numbness, tingling, weakness or weight loss. He has tried heat and NSAIDs (lidoderm patches) for the symptoms. The treatment provided moderate relief.       Constitution: Negative for fatigue and fever.   HENT: Negative.    Neck: negative.   Cardiovascular: Negative.  Negative for chest pain.   Eyes: Negative.    Respiratory: Negative.    Gastrointestinal: Negative for abdominal pain and bowel incontinence.   Endocrine: negative.   Genitourinary: Negative for dysuria, urgency, bladder incontinence, hematuria and pelvic pain.   Musculoskeletal: Positive for pain, back pain and muscle ache. Negative for muscle cramps and history of spine disorder.   Skin: Negative for rash.   Allergic/Immunologic: Negative.    Neurological: Negative for coordination disturbances, headaches, numbness and tingling.   Hematologic/Lymphatic: Negative.    Psychiatric/Behavioral: Negative.         Objective:      Physical  Exam   Constitutional: He is oriented to person, place, and time. He appears well-developed and well-nourished.   HENT:   Head: Normocephalic and atraumatic.   Eyes: Pupils are equal, round, and reactive to light. EOM are normal.   Neck: Normal range of motion.   Cardiovascular: Normal rate and regular rhythm.   Pulmonary/Chest: Effort normal and breath sounds normal.   Musculoskeletal:        Lumbar back: He exhibits decreased range of motion and tenderness. He exhibits no bony tenderness, no swelling, no edema, no deformity, no laceration, no pain, no spasm and normal pulse.        Back:    Back pain persists about right low back with palpation and all range of motion testing. Pain with flexion at 90 degrees, Lateral bending to 25 degrees and  extension of back to 10 degrees.  Pain persists at the extremes of flexion and lateral bending and rotation.   Neurological: He is alert and oriented to person, place, and time.   Skin: Skin is warm and dry.   Psychiatric: He has a normal mood and affect.   Nursing note and vitals reviewed.      Assessment:       1. Strain of lumbar region, subsequent encounter    2. Chronic midline low back pain without sciatica    3. Lumbar strain, subsequent encounter    4. Osteoarthritis of lumbar spine, unspecified spinal osteoarthritis complication status        Plan:         Medications Ordered This Encounter   Medications    lidocaine (LIDODERM) 5 %     Sig: Place 1 patch onto the skin once daily. Remove & Discard patch within 12 hours or as directed by MD. May substitute generic brand.     Dispense:  30 patch     Refill:  2    naproxen (NAPROSYN) 500 MG tablet     Sig: Take 1 tablet (500 mg total) by mouth 2 (two) times daily with meals.     Dispense:  60 tablet     Refill:  1     Patient Instructions: Daily home exercises/warm soaks   Restrictions: Regular Duty  Follow up in about 6 weeks (around 7/8/2020).

## 2020-05-25 NOTE — LETTER
[facility]  [address]  [place] [address]  Phone: [phone]  Fax: [phone]  [facility]: [id number]    Pt Name: Mehul Randhawa  Injury Date: 08/25/2017   Employee ID: [id number] Date of Treatment: 05/25/2020   Company: [employer]      Appointment Time: 10:00 AM Arrived: 9:59 AM   Provider: Misael Hinojosa MD Time Out: 11:00 AM     Office Treatment:   1. Strain of lumbar region, subsequent encounter    2. Chronic midline low back pain without sciatica    3. Lumbar strain, subsequent encounter    4. Osteoarthritis of lumbar spine, unspecified spinal osteoarthritis complication status      Medications Ordered This Encounter   Medications    lidocaine (LIDODERM) 5 %    naproxen (NAPROSYN) 500 MG tablet      Patient Instructions: Daily home exercises/warm soaks    Restrictions: Regular Duty     Return Appointment:     7/8/2020 at 9:30 AM       SH

## 2020-07-08 ENCOUNTER — OFFICE VISIT (OUTPATIENT)
Dept: URGENT CARE | Facility: CLINIC | Age: 50
End: 2020-07-08
Payer: COMMERCIAL

## 2020-07-08 DIAGNOSIS — G89.29 CHRONIC MIDLINE LOW BACK PAIN WITHOUT SCIATICA: ICD-10-CM

## 2020-07-08 DIAGNOSIS — S39.012D STRAIN OF LUMBAR REGION, SUBSEQUENT ENCOUNTER: Primary | ICD-10-CM

## 2020-07-08 DIAGNOSIS — M47.816 OSTEOARTHRITIS OF LUMBAR SPINE, UNSPECIFIED SPINAL OSTEOARTHRITIS COMPLICATION STATUS: ICD-10-CM

## 2020-07-08 DIAGNOSIS — S39.012D LUMBAR STRAIN, SUBSEQUENT ENCOUNTER: ICD-10-CM

## 2020-07-08 DIAGNOSIS — M54.50 CHRONIC MIDLINE LOW BACK PAIN WITHOUT SCIATICA: ICD-10-CM

## 2020-07-08 PROCEDURE — 99214 PR OFFICE/OUTPT VISIT, EST, LEVL IV, 30-39 MIN: ICD-10-PCS | Mod: S$GLB,,, | Performed by: PREVENTIVE MEDICINE

## 2020-07-08 PROCEDURE — 99214 OFFICE O/P EST MOD 30 MIN: CPT | Mod: S$GLB,,, | Performed by: PREVENTIVE MEDICINE

## 2020-07-08 RX ORDER — NAPROXEN 500 MG/1
500 TABLET ORAL 2 TIMES DAILY WITH MEALS
Qty: 60 TABLET | Refills: 1 | Status: SHIPPED | OUTPATIENT
Start: 2020-07-08 | End: 2020-09-30 | Stop reason: ALTCHOICE

## 2020-07-08 RX ORDER — LIDOCAINE 50 MG/G
1 PATCH TOPICAL DAILY
Qty: 30 PATCH | Refills: 2 | Status: SHIPPED | OUTPATIENT
Start: 2020-07-08 | End: 2020-09-30 | Stop reason: SDUPTHER

## 2020-07-08 NOTE — PROGRESS NOTES
Subjective:       Patient ID: Mehul Randhawa is a 49 y.o. male.    Chief Complaint: Back Pain    WC Follow-up of Lower Back Paoin ( DOI 08- ) Pain score 1/10 with complaints of Prolonged sitting causes Stabbing pain, Picking up heavy boxes causes increased pain, Intermittent Stiffness of Lower Back, Feels Fatigue of RT Leg, No numbness/tingling reported. Taking Naprosyn 500mg, using Lidoderm 5% patches and doing Daily home exercises w/wm soaks. SH      Constitution: Negative for fatigue.   Gastrointestinal: Negative for abdominal pain and bowel incontinence.   Genitourinary: Negative for dysuria, urgency, bladder incontinence and hematuria.   Musculoskeletal: Positive for joint pain and back pain. Negative for muscle cramps and history of spine disorder.   Skin: Negative for rash.   Neurological: Negative for coordination disturbances, numbness and tingling.        Objective:      Physical Exam  Vitals signs and nursing note reviewed.   Constitutional:       Appearance: He is well-developed.   HENT:      Head: Normocephalic and atraumatic.   Eyes:      Pupils: Pupils are equal, round, and reactive to light.   Neck:      Musculoskeletal: Normal range of motion.   Cardiovascular:      Rate and Rhythm: Normal rate and regular rhythm.   Pulmonary:      Effort: Pulmonary effort is normal.      Breath sounds: Normal breath sounds.   Musculoskeletal:      Lumbar back: He exhibits decreased range of motion and tenderness. He exhibits no bony tenderness, no swelling, no edema, no deformity, no laceration, no pain, no spasm and normal pulse.        Back:       Comments: Back pain primarily about right low back with palpation and all range of motion testing. Pain about low back with flexion at 90 degrees, Lateral bending to 25 degrees and  extension of back to 10 degrees.  Pain persists at the extremes of flexion and lateral bending and rotation.   Skin:     General: Skin is warm and dry.   Neurological:      Mental Status:  He is alert and oriented to person, place, and time.         Assessment:       1. Strain of lumbar region, subsequent encounter    2. Chronic midline low back pain without sciatica    3. Lumbar strain, subsequent encounter    4. Osteoarthritis of lumbar spine, unspecified spinal osteoarthritis complication status        Plan:       This patient has had intermittent pain about his right low back with weakness in his right leg since his last office visit.  He may need a repeat repeat MRI if these symptoms persist.  Medications Ordered This Encounter   Medications    lidocaine (LIDODERM) 5 %     Sig: Place 1 patch onto the skin once daily. Remove & Discard patch within 12 hours or as directed by MD. May substitute generic brand.     Dispense:  30 patch     Refill:  2    naproxen (NAPROSYN) 500 MG tablet     Sig: Take 1 tablet (500 mg total) by mouth 2 (two) times daily with meals.     Dispense:  60 tablet     Refill:  1     Patient Instructions: Daily home exercises/warm soaks   Restrictions: Regular Duty  Follow up in about 6 weeks (around 8/19/2020).

## 2020-07-08 NOTE — LETTER
[facility] [place]  [address] [facility] [address], [address]  [facility] [place] [facility] [address]  Phone: [phone]  Fax: [phone]  [facility]: [id number]    Pt Name: Mehul Randhawa  Injury Date: 08/25/2017   Employee ID: [id number] Date of First Treatment: 07/08/2020   Company: [employer]      Appointment Time: 09:30 AM Arrived: 9:28 AM   Provider: Misael Hinojosa MD Time Out: 10:19 AM     Office Treatment:   1. Strain of lumbar region, subsequent encounter    2. Chronic midline low back pain without sciatica    3. Lumbar strain, subsequent encounter    4. Osteoarthritis of lumbar spine, unspecified spinal osteoarthritis complication status      Medications Ordered This Encounter   Medications    lidocaine (LIDODERM) 5 %    naproxen (NAPROSYN) 500 MG tablet      Patient Instructions: Daily home exercises/warm soaks    Restrictions: Regular Duty     Return Appointment: 8/19/2020 at 9:30 AM       MARCELL

## 2020-08-19 ENCOUNTER — OFFICE VISIT (OUTPATIENT)
Dept: URGENT CARE | Facility: CLINIC | Age: 50
End: 2020-08-19
Payer: COMMERCIAL

## 2020-08-19 DIAGNOSIS — S39.012D STRAIN OF LUMBAR REGION, SUBSEQUENT ENCOUNTER: Primary | ICD-10-CM

## 2020-08-19 DIAGNOSIS — M47.816 OSTEOARTHRITIS OF LUMBAR SPINE, UNSPECIFIED SPINAL OSTEOARTHRITIS COMPLICATION STATUS: ICD-10-CM

## 2020-08-19 DIAGNOSIS — M54.50 CHRONIC MIDLINE LOW BACK PAIN WITHOUT SCIATICA: ICD-10-CM

## 2020-08-19 DIAGNOSIS — S39.012D LUMBAR STRAIN, SUBSEQUENT ENCOUNTER: ICD-10-CM

## 2020-08-19 DIAGNOSIS — G89.29 CHRONIC MIDLINE LOW BACK PAIN WITHOUT SCIATICA: ICD-10-CM

## 2020-08-19 PROCEDURE — 99214 OFFICE O/P EST MOD 30 MIN: CPT | Mod: S$GLB,,, | Performed by: PREVENTIVE MEDICINE

## 2020-08-19 PROCEDURE — 99214 PR OFFICE/OUTPT VISIT, EST, LEVL IV, 30-39 MIN: ICD-10-PCS | Mod: S$GLB,,, | Performed by: PREVENTIVE MEDICINE

## 2020-08-19 NOTE — PROGRESS NOTES
Subjective:       Patient ID: Mehul Randhawa is a 49 y.o. male.    Chief Complaint: Back Pain    WC Follow up for Back Pain (DOI 8/25/2017). Pain score today is 2/10 with complaints of back pain when bending, picking up heavy boxes and when walking. Patient describes pain as stabbing pain. He is still having some fatigue in the RT leg at times. Patient states medication is working as well as the daily home exercises. ERW    Back Pain  Pertinent negatives include no abdominal pain, bladder incontinence, bowel incontinence, dysuria or numbness.       Constitution: Negative for fatigue.   Gastrointestinal: Negative for abdominal pain and bowel incontinence.   Genitourinary: Negative for dysuria, urgency, bladder incontinence and hematuria.   Musculoskeletal: Positive for pain, back pain, pain with walking and muscle ache. Negative for joint pain, muscle cramps and history of spine disorder.   Skin: Negative for rash.   Neurological: Negative for coordination disturbances, numbness and tingling.        Objective:      Physical Exam  Musculoskeletal:      Lumbar back: He exhibits decreased range of motion, tenderness and pain. He exhibits no bony tenderness, no swelling, no edema, no deformity, no laceration, no spasm and normal pulse.        Back:       Comments: Patient has had intermittent pain about his lower back since his last office visit.  At this time he has complaints of pain with palpation about the right lower back as well as with range of motion testing.  He has pain about his right low back with full flexion to over 90° of his lower back.  Extension to 25°, and lateral bending and rotation to 45°.  He has no motor or sensory deficits about his lower extremities.  He has no pain radiating to his right leg at this time.         Assessment:       1. Strain of lumbar region, subsequent encounter    2. Chronic midline low back pain without sciatica    3. Lumbar strain, subsequent encounter    4. Osteoarthritis of  lumbar spine, unspecified spinal osteoarthritis complication status        Plan:     since patient's low-back pain has remained primarily intermittent without radiating pain to his right leg and without weakness in his right leg, an MRI will be put on hold at this time.  However if the symptoms do reoccur and persist, an MRI will be ordered on his next office visit.  Patient will continue using lidocaine 5% patch regularly as well as taking Naprosyn 500 mg twice a day with food as needed.       Patient Instructions: Daily home exercises/warm soaks   Restrictions: Regular Duty  Follow up in about 6 weeks (around 9/30/2020).

## 2020-08-19 NOTE — LETTER
[facility] [place]  [address], [address]  [place] [address]  Phone: [phone]  Fax: [phone]  [facility]: [id number]    Pt Name: Mehul Randhawa  Injury Date: 08/25/2017   Employee ID: [id number] Date of Treatment: 08/19/2020   Company: [employer]      Appointment Time: 09:30 AM Arrived: 9:29 AM   Provider: Misael Hinojosa MD Time Out:10:27 AM     Office Treatment:   1. Strain of lumbar region, subsequent encounter    2. Chronic midline low back pain without sciatica    3. Lumbar strain, subsequent encounter    4. Osteoarthritis of lumbar spine, unspecified spinal osteoarthritis complication status          Patient Instructions: Daily home exercises/warm soaks    Restrictions: Regular Duty     Return Appointment: 9/30/2020 at 9:30 AM  ERW

## 2020-09-30 ENCOUNTER — OFFICE VISIT (OUTPATIENT)
Dept: URGENT CARE | Facility: CLINIC | Age: 50
End: 2020-09-30
Payer: COMMERCIAL

## 2020-09-30 DIAGNOSIS — M54.50 CHRONIC MIDLINE LOW BACK PAIN WITHOUT SCIATICA: ICD-10-CM

## 2020-09-30 DIAGNOSIS — M47.816 OSTEOARTHRITIS OF LUMBAR SPINE, UNSPECIFIED SPINAL OSTEOARTHRITIS COMPLICATION STATUS: ICD-10-CM

## 2020-09-30 DIAGNOSIS — S39.012D STRAIN OF LUMBAR REGION, SUBSEQUENT ENCOUNTER: Primary | ICD-10-CM

## 2020-09-30 DIAGNOSIS — S39.012D LUMBAR STRAIN, SUBSEQUENT ENCOUNTER: ICD-10-CM

## 2020-09-30 DIAGNOSIS — G89.29 CHRONIC MIDLINE LOW BACK PAIN WITHOUT SCIATICA: ICD-10-CM

## 2020-09-30 PROCEDURE — 99214 OFFICE O/P EST MOD 30 MIN: CPT | Mod: S$GLB,,, | Performed by: PREVENTIVE MEDICINE

## 2020-09-30 PROCEDURE — 99214 PR OFFICE/OUTPT VISIT, EST, LEVL IV, 30-39 MIN: ICD-10-PCS | Mod: S$GLB,,, | Performed by: PREVENTIVE MEDICINE

## 2020-09-30 RX ORDER — NAPROXEN 500 MG/1
500 TABLET ORAL 2 TIMES DAILY WITH MEALS
Qty: 60 TABLET | Refills: 1 | Status: SHIPPED | OUTPATIENT
Start: 2020-09-30 | End: 2020-10-09 | Stop reason: ALTCHOICE

## 2020-09-30 RX ORDER — LIDOCAINE 50 MG/G
1 PATCH TOPICAL DAILY
Qty: 30 PATCH | Refills: 2 | Status: SHIPPED | OUTPATIENT
Start: 2020-09-30 | End: 2020-12-07 | Stop reason: SDUPTHER

## 2020-09-30 NOTE — PROGRESS NOTES
Subjective:       Patient ID: Mehul Randhawa is a 50 y.o. male.    Chief Complaint: Back Pain     Follow up for Back Pain (DOI 8/25/2017). Pain score today is 3/10 with complaints of pinching pain right above his buttocks. Patient states medication is working as well as the daily home exercises. IJ    Back Pain  This is a recurrent problem. The current episode started more than 1 month ago. The problem occurs daily. The problem has been gradually improving since onset. Quality: PINCHING. The pain does not radiate. The pain is at a severity of 3/10. The pain is mild. Pertinent negatives include no abdominal pain, bladder incontinence, bowel incontinence, dysuria or numbness.       Constitution: Negative for fatigue.   Gastrointestinal: Negative for abdominal pain and bowel incontinence.   Genitourinary: Negative for dysuria, urgency, bladder incontinence and hematuria.   Musculoskeletal: Positive for pain, back pain and muscle ache. Negative for joint pain, pain with walking, muscle cramps and history of spine disorder.   Skin: Negative for rash.   Neurological: Negative for coordination disturbances, numbness and tingling.        Objective:      Physical Exam  Vitals signs and nursing note reviewed.   Constitutional:       Appearance: Normal appearance. He is well-developed.   HENT:      Head: Normocephalic and atraumatic.      Right Ear: Tympanic membrane normal.      Left Ear: Tympanic membrane normal.   Eyes:      Pupils: Pupils are equal, round, and reactive to light.   Neck:      Musculoskeletal: Normal range of motion.   Cardiovascular:      Rate and Rhythm: Normal rate.   Pulmonary:      Effort: Pulmonary effort is normal.   Musculoskeletal: Normal range of motion.   Skin:     General: Skin is warm and dry.   Neurological:      Mental Status: He is alert and oriented to person, place, and time.         Assessment:       1. Strain of lumbar region, subsequent encounter    2. Chronic midline low back pain without  sciatica    3. Lumbar strain, subsequent encounter    4. 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  Ideally he should be transferred to a less physically demanding job going forward as this puts him at increased risk for recurrent back pain.    Medications Ordered This Encounter   Medications    lidocaine (LIDODERM) 5 %     Sig: Place 1 patch onto the skin once daily. Remove & Discard patch within 12 hours or as directed by MD. May substitute generic brand.     Dispense:  30 patch     Refill:  2    naproxen (NAPROSYN) 500 MG tablet     Sig: Take 1 tablet (500 mg total) by mouth 2 (two) times daily with meals.     Dispense:  60 tablet     Refill:  1     Patient Instructions: Daily home exercises/warm soaks   Restrictions: Regular Duty  Follow up in about 6 weeks (around 11/11/2020).

## 2020-10-09 ENCOUNTER — OFFICE VISIT (OUTPATIENT)
Dept: URGENT CARE | Facility: CLINIC | Age: 50
End: 2020-10-09
Payer: COMMERCIAL

## 2020-10-09 DIAGNOSIS — M54.9 DORSALGIA, UNSPECIFIED: ICD-10-CM

## 2020-10-09 DIAGNOSIS — S39.012D LUMBAR STRAIN, SUBSEQUENT ENCOUNTER: ICD-10-CM

## 2020-10-09 DIAGNOSIS — S39.012D STRAIN OF LUMBAR REGION, SUBSEQUENT ENCOUNTER: Primary | ICD-10-CM

## 2020-10-09 DIAGNOSIS — M54.50 CHRONIC MIDLINE LOW BACK PAIN WITHOUT SCIATICA: ICD-10-CM

## 2020-10-09 DIAGNOSIS — G89.29 ACUTE EXACERBATION OF CHRONIC LOW BACK PAIN: ICD-10-CM

## 2020-10-09 DIAGNOSIS — M54.50 ACUTE EXACERBATION OF CHRONIC LOW BACK PAIN: ICD-10-CM

## 2020-10-09 DIAGNOSIS — G89.29 CHRONIC MIDLINE LOW BACK PAIN WITHOUT SCIATICA: ICD-10-CM

## 2020-10-09 PROCEDURE — 96372 PR INJECTION,THERAP/PROPH/DIAG2ST, IM OR SUBCUT: ICD-10-PCS | Mod: S$GLB,,, | Performed by: PREVENTIVE MEDICINE

## 2020-10-09 PROCEDURE — 99214 OFFICE O/P EST MOD 30 MIN: CPT | Mod: 25,S$GLB,, | Performed by: PREVENTIVE MEDICINE

## 2020-10-09 PROCEDURE — 99214 PR OFFICE/OUTPT VISIT, EST, LEVL IV, 30-39 MIN: ICD-10-PCS | Mod: 25,S$GLB,, | Performed by: PREVENTIVE MEDICINE

## 2020-10-09 PROCEDURE — 96372 THER/PROPH/DIAG INJ SC/IM: CPT | Mod: S$GLB,,, | Performed by: PREVENTIVE MEDICINE

## 2020-10-09 RX ORDER — KETOROLAC TROMETHAMINE 30 MG/ML
60 INJECTION, SOLUTION INTRAMUSCULAR; INTRAVENOUS
Status: COMPLETED | OUTPATIENT
Start: 2020-10-09 | End: 2020-10-09

## 2020-10-09 RX ADMIN — KETOROLAC TROMETHAMINE 60 MG: 30 INJECTION, SOLUTION INTRAMUSCULAR; INTRAVENOUS at 10:10

## 2020-10-09 NOTE — LETTER
[facility] [place]  [address], [address]  [place] [place] [facility] [address]  Phone: [phone]  Fax: [phone]  [facility]: [id number]    Pt Name: Mehul Randhawa  Injury Date: 08/25/2017   Employee ID: [id number] Date of First Treatment: 10/09/2020   Company: [employer]      Appointment Time:  Arrived: 9:18 AM   Provider: Misael Hinojosa MD Time Out: 10:35 AM     Office Treatment:   1. Strain of lumbar region, subsequent encounter    2. Acute exacerbation of chronic low back pain    3. Chronic midline low back pain without sciatica    4. Lumbar strain, subsequent encounter    5. Dorsalgia, unspecified      Medications Ordered This Encounter   Medications    ketorolac injection 60 mg      Patient Instructions: Daily home exercises/warm soaks, MRI to be scheduled once authorized    Restrictions: Home today, No lifting/pushing/pulling more than 25 lbs(Patient to begin light duty on 10/12/2020)     Return Appointment: 10/19/2020 at 1:30 PM     MARCELL

## 2020-10-09 NOTE — PROGRESS NOTES
Subjective:       Patient ID: Mehul Randhawa is a 50 y.o. male.    Chief Complaint: Back Pain    WC Follow up for Back Pain (DOI 8/25/2017). Pain score today is 4/10 with complaints of pinching pain right above his buttocks. Patient states medication is working as well as the daily home exercises. He also complain of lower   abdomen pinching pain. IJ    Back Pain  This is a recurrent problem. The current episode started more than 1 month ago. The problem occurs daily. The problem has been gradually improving since onset. Quality: PINCHING. The pain does not radiate. The pain is at a severity of 4/10. The pain is mild. Pertinent negatives include no abdominal pain, bladder incontinence, bowel incontinence, dysuria or numbness.       Constitution: Negative for fatigue.   Gastrointestinal: Negative for abdominal pain and bowel incontinence.   Genitourinary: Negative for dysuria, urgency, bladder incontinence and hematuria.   Musculoskeletal: Positive for pain, back pain and muscle ache. Negative for joint pain, pain with walking, muscle cramps and history of spine disorder.   Skin: Negative for rash.   Neurological: Negative for coordination disturbances, numbness and tingling.        Objective:      Physical Exam  Vitals signs and nursing note reviewed.   Constitutional:       Appearance: Normal appearance. He is well-developed.   HENT:      Head: Normocephalic and atraumatic.      Nose: Nose normal.   Eyes:      Pupils: Pupils are equal, round, and reactive to light.   Neck:      Musculoskeletal: Normal range of motion.   Cardiovascular:      Rate and Rhythm: Normal rate and regular rhythm.   Pulmonary:      Effort: Pulmonary effort is normal.      Breath sounds: Normal breath sounds.   Musculoskeletal:      Lumbar back: He exhibits decreased range of motion, tenderness and pain. He exhibits no bony tenderness, no swelling, no edema, no deformity, no laceration, no spasm and normal pulse.        Back:       Comments:  Patient has pain with palpation across his lower back with spasm noted this morning.  He has limited range of motion with complaints of pain on forward flexion to 45°, extension to 10°, and lateral bending to 25°.  He has no motor or sensory deficits about his lower extremities   Skin:     General: Skin is warm and dry.   Neurological:      Mental Status: He is alert and oriented to person, place, and time.         Assessment:       1. Strain of lumbar region, subsequent encounter    2. Acute exacerbation of chronic low back pain    3. Chronic midline low back pain without sciatica    4. Lumbar strain, subsequent encounter    5. Dorsalgia, unspecified        Plan:     patient had an acute exacerbation of his low back pain while at work this morning.  He has had chronic low back pain which has been controlled with Naprosyn and pain patches.  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  He therefore will undergo an MRI of his low back at this time.  He will be sent home today and may resume light duty for 1 week until follow-up on 10/19/2020    Medications Ordered This Encounter   Medications    ketorolac injection 60 mg     Patient Instructions: Daily home exercises/warm soaks, MRI to be scheduled once authorized   Restrictions: Home today, No lifting/pushing/pulling more than 25 lbs(Patient to begin light duty on 10/12/2020)  Follow up in about 10 days (around 10/19/2020).

## 2020-10-21 ENCOUNTER — OFFICE VISIT (OUTPATIENT)
Dept: URGENT CARE | Facility: CLINIC | Age: 50
End: 2020-10-21
Payer: COMMERCIAL

## 2020-10-21 DIAGNOSIS — G89.29 ACUTE EXACERBATION OF CHRONIC LOW BACK PAIN: Primary | ICD-10-CM

## 2020-10-21 DIAGNOSIS — S39.012D LUMBAR STRAIN, SUBSEQUENT ENCOUNTER: ICD-10-CM

## 2020-10-21 DIAGNOSIS — M54.9 DORSALGIA, UNSPECIFIED: ICD-10-CM

## 2020-10-21 DIAGNOSIS — S39.012D STRAIN OF LUMBAR REGION, SUBSEQUENT ENCOUNTER: ICD-10-CM

## 2020-10-21 DIAGNOSIS — M54.50 CHRONIC MIDLINE LOW BACK PAIN WITHOUT SCIATICA: ICD-10-CM

## 2020-10-21 DIAGNOSIS — M54.50 ACUTE EXACERBATION OF CHRONIC LOW BACK PAIN: Primary | ICD-10-CM

## 2020-10-21 DIAGNOSIS — G89.29 CHRONIC MIDLINE LOW BACK PAIN WITHOUT SCIATICA: ICD-10-CM

## 2020-10-21 PROCEDURE — 99214 OFFICE O/P EST MOD 30 MIN: CPT | Mod: S$GLB,,, | Performed by: PREVENTIVE MEDICINE

## 2020-10-21 PROCEDURE — 99214 PR OFFICE/OUTPT VISIT, EST, LEVL IV, 30-39 MIN: ICD-10-PCS | Mod: S$GLB,,, | Performed by: PREVENTIVE MEDICINE

## 2020-10-21 NOTE — PROGRESS NOTES
Subjective:       Patient ID: Mehul Randhawa is a 50 y.o. male.    Chief Complaint: Back Pain    .Pt is here for a follow up WC visit Back DOI: 8/25/17 pt rates pain a 7/10 describes pain as spasms in lower back and right buttocks that radiates to the front hip  pt also complains of numbness and tingling in right leg as well. Pt is still taking medication and following restrictions..sb       Back Pain  This is a chronic problem. The current episode started more than 1 year ago. The problem occurs constantly. The problem is unchanged. The pain is present in the lumbar spine. The quality of the pain is described as burning and stabbing. The pain radiates to the right thigh. The pain is at a severity of 7/10. The pain is moderate. The pain is the same all the time. The symptoms are aggravated by bending, position, sitting, standing and twisting. Stiffness is present all day. Associated symptoms include numbness, paresthesias and tingling. Pertinent negatives include no abdominal pain, bladder incontinence, bowel incontinence, chest pain, dysuria, fever, headaches, leg pain, paresis, pelvic pain, perianal numbness, weakness or weight loss. He has tried heat and ice for the symptoms. The treatment provided no relief.       Constitution: Negative for fatigue and fever.   Cardiovascular: Negative for chest pain.   Gastrointestinal: Negative for abdominal pain and bowel incontinence.   Genitourinary: Negative for dysuria, urgency, bladder incontinence, hematuria and pelvic pain.   Musculoskeletal: Positive for pain, back pain, pain with walking and muscle cramps. Negative for history of spine disorder.   Skin: Negative for rash.   Neurological: Positive for numbness. Negative for coordination disturbances, headaches and tingling.        Objective:      Physical Exam  Vitals signs and nursing note reviewed.   Constitutional:       Appearance: Normal appearance. He is well-developed.   HENT:      Head: Normocephalic and  atraumatic.      Nose: Nose normal.   Eyes:      Pupils: Pupils are equal, round, and reactive to light.   Neck:      Musculoskeletal: Normal range of motion.   Cardiovascular:      Rate and Rhythm: Normal rate and regular rhythm.   Pulmonary:      Effort: Pulmonary effort is normal.      Breath sounds: Normal breath sounds.   Musculoskeletal:      Lumbar back: He exhibits decreased range of motion, tenderness and pain. He exhibits no bony tenderness, no swelling, no edema, no deformity, no laceration, no spasm and normal pulse.        Back:       Comments: Patient has pain with palpation across his lower back with spasm noted this morning.  He has limited range of motion with complaints of pain on forward flexion to 45°, extension to 10°, and lateral bending to 25°.  He has no motor or sensory deficits about his lower extremities   Skin:     General: Skin is warm and dry.   Neurological:      Mental Status: He is alert and oriented to person, place, and time.         Assessment:       1. Acute exacerbation of chronic low back pain    2. Strain of lumbar region, subsequent encounter    3. Chronic midline low back pain without sciatica    4. Lumbar strain, subsequent encounter    5. 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  Therefore he will be sent home at this time for 1 week to rest and focus on stretching exercises with warm soaks daily.  He will continue taking his Naprosyn and apply pain patch as needed.  An MRI of his lumbosacral spine has been previously ordered and will be scheduled shortly.       Patient Instructions: Daily home exercises/warm soaks   Restrictions: Disabled until next office visit  Follow up in about 1 week (around 10/28/2020).

## 2020-10-21 NOTE — LETTER
[facility] [place]  [address], [address]  [facility] [address]  Phone: [phone]  Fax: [phone]  [facility]: [id number]    Pt Name: Mehul Randhawa  Injury Date: 08/25/2017   Employee ID: [id number] Date of Treatment: 10/21/2020   Company: [employer]      Appointment Time: 12:30 PM Arrived: 12:28 PM   Provider: Misael Hinojosa MD Time Out: 1:10 PM     Office Treatment:   1. Acute exacerbation of chronic low back pain    2. Strain of lumbar region, subsequent encounter    3. Chronic midline low back pain without sciatica    4. Lumbar strain, subsequent encounter    5. Dorsalgia, unspecified          Patient Instructions: Daily home exercises/warm soaks    Restrictions: Disabled until next office visit     Return Appointment: 10/28/2020 at 4:00 PM       SH

## 2020-10-28 ENCOUNTER — OFFICE VISIT (OUTPATIENT)
Dept: URGENT CARE | Facility: CLINIC | Age: 50
End: 2020-10-28
Payer: COMMERCIAL

## 2020-10-28 DIAGNOSIS — S39.012D LUMBAR STRAIN, SUBSEQUENT ENCOUNTER: ICD-10-CM

## 2020-10-28 DIAGNOSIS — G89.29 CHRONIC MIDLINE LOW BACK PAIN WITHOUT SCIATICA: ICD-10-CM

## 2020-10-28 DIAGNOSIS — M54.50 CHRONIC MIDLINE LOW BACK PAIN WITHOUT SCIATICA: ICD-10-CM

## 2020-10-28 DIAGNOSIS — M54.50 ACUTE EXACERBATION OF CHRONIC LOW BACK PAIN: Primary | ICD-10-CM

## 2020-10-28 DIAGNOSIS — G89.29 ACUTE EXACERBATION OF CHRONIC LOW BACK PAIN: Primary | ICD-10-CM

## 2020-10-28 DIAGNOSIS — S39.012D STRAIN OF LUMBAR REGION, SUBSEQUENT ENCOUNTER: ICD-10-CM

## 2020-10-28 PROCEDURE — 99214 OFFICE O/P EST MOD 30 MIN: CPT | Mod: S$GLB,,, | Performed by: PREVENTIVE MEDICINE

## 2020-10-28 PROCEDURE — 99214 PR OFFICE/OUTPT VISIT, EST, LEVL IV, 30-39 MIN: ICD-10-PCS | Mod: S$GLB,,, | Performed by: PREVENTIVE MEDICINE

## 2020-10-28 NOTE — LETTER
[facility] [place]  [address], [address]  [facility] [address]  Phone: [phone]  Fax: [phone]  [facility]: [id number]    Pt Name: Mehul Randhawa  Injury Date: 08/25/2017   Employee ID: [id number] Date of  Treatment: 10/28/2020   Company: [employer]      Appointment Time: 03:45 PM Arrived: 11:15 am   Provider: Misael Hinojosa MD Time Out: 12:15 pm     Office Treatment:   1. Acute exacerbation of chronic low back pain    2. Strain of lumbar region, subsequent encounter    3. Chronic midline low back pain without sciatica    4. Lumbar strain, subsequent encounter          Patient Instructions: Daily home exercises/warm soaks    Restrictions: Regular Duty(Attention not aggravate low back pain at work)     Return Appointment: 11/18/2020 at 10:00 AM  MARTINEZ

## 2020-10-28 NOTE — PROGRESS NOTES
Subjective:       Patient ID: Mehul Randhawa is a 50 y.o. male.    Chief Complaint: Back Pain     Follow-up of Back Pain ( DOI 08- ) Pain score 2/10 with complaints of Intermittent Stabbing/Aching pain that radiates down RT Leg, Stiffness, Numbness/Tingling of RT Leg, Pain with Prolonged Walking. Taking Naprosyn, using Pain Patch, doing Daily Home exercises w/wm soaks. SH    Back Pain  Associated symptoms include numbness. Pertinent negatives include no abdominal pain, bladder incontinence, bowel incontinence or dysuria.       Constitution: Negative for fatigue.   Gastrointestinal: Negative for abdominal pain and bowel incontinence.   Genitourinary: Negative for dysuria, urgency, bladder incontinence and hematuria.   Musculoskeletal: Positive for joint pain, abnormal ROM of joint, back pain, pain with walking and muscle ache. Negative for muscle cramps and history of spine disorder.   Skin: Negative for rash.   Neurological: Positive for numbness and tingling. Negative for coordination disturbances.        Objective:      Physical Exam  Vitals signs and nursing note reviewed.   Constitutional:       Appearance: Normal appearance. He is well-developed.   HENT:      Head: Normocephalic and atraumatic.      Nose: Nose normal.   Eyes:      Pupils: Pupils are equal, round, and reactive to light.   Neck:      Musculoskeletal: Normal range of motion.   Cardiovascular:      Rate and Rhythm: Normal rate and regular rhythm.   Pulmonary:      Effort: Pulmonary effort is normal.      Breath sounds: Normal breath sounds.   Musculoskeletal:      Lumbar back: He exhibits decreased range of motion, tenderness and pain. He exhibits no bony tenderness, no swelling, no edema, no deformity, no laceration, no spasm and normal pulse.        Back:       Comments: Patient has pain with palpation across his lower back with spasm noted this morning.  He has limited range of motion with complaints of pain on forward flexion to 45°,  extension to 10°, and lateral bending to 25°.  He has no motor or sensory deficits about his lower extremities   Skin:     General: Skin is warm and dry.   Neurological:      Mental Status: He is alert and oriented to person, place, and time.         Assessment:       1. Acute exacerbation of chronic low back pain    2. Strain of lumbar region, subsequent encounter    3. Chronic midline low back pain without sciatica    4. Lumbar strain, subsequent encounter        Plan:       Discussed with patient the results of his most recent MRI of the lumbosacral spine which revealed multilevel degenerative changes of both the disc and spine without evidence of herniation or nerve root compression.  Both the neural foramen and spinal canal appear intact without evidence of stenosis.  No fractures or other bony abnormalities were noted.  Patient expressed understanding of these results and will continue his present work duties with attention not aggravate injury.  He will continue taking Naprosyn 500 mg and apply pain patch as needed.     Patient Instructions: Daily home exercises/warm soaks   Restrictions: Regular Duty(Attention not aggravate low back pain at work)  Follow up in about 3 weeks (around 11/18/2020).

## 2020-11-05 ENCOUNTER — OFFICE VISIT (OUTPATIENT)
Dept: URGENT CARE | Facility: CLINIC | Age: 50
End: 2020-11-05
Payer: COMMERCIAL

## 2020-11-05 DIAGNOSIS — G89.29 ACUTE EXACERBATION OF CHRONIC LOW BACK PAIN: Primary | ICD-10-CM

## 2020-11-05 DIAGNOSIS — S39.012D STRAIN OF LUMBAR REGION, SUBSEQUENT ENCOUNTER: ICD-10-CM

## 2020-11-05 DIAGNOSIS — M54.50 ACUTE EXACERBATION OF CHRONIC LOW BACK PAIN: Primary | ICD-10-CM
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
## 2020-11-05 PROCEDURE — 99214 PR OFFICE/OUTPT VISIT, EST, LEVL IV, 30-39 MIN: ICD-10-PCS | Mod: S$GLB,,, | Performed by: PHYSICIAN ASSISTANT

## 2020-11-05 PROCEDURE — 99214 OFFICE O/P EST MOD 30 MIN: CPT | Mod: S$GLB,,, | Performed by: PHYSICIAN ASSISTANT

## 2020-11-05 RX ORDER — TIZANIDINE 4 MG/1
4 TABLET ORAL 2 TIMES DAILY PRN
Qty: 20 TABLET | Refills: 0 | Status: SHIPPED | OUTPATIENT
Start: 2020-11-05 | End: 2020-12-07 | Stop reason: SDUPTHER

## 2020-11-05 NOTE — PROGRESS NOTES
Subjective:       Patient ID: Mehul Randhawa is a 50 y.o. male.    Chief Complaint: Back Pain    Pt is here for a follow up WC visit Back  DOI: 8/25/17 rates pain 5/10 and describe it as aching stabbing pain. Pt states he having spasms in left and right buttocks that radiates to the left and right leg also experncing numbness in both legs. Aggravated by twisting and lifting more than 20 lbs pt is still following restrictons ..sb     Back Pain  This is a chronic problem. The current episode started more than 1 year ago. The problem occurs constantly. The problem has been gradually worsening since onset. The pain is present in the lumbar spine. The quality of the pain is described as aching and stabbing. The pain radiates to the right thigh and left thigh. The pain is at a severity of 5/10. The pain is moderate. The pain is the same all the time. The symptoms are aggravated by twisting, standing, stress and bending. Stiffness is present all day. Associated symptoms include leg pain and numbness. Pertinent negatives include no abdominal pain, bladder incontinence, bowel incontinence, chest pain, dysuria, fever, headaches, paresis, paresthesias, pelvic pain, perianal numbness, tingling, weakness or weight loss. He has tried bed rest and NSAIDs for the symptoms. The treatment provided mild relief.       Constitution: Negative for fatigue and fever.   Cardiovascular: Negative for chest pain.   Gastrointestinal: Negative for abdominal pain and bowel incontinence.   Genitourinary: Negative for dysuria, urgency, bladder incontinence, hematuria and pelvic pain.   Musculoskeletal: Positive for pain, back pain, muscle cramps and muscle ache. Negative for history of spine disorder.   Skin: Negative for rash.   Neurological: Positive for numbness. Negative for coordination disturbances, headaches and tingling.        Objective:      Physical Exam  Nursing note reviewed.   Constitutional:       General: He is not in acute distress.      Appearance: Normal appearance. He is well-developed.   HENT:      Head: Normocephalic and atraumatic.      Right Ear: Hearing and external ear normal.      Left Ear: Hearing and external ear normal.      Nose: Nose normal. No nasal deformity.   Eyes:      General: Lids are normal.      Conjunctiva/sclera: Conjunctivae normal.      Right eye: Right conjunctiva is not injected.      Left eye: Left conjunctiva is not injected.   Neck:      Musculoskeletal: Normal range of motion. No spinous process tenderness or muscular tenderness.      Trachea: Trachea normal.   Cardiovascular:      Pulses: Normal pulses.           Dorsalis pedis pulses are 2+ on the right side and 2+ on the left side.        Posterior tibial pulses are 2+ on the right side and 2+ on the left side.   Pulmonary:      Effort: Pulmonary effort is normal. No respiratory distress.      Breath sounds: No stridor.   Musculoskeletal:      Cervical back: Normal.      Thoracic back: Normal.      Lumbar back: He exhibits decreased range of motion, tenderness, pain and spasm. He exhibits no deformity and normal pulse.        Back:    Skin:     General: Skin is warm and dry.      Findings: No abrasion or bruising.   Neurological:      Mental Status: He is alert.      GCS: GCS eye subscore is 4. GCS verbal subscore is 5. GCS motor subscore is 6.      Sensory: No sensory deficit.      Deep Tendon Reflexes: Reflexes are normal and symmetric.      Reflex Scores:       Patellar reflexes are 2+ on the right side and 2+ on the left side.       Achilles reflexes are 2+ on the right side and 2+ on the left side.     Comments: SLR negative bilaterally.    Psychiatric:         Attention and Perception: He is attentive.         Speech: Speech normal.         Behavior: Behavior normal.         Thought Content: Thought content normal.         Assessment:       1. Acute exacerbation of chronic low back pain    2. Strain of lumbar region, subsequent encounter    3. Work related  injury        Plan:         Medications Ordered This Encounter   Medications    tiZANidine (ZANAFLEX) 4 MG tablet     Sig: Take 1 tablet (4 mg total) by mouth 2 (two) times daily as needed (muscle spasms). Take off duty only. May cause drowsiness.     Dispense:  20 tablet     Refill:  0     Patient Instructions: Daily home exercises/warm soaks   Restrictions: Disabled until next office visit  Follow up in about 4 days (around 11/9/2020) for Dr. Hinojosa.

## 2020-11-05 NOTE — LETTER
[facility] [place]  [address], [address]  [facility] [place] [facility] [address]  Phone: [phone]  Fax: [phone]  [facility]: [id number]    Pt Name: Mehul Randhawa  Injury Date: 08/25/2017   Employee ID: [id number] Date of First Treatment: 11/05/2020   Company: [employer]      Appointment Time:  Arrived: 11:10 AM   Provider: Arnoldo Alberts PA-C Time Out: 12:17 PM     Office Treatment:   1. Acute exacerbation of chronic low back pain    2. Strain of lumbar region, subsequent encounter    3. Work related injury      Medications Ordered This Encounter   Medications    tiZANidine (ZANAFLEX) 4 MG tablet      Patient Instructions: Daily home exercises/warm soaks    Restrictions: Disabled until next office visit     Return Appointment: 11/9/2020 at 9:15 AM     KD

## 2020-11-09 ENCOUNTER — OFFICE VISIT (OUTPATIENT)
Dept: URGENT CARE | Facility: CLINIC | Age: 50
End: 2020-11-09
Payer: COMMERCIAL

## 2020-11-09 DIAGNOSIS — S39.012D LUMBAR STRAIN, SUBSEQUENT ENCOUNTER: ICD-10-CM

## 2020-11-09 DIAGNOSIS — S39.012D STRAIN OF LUMBAR REGION, SUBSEQUENT ENCOUNTER: ICD-10-CM

## 2020-11-09 DIAGNOSIS — M47.816 OSTEOARTHRITIS OF LUMBAR SPINE, UNSPECIFIED SPINAL OSTEOARTHRITIS COMPLICATION STATUS: ICD-10-CM

## 2020-11-09 DIAGNOSIS — G89.29 ACUTE EXACERBATION OF CHRONIC LOW BACK PAIN: Primary | ICD-10-CM

## 2020-11-09 DIAGNOSIS — M54.9 DORSALGIA, UNSPECIFIED: ICD-10-CM

## 2020-11-09 DIAGNOSIS — M54.50 ACUTE EXACERBATION OF CHRONIC LOW BACK PAIN: Primary | ICD-10-CM

## 2020-11-09 DIAGNOSIS — G89.29 CHRONIC MIDLINE LOW BACK PAIN WITHOUT SCIATICA: ICD-10-CM

## 2020-11-09 DIAGNOSIS — M54.50 CHRONIC MIDLINE LOW BACK PAIN WITHOUT SCIATICA: ICD-10-CM

## 2020-11-09 PROCEDURE — 99214 PR OFFICE/OUTPT VISIT, EST, LEVL IV, 30-39 MIN: ICD-10-PCS | Mod: S$GLB,,, | Performed by: PREVENTIVE MEDICINE

## 2020-11-09 PROCEDURE — 99214 OFFICE O/P EST MOD 30 MIN: CPT | Mod: S$GLB,,, | Performed by: PREVENTIVE MEDICINE

## 2020-11-09 NOTE — PROGRESS NOTES
Subjective:       Patient ID: Mehul Randhawa is a 50 y.o. male.    Chief Complaint: Back Pain    Pt is being seen today for a follow up for a back injury from 8/25/2017.  He continues to have back pain that radiates down both legs with numbness.  Pt states that he is still experiencing muscle spasms in both buttocks.  He has a new complaint of have pain in his left foot.  His pain level for his back is 4/10 and his pain level for his left foot is 7/20.  KD       Constitution: Negative for fatigue.   Gastrointestinal: Negative for abdominal pain and bowel incontinence.   Genitourinary: Negative for dysuria, urgency, bladder incontinence and hematuria.   Musculoskeletal: Positive for pain, joint pain, back pain, pain with walking and muscle cramps. Negative for history of spine disorder.   Skin: Negative for rash.   Neurological: Negative for coordination disturbances, numbness and tingling.        Objective:      Physical Exam  Vitals signs and nursing note reviewed.   Constitutional:       Appearance: Normal appearance. He is well-developed.   HENT:      Head: Normocephalic and atraumatic.      Nose: Nose normal.   Eyes:      Pupils: Pupils are equal, round, and reactive to light.   Neck:      Musculoskeletal: Normal range of motion.   Cardiovascular:      Rate and Rhythm: Normal rate and regular rhythm.   Pulmonary:      Effort: Pulmonary effort is normal.      Breath sounds: Normal breath sounds.   Musculoskeletal:      Lumbar back: He exhibits decreased range of motion, tenderness and pain. He exhibits no bony tenderness, no swelling, no edema, no deformity, no laceration, no spasm and normal pulse.        Back:       Comments: Patient has pain with palpation across his lower back with spasm noted this morning.  He has limited range of motion with complaints of pain on forward flexion to 45°, extension to 10°, and lateral bending to 25°.  He has no motor or sensory deficits about his lower extremities   Skin:      General: Skin is warm and dry.   Neurological:      Mental Status: He is alert and oriented to person, place, and time.         Assessment:       1. Acute exacerbation of chronic low back pain    2. Strain of lumbar region, subsequent encounter    3. Chronic midline low back pain without sciatica    4. Lumbar strain, subsequent encounter    5. Dorsalgia, unspecified    6. Osteoarthritis of lumbar spine, unspecified spinal osteoarthritis complication status        Plan:       Due to patient's continued significant pain with range of motion testing he will remain disabled from work until his next office visit in 4 days.  He will continue taking Naprosyn 500 mg, tizanidine at night and apply a pain patch as needed.  He has been continuing with his exercises demonstrated in the office for his low back pain.     Patient Instructions: Daily home exercises/warm soaks   Restrictions: Disabled until next office visit  Follow up in about 4 days (around 11/13/2020).

## 2020-11-09 NOTE — LETTER
[facility] [place]  [address], [address]  [facility] [address]  Phone: [phone]  Fax: [phone]  [facility]: [id number]    Pt Name: Mehul Randhawa  Injury Date: 08/25/2017   Employee ID: [id number] Date of Treatment: 11/09/2020   Company: [employer]      Appointment Time: 09:15 AM Arrived:  9:18 AM   Provider: Misael Hinojosa MD Time Out: 10:18 AM     Office Treatment:   1. Acute exacerbation of chronic low back pain    2. Strain of lumbar region, subsequent encounter    3. Chronic midline low back pain without sciatica    4. Lumbar strain, subsequent encounter    5. Dorsalgia, unspecified    6. Osteoarthritis of lumbar spine, unspecified spinal osteoarthritis complication status          Patient Instructions: Daily home exercises/warm soaks    Restrictions: Disabled until next office visit     Return Appointment: 11/13/2020 at 12:30 PM       KD

## 2020-11-09 NOTE — LETTER
[facility] [place]  [address], [address]  [facility] [address]  Phone: [phone]  Fax: [phone]  [facility]: [id number]    Pt Name: Mehul Randhawa  Injury Date: 08/25/2017   Employee ID: [id number] Date of Treatment: 11/09/2020   Company: [employer]      Appointment Time: 09:15 AM Arrived: 9:18 AM   Provider: Misael Hinojosa MD Time Out: 10:12 AM     Office Treatment:   1. Acute exacerbation of chronic low back pain    2. Strain of lumbar region, subsequent encounter    3. Chronic midline low back pain without sciatica    4. Lumbar strain, subsequent encounter    5. Dorsalgia, unspecified    6. Osteoarthritis of lumbar spine, unspecified spinal osteoarthritis complication status          Patient Instructions: Daily home exercises/warm soaks    Restrictions: Disabled until next office visit     Return Appointment: 11/11/2020 at 12:30 PM       KD

## 2020-11-09 NOTE — LETTER
[facility] [place]  [address], [address]  [facility] [address]  Phone: [phone]  Fax: [phone]  [facility]: [id number]    Pt Name: Mehul Randhawa  Injury Date: 08/25/2017   Employee ID: [id number] Date of Treatment: 11/09/2020   Company: [employer]      Appointment Time: 09:15 AM Arrived: 9:18 AM   Provider: Misael Hinojosa MD Time Out: 10:20 AM     Office Treatment:   1. Acute exacerbation of chronic low back pain    2. Strain of lumbar region, subsequent encounter    3. Chronic midline low back pain without sciatica    4. Lumbar strain, subsequent encounter    5. Dorsalgia, unspecified    6. Osteoarthritis of lumbar spine, unspecified spinal osteoarthritis complication status          Patient Instructions: Daily home exercises/warm soaks    Restrictions: Disabled until next office visit     Return Appointment: 11/13/2020 at 12:30 PM    KD

## 2020-11-13 ENCOUNTER — OFFICE VISIT (OUTPATIENT)
Dept: URGENT CARE | Facility: CLINIC | Age: 50
End: 2020-11-13
Payer: COMMERCIAL

## 2020-11-13 DIAGNOSIS — G89.29 ACUTE EXACERBATION OF CHRONIC LOW BACK PAIN: ICD-10-CM

## 2020-11-13 DIAGNOSIS — M54.50 CHRONIC MIDLINE LOW BACK PAIN WITHOUT SCIATICA: Primary | ICD-10-CM

## 2020-11-13 DIAGNOSIS — M54.50 ACUTE EXACERBATION OF CHRONIC LOW BACK PAIN: ICD-10-CM

## 2020-11-13 DIAGNOSIS — G89.29 CHRONIC MIDLINE LOW BACK PAIN WITHOUT SCIATICA: Primary | ICD-10-CM

## 2020-11-13 DIAGNOSIS — M54.9 DORSALGIA, UNSPECIFIED: ICD-10-CM

## 2020-11-13 DIAGNOSIS — M47.816 OSTEOARTHRITIS OF LUMBAR SPINE, UNSPECIFIED SPINAL OSTEOARTHRITIS COMPLICATION STATUS: ICD-10-CM

## 2020-11-13 DIAGNOSIS — S39.012D STRAIN OF LUMBAR REGION, SUBSEQUENT ENCOUNTER: ICD-10-CM

## 2020-11-13 DIAGNOSIS — S39.012D LUMBAR STRAIN, SUBSEQUENT ENCOUNTER: ICD-10-CM

## 2020-11-13 PROCEDURE — 99214 OFFICE O/P EST MOD 30 MIN: CPT | Mod: S$GLB,,, | Performed by: PREVENTIVE MEDICINE

## 2020-11-13 PROCEDURE — 99214 PR OFFICE/OUTPT VISIT, EST, LEVL IV, 30-39 MIN: ICD-10-PCS | Mod: S$GLB,,, | Performed by: PREVENTIVE MEDICINE

## 2020-11-13 NOTE — PROGRESS NOTES
Subjective:       Patient ID: Mehul Randhawa is a 50 y.o. male.    Chief Complaint: Back Pain    .Pt is here for a follow up WC visit Back DOI: 8/25/17 pt rates pain a 3/10 describes pain as spasms in lower back  Pt is still taking medication and following restrictions warm soaks and walking..sb     Back Pain  This is a chronic problem. The current episode started more than 1 year ago. The problem occurs constantly. The problem has been gradually improving since onset. The pain is present in the lumbar spine. The quality of the pain is described as aching. The pain radiates to the left thigh. The pain is at a severity of 3/10. The pain is mild. The pain is the same all the time. The symptoms are aggravated by sitting, bending and stress. Stiffness is present all day. Pertinent negatives include no abdominal pain, bladder incontinence, bowel incontinence, chest pain, dysuria, fever, headaches, leg pain, numbness, paresis, paresthesias, pelvic pain, perianal numbness, tingling, weakness or weight loss. He has tried muscle relaxant, home exercises, heat and walking for the symptoms. The treatment provided mild relief.       Constitution: Negative for fatigue and fever.   Cardiovascular: Negative for chest pain.   Gastrointestinal: Negative for abdominal pain and bowel incontinence.   Genitourinary: Negative for dysuria, urgency, bladder incontinence, hematuria and pelvic pain.   Musculoskeletal: Positive for back pain. Negative for muscle cramps and history of spine disorder.   Skin: Negative for rash.   Neurological: Negative for coordination disturbances, headaches, numbness and tingling.        Objective:      Physical Exam  Musculoskeletal:      Lumbar back: He exhibits decreased range of motion, tenderness and pain. He exhibits no bony tenderness, no swelling, no edema, no deformity, no laceration, no spasm and normal pulse.        Back:       Comments: Less pain noted with both palpation and range of motion  examination of patient's lower back.  Patient has had no swelling or spasm across his lower back but continues with pain on extremes of flexion to 90°, extension to 25°, and lateral bending to 25°.  He has no or sensory deficits about his lower extremities.  Distal pulses are equal intact in his lower extremities.         Assessment:       1. Chronic midline low back pain without sciatica    2. Osteoarthritis of lumbar spine, unspecified spinal osteoarthritis complication status    3. Acute exacerbation of chronic low back pain    4. Strain of lumbar region, subsequent encounter    5. Lumbar strain, subsequent encounter    6. Dorsalgia, unspecified        Plan:     as patient is somewhat improved with his range of motion and less pain about his lower back he will attempt a return to work to modified duty starting 11/16/2020.  He will continue taking Naprosyn 500 mg twice a day with food and tizanidine 4mg primarily at night.  He will also apply pain patch to his lower back as needed.       Patient Instructions: Daily home exercises/warm soaks   Restrictions: No lifting/pushing/pulling more than 10 lbs, Avoid frequent bending/lifting/twisting, No above the shoulder/overhead work(No mopping or sweeping at work.  Patient may resume modified work on 11/16/2020)  Follow up in about 1 week (around 11/20/2020).

## 2020-11-13 NOTE — LETTER
[facility] [place]  [address], [address]  [facility] [address]  Phone: [phone]  Fax: [phone]  [facility]: [id number]    Pt Name: Mehul Randhawa  Injury Date: 08/25/2017   Employee ID: [id number] Date of First Treatment: 11/13/2020   Company: [employer]      Appointment Time: 12:30 PM Arrived: 12:32pm   Provider: Misael Hinojosa MD Time Out: 1:18pm     Office Treatment:   1. Chronic midline low back pain without sciatica    2. Osteoarthritis of lumbar spine, unspecified spinal osteoarthritis complication status    3. Acute exacerbation of chronic low back pain    4. Strain of lumbar region, subsequent encounter    5. Lumbar strain, subsequent encounter    6. Dorsalgia, unspecified          Patient Instructions: Daily home exercises/warm soaks    Restrictions: No lifting/pushing/pulling more than 10 lbs, Avoid frequent bending/lifting/twisting, No above the shoulder/overhead work(No mopping or sweeping at work.  Patient may resume modified work on 11/16/2020)     Return Appointment: 11/18/2020 at 10am  Sb

## 2020-11-20 ENCOUNTER — OFFICE VISIT (OUTPATIENT)
Dept: URGENT CARE | Facility: CLINIC | Age: 50
End: 2020-11-20
Payer: COMMERCIAL

## 2020-11-20 DIAGNOSIS — S39.012D STRAIN OF LUMBAR REGION, SUBSEQUENT ENCOUNTER: ICD-10-CM

## 2020-11-20 DIAGNOSIS — M54.9 DORSALGIA, UNSPECIFIED: ICD-10-CM

## 2020-11-20 DIAGNOSIS — G89.29 ACUTE EXACERBATION OF CHRONIC LOW BACK PAIN: ICD-10-CM

## 2020-11-20 DIAGNOSIS — M54.50 CHRONIC MIDLINE LOW BACK PAIN WITHOUT SCIATICA: Primary | ICD-10-CM

## 2020-11-20 DIAGNOSIS — G89.29 CHRONIC MIDLINE LOW BACK PAIN WITHOUT SCIATICA: Primary | ICD-10-CM

## 2020-11-20 DIAGNOSIS — M54.50 ACUTE EXACERBATION OF CHRONIC LOW BACK PAIN: ICD-10-CM

## 2020-11-20 DIAGNOSIS — S39.012D LUMBAR STRAIN, SUBSEQUENT ENCOUNTER: ICD-10-CM

## 2020-11-20 DIAGNOSIS — M47.816 OSTEOARTHRITIS OF LUMBAR SPINE, UNSPECIFIED SPINAL OSTEOARTHRITIS COMPLICATION STATUS: ICD-10-CM

## 2020-11-20 PROCEDURE — 99214 OFFICE O/P EST MOD 30 MIN: CPT | Mod: S$GLB,,, | Performed by: PREVENTIVE MEDICINE

## 2020-11-20 PROCEDURE — 99214 PR OFFICE/OUTPT VISIT, EST, LEVL IV, 30-39 MIN: ICD-10-PCS | Mod: S$GLB,,, | Performed by: PREVENTIVE MEDICINE

## 2020-11-20 NOTE — LETTER
[facility] [place]  [address], [address]  [facility] [address]  Phone: [phone]  Fax: [phone]  [facility]: [id number]    Pt Name: Mehul Randhawa  Injury Date: 08/25/2017   Employee ID: [id number] Date of Treatment: 11/20/2020   Company: [employer]      Appointment Time: 1:00 PM Arrived: 1:01 PM   Provider: Misael Hinojosa MD Time Out: 2:10 PM     Office Treatment:   1. Chronic midline low back pain without sciatica    2. Osteoarthritis of lumbar spine, unspecified spinal osteoarthritis complication status    3. Acute exacerbation of chronic low back pain    4. Strain of lumbar region, subsequent encounter    5. Lumbar strain, subsequent encounter    6. Dorsalgia, unspecified          Patient Instructions: Daily home exercises/warm soaks(No mopping or sweeping at work)    Restrictions: Avoid frequent bending/lifting/twisting, No lifting/pushing/pulling more than 10 lbs, No above the shoulder/overhead work     Return Appointment: 12/7/2020 at 1:00 PM       MARCELL

## 2020-11-20 NOTE — PROGRESS NOTES
Subjective:       Patient ID: Mehul Randhawa is a 50 y.o. male.    Chief Complaint: Back Pain    Pt is being seen today for a follow up for a Back injury from 8/25/2020.  Continues to have back pain with radiating pain in both buttocks and going down both legs.  He feels stabbing/tingling/fatique pain down his left leg.  The tingling in his left foot has resolved.  His pain level is 4/10.  Continues to take Naprosyn and Zanaflex with moderate relief.  KD      Constitution: Negative for fatigue.   Gastrointestinal: Negative for abdominal pain and bowel incontinence.   Genitourinary: Negative for dysuria, urgency, bladder incontinence and hematuria.   Musculoskeletal: Positive for pain, joint pain and back pain. Negative for muscle cramps and history of spine disorder.   Skin: Negative for rash.   Neurological: Negative for coordination disturbances, numbness and tingling.        Objective:      Physical Exam  Vitals signs and nursing note reviewed.   Constitutional:       Appearance: Normal appearance. He is well-developed.   HENT:      Head: Normocephalic and atraumatic.      Nose: Nose normal.   Eyes:      Pupils: Pupils are equal, round, and reactive to light.   Neck:      Musculoskeletal: Normal range of motion.   Cardiovascular:      Rate and Rhythm: Normal rate and regular rhythm.   Pulmonary:      Effort: Pulmonary effort is normal.      Breath sounds: Normal breath sounds.   Musculoskeletal:      Lumbar back: He exhibits decreased range of motion, tenderness and pain. He exhibits no bony tenderness, no swelling, no edema, no deformity, no laceration, no spasm and normal pulse.        Back:       Comments: Persistent but less  pain with palpation across his lower back with spasm noted this morning.  He has limited range of motion with complaints of pain on forward flexion to 45°, extension to 10°, and lateral bending to 25°.  He has no motor or sensory deficits about his lower extremities   Skin:     General: Skin  is warm and dry.   Neurological:      Mental Status: He is alert and oriented to person, place, and time.         Assessment:       1. Chronic midline low back pain without sciatica    2. Osteoarthritis of lumbar spine, unspecified spinal osteoarthritis complication status    3. Acute exacerbation of chronic low back pain    4. Strain of lumbar region, subsequent encounter    5. Lumbar strain, subsequent encounter    6. Dorsalgia, unspecified        Plan:     patient feels somewhat improved with time, rest in maintaining light duty for the past week.  He will continue taking Naprosyn 500 mg twice a day and tizanidine 4 mg at night as needed for his back pain.       Patient Instructions: Daily home exercises/warm soaks(No mopping or sweeping at work)   Restrictions: Avoid frequent bending/lifting/twisting, No lifting/pushing/pulling more than 10 lbs, No above the shoulder/overhead work  Follow up in about 2 weeks (around 12/4/2020).

## 2020-12-07 ENCOUNTER — OFFICE VISIT (OUTPATIENT)
Dept: URGENT CARE | Facility: CLINIC | Age: 50
End: 2020-12-07
Payer: COMMERCIAL

## 2020-12-07 DIAGNOSIS — S39.012D STRAIN OF LUMBAR REGION, SUBSEQUENT ENCOUNTER: ICD-10-CM

## 2020-12-07 DIAGNOSIS — G89.29 CHRONIC MIDLINE LOW BACK PAIN WITHOUT SCIATICA: Primary | ICD-10-CM

## 2020-12-07 DIAGNOSIS — S39.012D LUMBAR STRAIN, SUBSEQUENT ENCOUNTER: ICD-10-CM

## 2020-12-07 DIAGNOSIS — G89.29 ACUTE EXACERBATION OF CHRONIC LOW BACK PAIN: ICD-10-CM

## 2020-12-07 DIAGNOSIS — M47.816 OSTEOARTHRITIS OF LUMBAR SPINE, UNSPECIFIED SPINAL OSTEOARTHRITIS COMPLICATION STATUS: ICD-10-CM

## 2020-12-07 DIAGNOSIS — M54.50 CHRONIC MIDLINE LOW BACK PAIN WITHOUT SCIATICA: Primary | ICD-10-CM

## 2020-12-07 DIAGNOSIS — M54.50 ACUTE EXACERBATION OF CHRONIC LOW BACK PAIN: ICD-10-CM

## 2020-12-07 PROCEDURE — 99214 PR OFFICE/OUTPT VISIT, EST, LEVL IV, 30-39 MIN: ICD-10-PCS | Mod: S$GLB,,, | Performed by: PREVENTIVE MEDICINE

## 2020-12-07 PROCEDURE — 99214 OFFICE O/P EST MOD 30 MIN: CPT | Mod: S$GLB,,, | Performed by: PREVENTIVE MEDICINE

## 2020-12-07 RX ORDER — LIDOCAINE 50 MG/G
1 PATCH TOPICAL DAILY
Qty: 30 PATCH | Refills: 2 | Status: SHIPPED | OUTPATIENT
Start: 2020-12-07 | End: 2021-01-11 | Stop reason: SDUPTHER

## 2020-12-07 RX ORDER — TIZANIDINE 4 MG/1
4 TABLET ORAL 2 TIMES DAILY PRN
Qty: 20 TABLET | Refills: 1 | Status: SHIPPED | OUTPATIENT
Start: 2020-12-07 | End: 2021-01-11 | Stop reason: SDUPTHER

## 2020-12-07 RX ORDER — NAPROXEN 500 MG/1
500 TABLET ORAL 2 TIMES DAILY WITH MEALS
Qty: 60 TABLET | Refills: 1 | Status: SHIPPED | OUTPATIENT
Start: 2020-12-07 | End: 2021-01-11 | Stop reason: SDUPTHER

## 2020-12-07 NOTE — PROGRESS NOTES
Subjective:       Patient ID: Mehul Randhawa is a 50 y.o. male.    Chief Complaint: Back Pain     Follow-up of Back Pain ( DOI 08- ) Pain score 3/10 with complaints of Intermittent Stabbing pain when bending over to  something, Intermittent Spasms but doesn't last as long as before, Stiffness. Taking Naprosyn, finished ZanaFlex, still using the Patch. SH    Back Pain  Pertinent negatives include no abdominal pain, bladder incontinence, bowel incontinence, dysuria or numbness.       Constitution: Negative for fatigue.   Gastrointestinal: Negative for abdominal pain and bowel incontinence.   Genitourinary: Negative for dysuria, urgency, bladder incontinence and hematuria.   Musculoskeletal: Positive for joint pain, back pain and muscle cramps. Negative for history of spine disorder.   Skin: Negative for rash.   Neurological: Negative for coordination disturbances, numbness and tingling.        Objective:      Physical Exam  Vitals signs and nursing note reviewed.   Constitutional:       Appearance: Normal appearance. He is well-developed.   HENT:      Head: Normocephalic and atraumatic.      Nose: Nose normal.   Eyes:      Pupils: Pupils are equal, round, and reactive to light.   Neck:      Musculoskeletal: Normal range of motion.   Cardiovascular:      Rate and Rhythm: Normal rate and regular rhythm.   Pulmonary:      Effort: Pulmonary effort is normal.      Breath sounds: Normal breath sounds.   Musculoskeletal:      Lumbar back: He exhibits decreased range of motion, tenderness and pain. He exhibits no bony tenderness, no swelling, no edema, no deformity, no laceration, no spasm and normal pulse.        Back:       Comments: Persistent but less  pain with palpation across his lower back with spasm noted this morning.  He has limited range of motion with complaints of pain on forward flexion to 45°, extension to 10°, and lateral bending to 25°.  He has no motor or sensory deficits about his lower  extremities   Skin:     General: Skin is warm and dry.   Neurological:      Mental Status: He is alert and oriented to person, place, and time.         Assessment:       1. Chronic midline low back pain without sciatica    2. Osteoarthritis of lumbar spine, unspecified spinal osteoarthritis complication status    3. Acute exacerbation of chronic low back pain    4. Strain of lumbar region, subsequent encounter    5. Lumbar strain, subsequent encounter        Plan:     this patient will continue his exercises that he has been given in physical therapy for his low back pain.  He will continue his work restrictions and take the medication listed below.    Medications Ordered This Encounter   Medications    lidocaine (LIDODERM) 5 %     Sig: Place 1 patch onto the skin once daily. Remove & Discard patch within 12 hours or as directed by MD. May substitute generic brand.     Dispense:  30 patch     Refill:  2    naproxen (NAPROSYN) 500 MG tablet     Sig: Take 1 tablet (500 mg total) by mouth 2 (two) times daily with meals.     Dispense:  60 tablet     Refill:  1    tiZANidine (ZANAFLEX) 4 MG tablet     Sig: Take 1 tablet (4 mg total) by mouth 2 (two) times daily as needed (muscle spasms). Take off duty only. May cause drowsiness.     Dispense:  20 tablet     Refill:  1     Patient Instructions: Daily home exercises/warm soaks(Continue with no mopping or sweeping at work.)   Restrictions: Avoid frequent bending/lifting/twisting, No lifting/pushing/pulling more than 10 lbs, No above the shoulder/overhead work  Follow up in about 2 weeks (around 12/21/2020).

## 2020-12-07 NOTE — LETTER
[facility] [place]  [address], [address]  [place] [address]  Phone: [phone]  Fax: [phone]  [facility]: [id number]    Pt Name: Mehul Randhawa  Injury Date: 08/25/2017   Employee ID: [id number] Date of Treatment: 12/07/2020   Company: [employer]      Appointment Time: 1:00 PM Arrived: 12:58 PM   Provider: Misael Hinojosa MD Time Out: 1:40 PM     Office Treatment:   1. Chronic midline low back pain without sciatica    2. Osteoarthritis of lumbar spine, unspecified spinal osteoarthritis complication status    3. Acute exacerbation of chronic low back pain    4. Strain of lumbar region, subsequent encounter    5. Lumbar strain, subsequent encounter      Medications Ordered This Encounter   Medications    lidocaine (LIDODERM) 5 %    naproxen (NAPROSYN) 500 MG tablet    tiZANidine (ZANAFLEX) 4 MG tablet      Patient Instructions: Daily home exercises/warm soaks(Continue with no mopping or sweeping at work.)      Restrictions: Avoid frequent bending/lifting/twisting, No lifting/pushing/pulling more than 10 lbs, No above the shoulder/overhead work     Return Appointment: 12/21/2020 at 9:30 AM       SH

## 2020-12-21 ENCOUNTER — OFFICE VISIT (OUTPATIENT)
Dept: URGENT CARE | Facility: CLINIC | Age: 50
End: 2020-12-21
Payer: COMMERCIAL

## 2020-12-21 DIAGNOSIS — S39.012D STRAIN OF LUMBAR REGION, SUBSEQUENT ENCOUNTER: ICD-10-CM

## 2020-12-21 DIAGNOSIS — M54.9 DORSALGIA, UNSPECIFIED: ICD-10-CM

## 2020-12-21 DIAGNOSIS — G89.29 CHRONIC MIDLINE LOW BACK PAIN WITHOUT SCIATICA: Primary | ICD-10-CM

## 2020-12-21 DIAGNOSIS — S39.012D LUMBAR STRAIN, SUBSEQUENT ENCOUNTER: ICD-10-CM

## 2020-12-21 DIAGNOSIS — G89.29 ACUTE EXACERBATION OF CHRONIC LOW BACK PAIN: ICD-10-CM

## 2020-12-21 DIAGNOSIS — M47.816 OSTEOARTHRITIS OF LUMBAR SPINE, UNSPECIFIED SPINAL OSTEOARTHRITIS COMPLICATION STATUS: ICD-10-CM

## 2020-12-21 DIAGNOSIS — M54.50 ACUTE EXACERBATION OF CHRONIC LOW BACK PAIN: ICD-10-CM

## 2020-12-21 DIAGNOSIS — M54.50 CHRONIC MIDLINE LOW BACK PAIN WITHOUT SCIATICA: Primary | ICD-10-CM

## 2020-12-21 PROCEDURE — 99214 PR OFFICE/OUTPT VISIT, EST, LEVL IV, 30-39 MIN: ICD-10-PCS | Mod: S$GLB,,, | Performed by: PREVENTIVE MEDICINE

## 2020-12-21 PROCEDURE — 99214 OFFICE O/P EST MOD 30 MIN: CPT | Mod: S$GLB,,, | Performed by: PREVENTIVE MEDICINE

## 2020-12-21 NOTE — PROGRESS NOTES
Subjective:       Patient ID: Mehul Randhawa is a 50 y.o. male.    Chief Complaint: Back Pain     Follow-up up of Back Pain ( DOI 08- ) Pain score today is 1/10 with complaint of Intermittent Spasms, Stiffness, No report of numbness/tingling. Taking Naprosyn 500mg, Zanaflex 4mg, Lidoderm Patches 5%, Daily home exercises w/wm soaks. SH    Back Pain  Pertinent negatives include no abdominal pain, bladder incontinence, bowel incontinence, dysuria or numbness.       Constitution: Negative for fatigue.   Gastrointestinal: Negative for abdominal pain and bowel incontinence.   Genitourinary: Negative for dysuria, urgency, bladder incontinence and hematuria.   Musculoskeletal: Positive for joint pain, back pain and muscle cramps. Negative for history of spine disorder.   Skin: Negative for rash.   Neurological: Negative for coordination disturbances, numbness and tingling.        Objective:      Physical Exam  Vitals signs and nursing note reviewed.   Constitutional:       Appearance: Normal appearance. He is well-developed.   HENT:      Head: Normocephalic and atraumatic.      Nose: Nose normal.   Eyes:      Pupils: Pupils are equal, round, and reactive to light.   Neck:      Musculoskeletal: Normal range of motion.   Cardiovascular:      Rate and Rhythm: Normal rate and regular rhythm.   Pulmonary:      Effort: Pulmonary effort is normal.      Breath sounds: Normal breath sounds.   Musculoskeletal:      Lumbar back: He exhibits tenderness. He exhibits normal range of motion, no bony tenderness, no swelling, no edema, no deformity, no laceration, no pain, no spasm and normal pulse.        Back:       Comments: Less pain across his lower back with no spasm noted this morning.  He has improved range of motion with complaints of pain on forward flexion to 45°, extension to 10°, and lateral bending to 25°.  He has no motor or sensory deficits about his lower extremities   Skin:     General: Skin is warm and dry.    Neurological:      Mental Status: He is alert and oriented to person, place, and time.         Assessment:       1. Chronic midline low back pain without sciatica    2. Osteoarthritis of lumbar spine, unspecified spinal osteoarthritis complication status    3. Acute exacerbation of chronic low back pain    4. Strain of lumbar region, subsequent encounter    5. Lumbar strain, subsequent encounter    6. Dorsalgia, unspecified        Plan:     this patient has been doing his own exercises with warm soaks for his lower back which has greatly increased his range of motion and reduced his back pain.  He will continue taking Naprosyn 500 mg twice a day with food as needed during the day and Zanaflex 4 mg primarily at night as needed.  He also will apply his lidocaine 5% patch on a daily basis.  He feels able to resume his regular work and will be re-evaluated in approximately 3 weeks.       Patient Instructions: Daily home exercises/warm soaks   Restrictions: Regular Duty  Follow up in about 3 weeks (around 1/11/2021).

## 2020-12-21 NOTE — LETTER
[facility] [place]  [address], [address]  [place] [address]  Phone: [phone]  Fax: [phone]  [facility]: [id number]    Pt Name: Mehul Randhawa  Injury Date: 08/25/2017   Employee ID: [id number] Date of Treatment: 12/21/2020   Company: [employer]      Appointment Time: 09:30 AM Arrived: 9:25am   Provider: Misael Hinojosa MD Time Out:10:20am     Office Treatment:   1. Chronic midline low back pain without sciatica    2. Osteoarthritis of lumbar spine, unspecified spinal osteoarthritis complication status    3. Acute exacerbation of chronic low back pain    4. Strain of lumbar region, subsequent encounter    5. Lumbar strain, subsequent encounter    6. Dorsalgia, unspecified          Patient Instructions: Daily home exercises/warm soaks    Restrictions: Regular Duty     Return Appointment: 1/11/2021 at 9am  SB

## 2020-12-21 NOTE — LETTER
[facility] [place]  [address], [address]  [place] [address]  Phone: [phone]  Fax: [phone]  [facility]: [id number]    Pt Name: Mehul Randhawa  Injury Date: 08/25/2017   Employee ID: [id number] Date of Treatment: 12/21/2020   Company: [employer]      Appointment Time: 09:30 AM Arrived: 9:25am   Provider: Misael Hinojosa MD Time Out: 10:25am     Office Treatment:   1. Chronic midline low back pain without sciatica    2. Osteoarthritis of lumbar spine, unspecified spinal osteoarthritis complication status    3. Acute exacerbation of chronic low back pain    4. Strain of lumbar region, subsequent encounter    5. Lumbar strain, subsequent encounter    6. Dorsalgia, unspecified          Patient Instructions: Daily home exercises/warm soaks    Restrictions: Regular Duty as of 12/22/20      Return Appointment: 1/11/2021 at 9am  SB

## 2021-01-11 ENCOUNTER — OFFICE VISIT (OUTPATIENT)
Dept: URGENT CARE | Facility: CLINIC | Age: 51
End: 2021-01-11
Payer: COMMERCIAL

## 2021-01-11 DIAGNOSIS — G89.29 CHRONIC MIDLINE LOW BACK PAIN WITHOUT SCIATICA: Primary | ICD-10-CM

## 2021-01-11 DIAGNOSIS — M47.816 OSTEOARTHRITIS OF LUMBAR SPINE, UNSPECIFIED SPINAL OSTEOARTHRITIS COMPLICATION STATUS: ICD-10-CM

## 2021-01-11 DIAGNOSIS — S39.012D LUMBAR STRAIN, SUBSEQUENT ENCOUNTER: ICD-10-CM

## 2021-01-11 DIAGNOSIS — G89.29 ACUTE EXACERBATION OF CHRONIC LOW BACK PAIN: ICD-10-CM

## 2021-01-11 DIAGNOSIS — M54.50 ACUTE EXACERBATION OF CHRONIC LOW BACK PAIN: ICD-10-CM

## 2021-01-11 DIAGNOSIS — S39.012D STRAIN OF LUMBAR REGION, SUBSEQUENT ENCOUNTER: ICD-10-CM

## 2021-01-11 DIAGNOSIS — M54.9 DORSALGIA, UNSPECIFIED: ICD-10-CM

## 2021-01-11 DIAGNOSIS — M54.50 CHRONIC MIDLINE LOW BACK PAIN WITHOUT SCIATICA: Primary | ICD-10-CM

## 2021-01-11 PROCEDURE — 99214 OFFICE O/P EST MOD 30 MIN: CPT | Mod: S$GLB,,, | Performed by: PREVENTIVE MEDICINE

## 2021-01-11 PROCEDURE — 99214 PR OFFICE/OUTPT VISIT, EST, LEVL IV, 30-39 MIN: ICD-10-PCS | Mod: S$GLB,,, | Performed by: PREVENTIVE MEDICINE

## 2021-01-11 RX ORDER — NAPROXEN 500 MG/1
500 TABLET ORAL 2 TIMES DAILY WITH MEALS
Qty: 60 TABLET | Refills: 1 | Status: SHIPPED | OUTPATIENT
Start: 2021-01-11 | End: 2021-03-08 | Stop reason: SDUPTHER

## 2021-01-11 RX ORDER — TIZANIDINE 4 MG/1
4 TABLET ORAL 2 TIMES DAILY PRN
Qty: 20 TABLET | Refills: 1 | Status: SHIPPED | OUTPATIENT
Start: 2021-01-11 | End: 2021-03-08 | Stop reason: SDUPTHER

## 2021-01-11 RX ORDER — LIDOCAINE 50 MG/G
1 PATCH TOPICAL DAILY
Qty: 30 PATCH | Refills: 2 | Status: SHIPPED | OUTPATIENT
Start: 2021-01-11 | End: 2021-03-08 | Stop reason: SDUPTHER

## 2021-02-08 ENCOUNTER — OFFICE VISIT (OUTPATIENT)
Dept: URGENT CARE | Facility: CLINIC | Age: 51
End: 2021-02-08
Payer: COMMERCIAL

## 2021-02-08 DIAGNOSIS — G89.29 CHRONIC MIDLINE LOW BACK PAIN WITHOUT SCIATICA: Primary | ICD-10-CM

## 2021-02-08 DIAGNOSIS — M54.50 CHRONIC MIDLINE LOW BACK PAIN WITHOUT SCIATICA: Primary | ICD-10-CM

## 2021-02-08 DIAGNOSIS — M47.816 OSTEOARTHRITIS OF LUMBAR SPINE, UNSPECIFIED SPINAL OSTEOARTHRITIS COMPLICATION STATUS: ICD-10-CM

## 2021-02-08 DIAGNOSIS — S39.012D LUMBAR STRAIN, SUBSEQUENT ENCOUNTER: ICD-10-CM

## 2021-02-08 DIAGNOSIS — S39.012D STRAIN OF LUMBAR REGION, SUBSEQUENT ENCOUNTER: ICD-10-CM

## 2021-02-08 PROCEDURE — 99213 PR OFFICE/OUTPT VISIT, EST, LEVL III, 20-29 MIN: ICD-10-PCS | Mod: S$GLB,,, | Performed by: PREVENTIVE MEDICINE

## 2021-02-08 PROCEDURE — 99213 OFFICE O/P EST LOW 20 MIN: CPT | Mod: S$GLB,,, | Performed by: PREVENTIVE MEDICINE

## 2021-03-08 ENCOUNTER — OFFICE VISIT (OUTPATIENT)
Dept: URGENT CARE | Facility: CLINIC | Age: 51
End: 2021-03-08
Payer: COMMERCIAL

## 2021-03-08 DIAGNOSIS — S39.012D STRAIN OF LUMBAR REGION, SUBSEQUENT ENCOUNTER: ICD-10-CM

## 2021-03-08 DIAGNOSIS — G89.29 ACUTE EXACERBATION OF CHRONIC LOW BACK PAIN: ICD-10-CM

## 2021-03-08 DIAGNOSIS — M54.50 CHRONIC MIDLINE LOW BACK PAIN WITHOUT SCIATICA: Primary | ICD-10-CM

## 2021-03-08 DIAGNOSIS — M47.816 OSTEOARTHRITIS OF LUMBAR SPINE, UNSPECIFIED SPINAL OSTEOARTHRITIS COMPLICATION STATUS: ICD-10-CM

## 2021-03-08 DIAGNOSIS — G89.29 CHRONIC MIDLINE LOW BACK PAIN WITHOUT SCIATICA: Primary | ICD-10-CM

## 2021-03-08 DIAGNOSIS — M54.50 ACUTE EXACERBATION OF CHRONIC LOW BACK PAIN: ICD-10-CM

## 2021-03-08 PROCEDURE — 99214 PR OFFICE/OUTPT VISIT, EST, LEVL IV, 30-39 MIN: ICD-10-PCS | Mod: S$GLB,,, | Performed by: PREVENTIVE MEDICINE

## 2021-03-08 PROCEDURE — 99214 OFFICE O/P EST MOD 30 MIN: CPT | Mod: S$GLB,,, | Performed by: PREVENTIVE MEDICINE

## 2021-03-08 RX ORDER — LIDOCAINE 50 MG/G
1 PATCH TOPICAL DAILY
Qty: 30 PATCH | Refills: 2 | Status: SHIPPED | OUTPATIENT
Start: 2021-03-08 | End: 2021-12-16 | Stop reason: SDUPTHER

## 2021-03-08 RX ORDER — NAPROXEN 500 MG/1
500 TABLET ORAL 2 TIMES DAILY WITH MEALS
Qty: 60 TABLET | Refills: 1 | Status: SHIPPED | OUTPATIENT
Start: 2021-03-08 | End: 2021-05-10 | Stop reason: SDUPTHER

## 2021-03-08 RX ORDER — TIZANIDINE 4 MG/1
4 TABLET ORAL 2 TIMES DAILY PRN
Qty: 20 TABLET | Refills: 1 | Status: SHIPPED | OUTPATIENT
Start: 2021-03-08 | End: 2021-05-10 | Stop reason: SDUPTHER

## 2021-03-24 ENCOUNTER — IMMUNIZATION (OUTPATIENT)
Dept: PRIMARY CARE CLINIC | Facility: CLINIC | Age: 51
End: 2021-03-24
Payer: COMMERCIAL

## 2021-03-24 DIAGNOSIS — Z23 NEED FOR VACCINATION: Primary | ICD-10-CM

## 2021-03-24 PROCEDURE — 0001A COVID-19, MRNA, LNP-S, PF, 30 MCG/0.3 ML DOSE VACCINE: CPT | Mod: CV19,S$GLB,, | Performed by: INTERNAL MEDICINE

## 2021-03-24 PROCEDURE — 91300 COVID-19, MRNA, LNP-S, PF, 30 MCG/0.3 ML DOSE VACCINE: ICD-10-PCS | Mod: S$GLB,,, | Performed by: INTERNAL MEDICINE

## 2021-03-24 PROCEDURE — 91300 COVID-19, MRNA, LNP-S, PF, 30 MCG/0.3 ML DOSE VACCINE: CPT | Mod: S$GLB,,, | Performed by: INTERNAL MEDICINE

## 2021-03-24 PROCEDURE — 0001A COVID-19, MRNA, LNP-S, PF, 30 MCG/0.3 ML DOSE VACCINE: ICD-10-PCS | Mod: CV19,S$GLB,, | Performed by: INTERNAL MEDICINE

## 2021-04-14 ENCOUNTER — IMMUNIZATION (OUTPATIENT)
Dept: PRIMARY CARE CLINIC | Facility: CLINIC | Age: 51
End: 2021-04-14
Payer: COMMERCIAL

## 2021-04-14 DIAGNOSIS — Z23 NEED FOR VACCINATION: Primary | ICD-10-CM

## 2021-04-14 PROCEDURE — 0002A COVID-19, MRNA, LNP-S, PF, 30 MCG/0.3 ML DOSE VACCINE: CPT | Mod: CV19,S$GLB,, | Performed by: INTERNAL MEDICINE

## 2021-04-14 PROCEDURE — 0002A COVID-19, MRNA, LNP-S, PF, 30 MCG/0.3 ML DOSE VACCINE: ICD-10-PCS | Mod: CV19,S$GLB,, | Performed by: INTERNAL MEDICINE

## 2021-04-14 PROCEDURE — 91300 COVID-19, MRNA, LNP-S, PF, 30 MCG/0.3 ML DOSE VACCINE: CPT | Mod: S$GLB,,, | Performed by: INTERNAL MEDICINE

## 2021-04-14 PROCEDURE — 91300 COVID-19, MRNA, LNP-S, PF, 30 MCG/0.3 ML DOSE VACCINE: ICD-10-PCS | Mod: S$GLB,,, | Performed by: INTERNAL MEDICINE

## 2021-04-19 ENCOUNTER — OFFICE VISIT (OUTPATIENT)
Dept: URGENT CARE | Facility: CLINIC | Age: 51
End: 2021-04-19
Payer: COMMERCIAL

## 2021-04-19 DIAGNOSIS — L24.4 IRRITANT CONTACT DERMATITIS DUE TO DRUG IN CONTACT WITH SKIN: ICD-10-CM

## 2021-04-19 DIAGNOSIS — S39.012D STRAIN OF LUMBAR REGION, SUBSEQUENT ENCOUNTER: ICD-10-CM

## 2021-04-19 DIAGNOSIS — G89.29 ACUTE EXACERBATION OF CHRONIC LOW BACK PAIN: ICD-10-CM

## 2021-04-19 DIAGNOSIS — G89.29 CHRONIC MIDLINE LOW BACK PAIN WITHOUT SCIATICA: Primary | ICD-10-CM

## 2021-04-19 DIAGNOSIS — M54.50 ACUTE EXACERBATION OF CHRONIC LOW BACK PAIN: ICD-10-CM

## 2021-04-19 DIAGNOSIS — M47.816 OSTEOARTHRITIS OF LUMBAR SPINE, UNSPECIFIED SPINAL OSTEOARTHRITIS COMPLICATION STATUS: ICD-10-CM

## 2021-04-19 DIAGNOSIS — M62.830 SPASM OF MUSCLE OF LOWER BACK: ICD-10-CM

## 2021-04-19 DIAGNOSIS — M54.50 CHRONIC MIDLINE LOW BACK PAIN WITHOUT SCIATICA: Primary | ICD-10-CM

## 2021-04-19 PROCEDURE — 99214 OFFICE O/P EST MOD 30 MIN: CPT | Mod: S$GLB,,, | Performed by: PREVENTIVE MEDICINE

## 2021-04-19 PROCEDURE — 99214 PR OFFICE/OUTPT VISIT, EST, LEVL IV, 30-39 MIN: ICD-10-PCS | Mod: S$GLB,,, | Performed by: PREVENTIVE MEDICINE

## 2021-04-19 RX ORDER — BETAMETHASONE VALERATE 1.2 MG/G
OINTMENT TOPICAL 2 TIMES DAILY
Qty: 45 G | Refills: 0 | Status: SHIPPED | OUTPATIENT
Start: 2021-04-19 | End: 2023-10-27

## 2021-05-10 ENCOUNTER — OFFICE VISIT (OUTPATIENT)
Dept: URGENT CARE | Facility: CLINIC | Age: 51
End: 2021-05-10
Payer: COMMERCIAL

## 2021-05-10 DIAGNOSIS — M54.50 ACUTE EXACERBATION OF CHRONIC LOW BACK PAIN: ICD-10-CM

## 2021-05-10 DIAGNOSIS — M54.50 CHRONIC MIDLINE LOW BACK PAIN WITHOUT SCIATICA: Primary | ICD-10-CM

## 2021-05-10 DIAGNOSIS — G89.29 CHRONIC MIDLINE LOW BACK PAIN WITHOUT SCIATICA: Primary | ICD-10-CM

## 2021-05-10 DIAGNOSIS — S39.012D STRAIN OF LUMBAR REGION, SUBSEQUENT ENCOUNTER: ICD-10-CM

## 2021-05-10 DIAGNOSIS — L24.4 IRRITANT CONTACT DERMATITIS DUE TO DRUG IN CONTACT WITH SKIN: ICD-10-CM

## 2021-05-10 DIAGNOSIS — M47.816 OSTEOARTHRITIS OF LUMBAR SPINE, UNSPECIFIED SPINAL OSTEOARTHRITIS COMPLICATION STATUS: ICD-10-CM

## 2021-05-10 DIAGNOSIS — M62.830 SPASM OF MUSCLE OF LOWER BACK: ICD-10-CM

## 2021-05-10 DIAGNOSIS — G89.29 ACUTE EXACERBATION OF CHRONIC LOW BACK PAIN: ICD-10-CM

## 2021-05-10 PROCEDURE — 99214 OFFICE O/P EST MOD 30 MIN: CPT | Mod: S$GLB,,, | Performed by: PREVENTIVE MEDICINE

## 2021-05-10 PROCEDURE — 99214 PR OFFICE/OUTPT VISIT, EST, LEVL IV, 30-39 MIN: ICD-10-PCS | Mod: S$GLB,,, | Performed by: PREVENTIVE MEDICINE

## 2021-05-10 RX ORDER — TIZANIDINE 4 MG/1
4 TABLET ORAL 2 TIMES DAILY PRN
Qty: 20 TABLET | Refills: 1 | Status: SHIPPED | OUTPATIENT
Start: 2021-05-10 | End: 2021-12-16 | Stop reason: ALTCHOICE

## 2021-05-10 RX ORDER — NAPROXEN 500 MG/1
500 TABLET ORAL 2 TIMES DAILY WITH MEALS
Qty: 60 TABLET | Refills: 1 | Status: SHIPPED | OUTPATIENT
Start: 2021-05-10 | End: 2021-10-14 | Stop reason: SDUPTHER

## 2021-05-10 RX ORDER — DICLOFENAC SODIUM 10 MG/G
2 GEL TOPICAL DAILY
Qty: 100 G | Refills: 1 | Status: SHIPPED | OUTPATIENT
Start: 2021-05-10 | End: 2021-10-14 | Stop reason: SDUPTHER

## 2021-06-10 ENCOUNTER — OFFICE VISIT (OUTPATIENT)
Dept: URGENT CARE | Facility: CLINIC | Age: 51
End: 2021-06-10
Payer: COMMERCIAL

## 2021-06-10 DIAGNOSIS — S39.012D STRAIN OF LUMBAR REGION, SUBSEQUENT ENCOUNTER: ICD-10-CM

## 2021-06-10 DIAGNOSIS — M54.50 CHRONIC MIDLINE LOW BACK PAIN WITHOUT SCIATICA: Primary | ICD-10-CM

## 2021-06-10 DIAGNOSIS — M47.816 OSTEOARTHRITIS OF LUMBAR SPINE, UNSPECIFIED SPINAL OSTEOARTHRITIS COMPLICATION STATUS: ICD-10-CM

## 2021-06-10 DIAGNOSIS — L24.4 IRRITANT CONTACT DERMATITIS DUE TO DRUG IN CONTACT WITH SKIN: ICD-10-CM

## 2021-06-10 DIAGNOSIS — G89.29 CHRONIC MIDLINE LOW BACK PAIN WITHOUT SCIATICA: Primary | ICD-10-CM

## 2021-06-10 DIAGNOSIS — M62.830 SPASM OF MUSCLE OF LOWER BACK: ICD-10-CM

## 2021-06-10 PROCEDURE — 99214 PR OFFICE/OUTPT VISIT, EST, LEVL IV, 30-39 MIN: ICD-10-PCS | Mod: S$GLB,,, | Performed by: PREVENTIVE MEDICINE

## 2021-06-10 PROCEDURE — 99214 OFFICE O/P EST MOD 30 MIN: CPT | Mod: S$GLB,,, | Performed by: PREVENTIVE MEDICINE

## 2021-07-16 ENCOUNTER — OFFICE VISIT (OUTPATIENT)
Dept: URGENT CARE | Facility: CLINIC | Age: 51
End: 2021-07-16
Payer: COMMERCIAL

## 2021-07-16 DIAGNOSIS — M47.816 OSTEOARTHRITIS OF LUMBAR SPINE, UNSPECIFIED SPINAL OSTEOARTHRITIS COMPLICATION STATUS: ICD-10-CM

## 2021-07-16 DIAGNOSIS — M62.830 SPASM OF MUSCLE OF LOWER BACK: ICD-10-CM

## 2021-07-16 DIAGNOSIS — S39.012D STRAIN OF LUMBAR REGION, SUBSEQUENT ENCOUNTER: ICD-10-CM

## 2021-07-16 DIAGNOSIS — G89.29 CHRONIC MIDLINE LOW BACK PAIN WITHOUT SCIATICA: Primary | ICD-10-CM

## 2021-07-16 DIAGNOSIS — M54.50 CHRONIC MIDLINE LOW BACK PAIN WITHOUT SCIATICA: Primary | ICD-10-CM

## 2021-07-16 PROCEDURE — 99214 OFFICE O/P EST MOD 30 MIN: CPT | Mod: S$GLB,,, | Performed by: PREVENTIVE MEDICINE

## 2021-07-16 PROCEDURE — 99214 PR OFFICE/OUTPT VISIT, EST, LEVL IV, 30-39 MIN: ICD-10-PCS | Mod: S$GLB,,, | Performed by: PREVENTIVE MEDICINE

## 2021-07-20 NOTE — LETTER
[facility] [place]    [address], [address]  [place] [address]  Phone: [phone]  Fax: [phone]    Pt Name: Mehul Randhawa  Injury Date: 08/25/2017   Employee ID:  Date ofTreatment: 06/25/2018   Company: [employer]            Appointment Time: 01:15 PM Arrived:  1:30 PM CDT   Appointment Date: 6/25/2018 Time Out: 2:45   Physician: Misael Hinojosa MD        Office Treatment: Mehul was seen today for back pain.    Diagnoses and all orders for this visit:    Strain of lumbar region, subsequent encounter  -     naproxen (NAPROSYN) 500 MG tablet; Take 1 tablet (500 mg total) by mouth 2 (two) times daily with meals.    Chronic midline low back pain without sciatica  -     lidocaine (LIDODERM) 5 %; Place 1 patch onto the skin once daily. Remove & Discard patch within 12 hours or as directed by MD    Lumbar strain, subsequent encounter       Patient Instructions: Daily home exercises/warm soaks    Restrictions: Regular Duty       Return Appointment: 7/30/2018 at 9:00        Detail Level: Zone

## 2021-09-28 ENCOUNTER — TELEPHONE (OUTPATIENT)
Dept: URGENT CARE | Facility: CLINIC | Age: 51
End: 2021-09-28

## 2021-10-14 ENCOUNTER — OFFICE VISIT (OUTPATIENT)
Dept: URGENT CARE | Facility: CLINIC | Age: 51
End: 2021-10-14
Payer: COMMERCIAL

## 2021-10-14 DIAGNOSIS — M54.50 ACUTE EXACERBATION OF CHRONIC LOW BACK PAIN: ICD-10-CM

## 2021-10-14 DIAGNOSIS — S39.012D STRAIN OF LUMBAR REGION, SUBSEQUENT ENCOUNTER: ICD-10-CM

## 2021-10-14 DIAGNOSIS — M54.50 CHRONIC MIDLINE LOW BACK PAIN WITHOUT SCIATICA: Primary | ICD-10-CM

## 2021-10-14 DIAGNOSIS — M62.830 SPASM OF MUSCLE OF LOWER BACK: ICD-10-CM

## 2021-10-14 DIAGNOSIS — M47.816 OSTEOARTHRITIS OF LUMBAR SPINE, UNSPECIFIED SPINAL OSTEOARTHRITIS COMPLICATION STATUS: ICD-10-CM

## 2021-10-14 DIAGNOSIS — G89.29 ACUTE EXACERBATION OF CHRONIC LOW BACK PAIN: ICD-10-CM

## 2021-10-14 DIAGNOSIS — G89.29 CHRONIC MIDLINE LOW BACK PAIN WITHOUT SCIATICA: Primary | ICD-10-CM

## 2021-10-14 PROCEDURE — 99214 OFFICE O/P EST MOD 30 MIN: CPT | Mod: S$GLB,,, | Performed by: PREVENTIVE MEDICINE

## 2021-10-14 PROCEDURE — 99214 PR OFFICE/OUTPT VISIT, EST, LEVL IV, 30-39 MIN: ICD-10-PCS | Mod: S$GLB,,, | Performed by: PREVENTIVE MEDICINE

## 2021-10-14 RX ORDER — NAPROXEN 500 MG/1
500 TABLET ORAL 2 TIMES DAILY WITH MEALS
Qty: 60 TABLET | Refills: 1 | Status: SHIPPED | OUTPATIENT
Start: 2021-10-14 | End: 2021-12-16 | Stop reason: SDUPTHER

## 2021-10-14 RX ORDER — DICLOFENAC SODIUM 10 MG/G
2 GEL TOPICAL DAILY
Qty: 100 G | Refills: 1 | Status: SHIPPED | OUTPATIENT
Start: 2021-10-14 | End: 2022-01-20 | Stop reason: SDUPTHER

## 2021-11-18 ENCOUNTER — OFFICE VISIT (OUTPATIENT)
Dept: URGENT CARE | Facility: CLINIC | Age: 51
End: 2021-11-18
Payer: COMMERCIAL

## 2021-11-18 DIAGNOSIS — M54.50 ACUTE EXACERBATION OF CHRONIC LOW BACK PAIN: ICD-10-CM

## 2021-11-18 DIAGNOSIS — S39.012D STRAIN OF LUMBAR REGION, SUBSEQUENT ENCOUNTER: ICD-10-CM

## 2021-11-18 DIAGNOSIS — M54.50 CHRONIC MIDLINE LOW BACK PAIN WITHOUT SCIATICA: Primary | ICD-10-CM

## 2021-11-18 DIAGNOSIS — G89.29 ACUTE EXACERBATION OF CHRONIC LOW BACK PAIN: ICD-10-CM

## 2021-11-18 DIAGNOSIS — M47.816 OSTEOARTHRITIS OF LUMBAR SPINE, UNSPECIFIED SPINAL OSTEOARTHRITIS COMPLICATION STATUS: ICD-10-CM

## 2021-11-18 DIAGNOSIS — G89.29 CHRONIC MIDLINE LOW BACK PAIN WITHOUT SCIATICA: Primary | ICD-10-CM

## 2021-11-18 DIAGNOSIS — M62.830 SPASM OF MUSCLE OF LOWER BACK: ICD-10-CM

## 2021-11-18 PROCEDURE — 99214 OFFICE O/P EST MOD 30 MIN: CPT | Mod: S$GLB,,, | Performed by: PREVENTIVE MEDICINE

## 2021-11-18 PROCEDURE — 99214 PR OFFICE/OUTPT VISIT, EST, LEVL IV, 30-39 MIN: ICD-10-PCS | Mod: S$GLB,,, | Performed by: PREVENTIVE MEDICINE

## 2021-12-16 ENCOUNTER — OFFICE VISIT (OUTPATIENT)
Dept: URGENT CARE | Facility: CLINIC | Age: 51
End: 2021-12-16
Payer: COMMERCIAL

## 2021-12-16 DIAGNOSIS — G89.29 ACUTE EXACERBATION OF CHRONIC LOW BACK PAIN: ICD-10-CM

## 2021-12-16 DIAGNOSIS — Z02.6 ENCOUNTER RELATED TO WORKER'S COMPENSATION CLAIM: Primary | ICD-10-CM

## 2021-12-16 DIAGNOSIS — M54.50 ACUTE EXACERBATION OF CHRONIC LOW BACK PAIN: ICD-10-CM

## 2021-12-16 DIAGNOSIS — S39.012D STRAIN OF LUMBAR REGION, SUBSEQUENT ENCOUNTER: ICD-10-CM

## 2021-12-16 DIAGNOSIS — Z13.9 ENCOUNTER FOR SCREENING: ICD-10-CM

## 2021-12-16 DIAGNOSIS — M47.816 OSTEOARTHRITIS OF LUMBAR SPINE, UNSPECIFIED SPINAL OSTEOARTHRITIS COMPLICATION STATUS: ICD-10-CM

## 2021-12-16 DIAGNOSIS — M54.50 CHRONIC MIDLINE LOW BACK PAIN WITHOUT SCIATICA: ICD-10-CM

## 2021-12-16 DIAGNOSIS — G89.29 CHRONIC MIDLINE LOW BACK PAIN WITHOUT SCIATICA: ICD-10-CM

## 2021-12-16 PROCEDURE — 99214 PR OFFICE/OUTPT VISIT, EST, LEVL IV, 30-39 MIN: ICD-10-PCS | Mod: S$GLB,,, | Performed by: PREVENTIVE MEDICINE

## 2021-12-16 PROCEDURE — 99214 OFFICE O/P EST MOD 30 MIN: CPT | Mod: S$GLB,,, | Performed by: PREVENTIVE MEDICINE

## 2021-12-16 RX ORDER — NAPROXEN 500 MG/1
500 TABLET ORAL 2 TIMES DAILY WITH MEALS
Qty: 60 TABLET | Refills: 1 | Status: SHIPPED | OUTPATIENT
Start: 2021-12-16 | End: 2022-01-20 | Stop reason: SDUPTHER

## 2021-12-16 RX ORDER — TIZANIDINE 4 MG/1
4 TABLET ORAL NIGHTLY
Qty: 30 TABLET | Refills: 1 | Status: SHIPPED | OUTPATIENT
Start: 2021-12-16 | End: 2022-01-15

## 2021-12-16 RX ORDER — LIDOCAINE 50 MG/G
1 PATCH TOPICAL DAILY
Qty: 30 PATCH | Refills: 2 | Status: SHIPPED | OUTPATIENT
Start: 2021-12-16 | End: 2022-01-20 | Stop reason: SDUPTHER

## 2022-01-20 ENCOUNTER — OFFICE VISIT (OUTPATIENT)
Dept: URGENT CARE | Facility: CLINIC | Age: 52
End: 2022-01-20
Payer: COMMERCIAL

## 2022-01-20 DIAGNOSIS — M54.50 CHRONIC MIDLINE LOW BACK PAIN WITHOUT SCIATICA: ICD-10-CM

## 2022-01-20 DIAGNOSIS — S39.012D STRAIN OF LUMBAR REGION, SUBSEQUENT ENCOUNTER: ICD-10-CM

## 2022-01-20 DIAGNOSIS — M54.50 ACUTE EXACERBATION OF CHRONIC LOW BACK PAIN: ICD-10-CM

## 2022-01-20 DIAGNOSIS — M47.816 OSTEOARTHRITIS OF LUMBAR SPINE, UNSPECIFIED SPINAL OSTEOARTHRITIS COMPLICATION STATUS: ICD-10-CM

## 2022-01-20 DIAGNOSIS — G89.29 ACUTE EXACERBATION OF CHRONIC LOW BACK PAIN: ICD-10-CM

## 2022-01-20 DIAGNOSIS — G89.29 CHRONIC MIDLINE LOW BACK PAIN WITHOUT SCIATICA: ICD-10-CM

## 2022-01-20 DIAGNOSIS — Z02.6 ENCOUNTER RELATED TO WORKER'S COMPENSATION CLAIM: Primary | ICD-10-CM

## 2022-01-20 PROCEDURE — 99214 OFFICE O/P EST MOD 30 MIN: CPT | Mod: S$GLB,,, | Performed by: PREVENTIVE MEDICINE

## 2022-01-20 PROCEDURE — 99214 PR OFFICE/OUTPT VISIT, EST, LEVL IV, 30-39 MIN: ICD-10-PCS | Mod: S$GLB,,, | Performed by: PREVENTIVE MEDICINE

## 2022-01-20 RX ORDER — TIZANIDINE 4 MG/1
4 TABLET ORAL NIGHTLY
Qty: 30 TABLET | Refills: 1 | Status: SHIPPED | OUTPATIENT
Start: 2022-01-20 | End: 2022-02-19

## 2022-01-20 RX ORDER — LIDOCAINE 50 MG/G
1 PATCH TOPICAL DAILY
Qty: 30 PATCH | Refills: 2 | Status: SHIPPED | OUTPATIENT
Start: 2022-01-20 | End: 2022-03-03 | Stop reason: SDUPTHER

## 2022-01-20 RX ORDER — DICLOFENAC SODIUM 10 MG/G
2 GEL TOPICAL DAILY
Qty: 100 G | Refills: 1 | Status: SHIPPED | OUTPATIENT
Start: 2022-01-20 | End: 2022-04-21 | Stop reason: ALTCHOICE

## 2022-01-20 RX ORDER — NAPROXEN 500 MG/1
500 TABLET ORAL 2 TIMES DAILY WITH MEALS
Qty: 60 TABLET | Refills: 1 | Status: SHIPPED | OUTPATIENT
Start: 2022-01-20 | End: 2022-03-03 | Stop reason: SDUPTHER

## 2022-01-20 NOTE — PROGRESS NOTES
Subjective:       Patient ID: Mehul Randhawa is a 51 y.o. male.    Chief Complaint: Back Pain    RV, Back Pain ( DOI 08- )Patient is here to follow up on a low back injury. Pain score 2/10 Patient complaints of little intermittent stiffness and left leg pain. Taking Naprosyn 500mg prn, Tizanidine 4mg at night, using Voltaren 1% gel, doing daily home exercises w/wm sks. mcj          Back Pain  Associated symptoms include numbness.       Constitution: Negative.   HENT: Negative.    Cardiovascular: Negative.    Eyes: Negative.    Respiratory: Negative.    Gastrointestinal: Negative.    Endocrine: negative.   Genitourinary: Negative.    Musculoskeletal: Positive for back pain, pain with walking and muscle cramps. Negative for pain, joint swelling, abnormal ROM of joint and muscle ache.   Skin: Negative.  Negative for color change, wound, abrasion, laceration, puncture wound, erythema and bruising.   Allergic/Immunologic: Negative.    Neurological: Positive for numbness and tingling.   Psychiatric/Behavioral: Negative.         Objective:      Physical Exam  Vitals and nursing note reviewed.   Constitutional:       Appearance: He is well-developed.   HENT:      Head: Normocephalic.   Eyes:      Pupils: Pupils are equal, round, and reactive to light.   Cardiovascular:      Rate and Rhythm: Normal rate.   Pulmonary:      Effort: Pulmonary effort is normal.   Musculoskeletal:      Cervical back: Normal range of motion.      Lumbar back: Spasms and tenderness present. No swelling, edema, deformity, lacerations or bony tenderness. Decreased range of motion. Negative right straight leg raise test and negative left straight leg raise test. No scoliosis.        Back:       Comments: Less pain across patient's lower back with both palpation and range of motion testing.  On inspection and palpation of the lower back patient has evidence of  spasm and pain with forward flexion to approximately 90°, extension to 10°, lateral  bending rotation of 45°.  He has no motor sensory deficits about his lower extremities.   Skin:     General: Skin is warm and dry.      Findings: No erythema.   Neurological:      Mental Status: He is alert and oriented to person, place, and time.         Assessment:       1. Encounter related to worker's compensation claim    2. Chronic midline low back pain without sciatica    3. Osteoarthritis of lumbar spine, unspecified spinal osteoarthritis complication status    4. Strain of lumbar region, subsequent encounter    5. 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  He continues doing his exercises for his lower back with warm soaks on a daily basis he continues to use the Voltaren gel and lidocaine patches for his back pain.  Medications Ordered This Encounter   Medications    diclofenac sodium (VOLTAREN) 1 % Gel     Sig: Apply 2 g topically once daily. Use gloves to apply     Dispense:  100 g     Refill:  1    LIDOcaine (LIDODERM) 5 %     Sig: Place 1 patch onto the skin once daily. Remove & Discard patch within 12 hours or as directed by MD. May substitute generic brand.     Dispense:  30 patch     Refill:  2    naproxen (NAPROSYN) 500 MG tablet     Sig: Take 1 tablet (500 mg total) by mouth 2 (two) times daily with meals.     Dispense:  60 tablet     Refill:  1    tiZANidine (ZANAFLEX) 4 MG tablet     Sig: Take 1 tablet (4 mg total) by mouth nightly.     Dispense:  30 tablet     Refill:  1     Patient Instructions: Daily home exercises/warm soaks   Restrictions: Regular Duty  Follow up in about 6 weeks (around 3/3/2022).

## 2022-01-20 NOTE — LETTER
[facility]  [address], [address]  [place] [address]  Phone: [phone]  Fax: [phone]  [facility]: [id number]    Pt Name: Mehul Randhawa  Injury Date: 08/25/2017   Employee ID: [id number] Date of Treatment: 01/20/2022   Company: [employer]      Appointment Time: 09:30 AM Arrived: 9:29 AM   Provider: Misael Hinojosa MD Time Out: 10:55 AM     Office Treatment:   1. Encounter related to worker's compensation claim    2. Chronic midline low back pain without sciatica    3. Osteoarthritis of lumbar spine, unspecified spinal osteoarthritis complication status    4. Strain of lumbar region, subsequent encounter    5. Acute exacerbation of chronic low back pain      Medications Ordered This Encounter   Medications    diclofenac sodium (VOLTAREN) 1 % Gel    LIDOcaine (LIDODERM) 5 %    naproxen (NAPROSYN) 500 MG tablet    tiZANidine (ZANAFLEX) 4 MG tablet      Patient Instructions: Daily home exercises/warm soaks      Restrictions: Regular Duty     Return Appointment: 3/3/2022 at  9:00 AM  BRE

## 2022-02-12 ENCOUNTER — CLINICAL SUPPORT (OUTPATIENT)
Dept: OTHER | Facility: CLINIC | Age: 52
End: 2022-02-12
Payer: COMMERCIAL

## 2022-02-12 DIAGNOSIS — Z00.8 ENCOUNTER FOR OTHER GENERAL EXAMINATION: ICD-10-CM

## 2022-02-13 VITALS — BODY MASS INDEX: 23 KG/M2 | HEIGHT: 64 IN

## 2022-02-13 LAB
HDLC SERPL-MCNC: 67 MG/DL
POC CHOLESTEROL, LDL (DOCK): 107 MG/DL
POC CHOLESTEROL, TOTAL: 184 MG/DL
POC GLUCOSE, FASTING: 89 MG/DL (ref 60–110)
TRIGL SERPL-MCNC: 53 MG/DL

## 2022-03-03 ENCOUNTER — OFFICE VISIT (OUTPATIENT)
Dept: URGENT CARE | Facility: CLINIC | Age: 52
End: 2022-03-03
Payer: COMMERCIAL

## 2022-03-03 DIAGNOSIS — M47.816 OSTEOARTHRITIS OF LUMBAR SPINE, UNSPECIFIED SPINAL OSTEOARTHRITIS COMPLICATION STATUS: ICD-10-CM

## 2022-03-03 DIAGNOSIS — M54.50 CHRONIC MIDLINE LOW BACK PAIN WITHOUT SCIATICA: ICD-10-CM

## 2022-03-03 DIAGNOSIS — G89.29 ACUTE EXACERBATION OF CHRONIC LOW BACK PAIN: ICD-10-CM

## 2022-03-03 DIAGNOSIS — M54.50 ACUTE EXACERBATION OF CHRONIC LOW BACK PAIN: ICD-10-CM

## 2022-03-03 DIAGNOSIS — G89.29 CHRONIC MIDLINE LOW BACK PAIN WITHOUT SCIATICA: ICD-10-CM

## 2022-03-03 DIAGNOSIS — S39.012D STRAIN OF LUMBAR REGION, SUBSEQUENT ENCOUNTER: ICD-10-CM

## 2022-03-03 DIAGNOSIS — Z02.6 ENCOUNTER RELATED TO WORKER'S COMPENSATION CLAIM: Primary | ICD-10-CM

## 2022-03-03 PROCEDURE — 99214 PR OFFICE/OUTPT VISIT, EST, LEVL IV, 30-39 MIN: ICD-10-PCS | Mod: S$GLB,,, | Performed by: PREVENTIVE MEDICINE

## 2022-03-03 PROCEDURE — 99214 OFFICE O/P EST MOD 30 MIN: CPT | Mod: S$GLB,,, | Performed by: PREVENTIVE MEDICINE

## 2022-03-03 RX ORDER — LIDOCAINE 50 MG/G
1 PATCH TOPICAL DAILY
Qty: 30 PATCH | Refills: 2 | Status: SHIPPED | OUTPATIENT
Start: 2022-03-03 | End: 2022-03-10 | Stop reason: SDUPTHER

## 2022-03-03 RX ORDER — TIZANIDINE 4 MG/1
4 TABLET ORAL NIGHTLY
Qty: 30 TABLET | Refills: 1 | Status: SHIPPED | OUTPATIENT
Start: 2022-03-03 | End: 2022-03-10 | Stop reason: SDUPTHER

## 2022-03-03 RX ORDER — NAPROXEN 500 MG/1
500 TABLET ORAL 2 TIMES DAILY WITH MEALS
Qty: 60 TABLET | Refills: 1 | Status: SHIPPED | OUTPATIENT
Start: 2022-03-03 | End: 2022-03-10 | Stop reason: SDUPTHER

## 2022-03-03 NOTE — LETTER
[facility]  [address], [address]  [place] [address]  Phone: [phone]  Fax: [phone]  [facility]: [id number]    Pt Name: Mehul Randhawa  Injury Date: 08/25/2017   Employee ID: [id number] Date of Treatment: 03/03/2022   Company: [employer]      Appointment Time: 09:00 AM Arrived: 09:00 AM   Provider: [facility] Time Out: 10:30 AM     Office Treatment:   1. Encounter related to worker's compensation claim    2. Chronic midline low back pain without sciatica    3. Osteoarthritis of lumbar spine, unspecified spinal osteoarthritis complication status    4. Strain of lumbar region, subsequent encounter    5. Acute exacerbation of chronic low back pain      Medications Ordered This Encounter   Medications    LIDOcaine (LIDODERM) 5 %    naproxen (NAPROSYN) 500 MG tablet    tiZANidine (ZANAFLEX) 4 MG tablet      Patient Instructions: Daily home exercises/warm soaks      Restrictions: Regular Duty     Return Appointment:   4/21/2022 at 09:15 AM     [facility] will be moving locations effective March 14, 2022    Our New Address will be  [address] [place] [id number]  Monday-Friday 8:30am-5:00pm

## 2022-03-03 NOTE — PROGRESS NOTES
Subjective:       Patient ID: Mehul Randhawa is a 51 y.o. male.    Chief Complaint: Back Pain    RV, Back Pain ( DOI 08- )Patient is here to follow up on a low back injury. Pain score 2/10 Patient complaints of little intermittent stiffness and left leg pain has some pinching. Taking Naprosyn 500mg prn, Tizanidine 4mg at night, using Voltaren 1% gel, doing daily home exercises w/wm sks. mcj          Back Pain  Associated symptoms include numbness.       Constitution: Negative.   HENT: Negative.    Cardiovascular: Negative.    Eyes: Negative.    Respiratory: Negative.    Gastrointestinal: Negative.    Endocrine: negative.   Genitourinary: Negative.    Musculoskeletal: Positive for back pain, pain with walking and muscle cramps. Negative for pain, joint swelling, abnormal ROM of joint and muscle ache.   Skin: Negative.  Negative for color change, wound, abrasion, laceration, puncture wound, erythema and bruising.   Allergic/Immunologic: Negative.    Neurological: Positive for numbness and tingling.   Psychiatric/Behavioral: Negative.         Objective:      Physical Exam  Vitals and nursing note reviewed.   Constitutional:       Appearance: He is well-developed.   HENT:      Head: Normocephalic.   Eyes:      Pupils: Pupils are equal, round, and reactive to light.   Cardiovascular:      Rate and Rhythm: Normal rate.   Pulmonary:      Effort: Pulmonary effort is normal.   Musculoskeletal:      Cervical back: Normal range of motion.      Lumbar back: Spasms and tenderness present. No swelling, edema, deformity, lacerations or bony tenderness. Decreased range of motion. Negative right straight leg raise test and negative left straight leg raise test. No scoliosis.        Back:       Comments: Patient has persistent pain about his left low back with both palpation and range of motion testing.  Pain radiates to his left buttock and is noted with forward flexion to  90°, extension to 10°, lateral bending rotation of 45°.   He has no motor sensory deficits about his lower extremities.  Straight leg raising examination is unremarkable.  Deep tendon reflexes are 1 to 2+ and equal bilaterally.  Distal pulses are equal and intact.   Skin:     General: Skin is warm and dry.      Findings: No erythema.   Neurological:      Mental Status: He is alert and oriented to person, place, and time.         Assessment:       1. Encounter related to worker's compensation claim    2. Chronic midline low back pain without sciatica    3. Osteoarthritis of lumbar spine, unspecified spinal osteoarthritis complication status    4. Strain of lumbar region, subsequent encounter    5. Acute exacerbation of chronic low back pain        Plan:     patient has had intermittent episodes of low back pain radiating to his left buttock and left leg.  However he does not feel he needs further follow-up with neuro surgery or specialists at this time.  He will therefore continue with his warm soaks and exercises for his lower back on a daily basis and his regular duty at work.  He will continue to apply Voltaren gel as well as the medications prescribed.    Medications Ordered This Encounter   Medications    LIDOcaine (LIDODERM) 5 %     Sig: Place 1 patch onto the skin once daily. Remove & Discard patch within 12 hours or as directed by MD. May substitute generic brand.     Dispense:  30 patch     Refill:  2    naproxen (NAPROSYN) 500 MG tablet     Sig: Take 1 tablet (500 mg total) by mouth 2 (two) times daily with meals.     Dispense:  60 tablet     Refill:  1    tiZANidine (ZANAFLEX) 4 MG tablet     Sig: Take 1 tablet (4 mg total) by mouth nightly.     Dispense:  30 tablet     Refill:  1     Patient Instructions: Daily home exercises/warm soaks   Restrictions: Regular Duty  Follow up in about 7 weeks (around 4/21/2022).

## 2022-03-10 ENCOUNTER — TELEPHONE (OUTPATIENT)
Dept: URGENT CARE | Facility: CLINIC | Age: 52
End: 2022-03-10
Payer: COMMERCIAL

## 2022-03-10 DIAGNOSIS — M47.816 OSTEOARTHRITIS OF LUMBAR SPINE, UNSPECIFIED SPINAL OSTEOARTHRITIS COMPLICATION STATUS: ICD-10-CM

## 2022-03-10 DIAGNOSIS — M54.50 CHRONIC MIDLINE LOW BACK PAIN WITHOUT SCIATICA: ICD-10-CM

## 2022-03-10 DIAGNOSIS — G89.29 CHRONIC MIDLINE LOW BACK PAIN WITHOUT SCIATICA: ICD-10-CM

## 2022-03-10 DIAGNOSIS — S39.012D STRAIN OF LUMBAR REGION, SUBSEQUENT ENCOUNTER: ICD-10-CM

## 2022-03-10 DIAGNOSIS — M54.50 ACUTE EXACERBATION OF CHRONIC LOW BACK PAIN: ICD-10-CM

## 2022-03-10 DIAGNOSIS — G89.29 ACUTE EXACERBATION OF CHRONIC LOW BACK PAIN: ICD-10-CM

## 2022-03-10 RX ORDER — TIZANIDINE 4 MG/1
4 TABLET ORAL NIGHTLY
Qty: 30 TABLET | Refills: 1 | Status: SHIPPED | OUTPATIENT
Start: 2022-03-10 | End: 2022-04-09

## 2022-03-10 RX ORDER — LIDOCAINE 50 MG/G
1 PATCH TOPICAL DAILY
Qty: 30 PATCH | Refills: 2 | Status: SHIPPED | OUTPATIENT
Start: 2022-03-10 | End: 2022-04-21 | Stop reason: SDUPTHER

## 2022-03-10 RX ORDER — NAPROXEN 500 MG/1
500 TABLET ORAL 2 TIMES DAILY WITH MEALS
Qty: 60 TABLET | Refills: 1 | Status: SHIPPED | OUTPATIENT
Start: 2022-03-10 | End: 2022-04-21 | Stop reason: SDUPTHER

## 2022-04-21 ENCOUNTER — OFFICE VISIT (OUTPATIENT)
Dept: URGENT CARE | Facility: CLINIC | Age: 52
End: 2022-04-21
Payer: COMMERCIAL

## 2022-04-21 DIAGNOSIS — M54.50 CHRONIC MIDLINE LOW BACK PAIN WITHOUT SCIATICA: ICD-10-CM

## 2022-04-21 DIAGNOSIS — M47.816 OSTEOARTHRITIS OF LUMBAR SPINE, UNSPECIFIED SPINAL OSTEOARTHRITIS COMPLICATION STATUS: ICD-10-CM

## 2022-04-21 DIAGNOSIS — S39.012D STRAIN OF LUMBAR REGION, SUBSEQUENT ENCOUNTER: ICD-10-CM

## 2022-04-21 DIAGNOSIS — G89.29 CHRONIC MIDLINE LOW BACK PAIN WITHOUT SCIATICA: ICD-10-CM

## 2022-04-21 DIAGNOSIS — Z02.6 ENCOUNTER RELATED TO WORKER'S COMPENSATION CLAIM: Primary | ICD-10-CM

## 2022-04-21 DIAGNOSIS — G89.29 ACUTE EXACERBATION OF CHRONIC LOW BACK PAIN: ICD-10-CM

## 2022-04-21 DIAGNOSIS — M54.50 ACUTE EXACERBATION OF CHRONIC LOW BACK PAIN: ICD-10-CM

## 2022-04-21 PROCEDURE — 99214 PR OFFICE/OUTPT VISIT, EST, LEVL IV, 30-39 MIN: ICD-10-PCS | Mod: S$GLB,,, | Performed by: PREVENTIVE MEDICINE

## 2022-04-21 PROCEDURE — 99214 OFFICE O/P EST MOD 30 MIN: CPT | Mod: S$GLB,,, | Performed by: PREVENTIVE MEDICINE

## 2022-04-21 RX ORDER — LIDOCAINE 50 MG/G
1 PATCH TOPICAL DAILY
Qty: 30 PATCH | Refills: 2 | Status: SHIPPED | OUTPATIENT
Start: 2022-04-21 | End: 2023-10-27

## 2022-04-21 RX ORDER — TIZANIDINE 4 MG/1
4 TABLET ORAL NIGHTLY
Qty: 30 TABLET | Refills: 1 | Status: SHIPPED | OUTPATIENT
Start: 2022-04-21 | End: 2022-05-21

## 2022-04-21 RX ORDER — NAPROXEN 500 MG/1
500 TABLET ORAL 2 TIMES DAILY WITH MEALS
Qty: 60 TABLET | Refills: 1 | Status: SHIPPED | OUTPATIENT
Start: 2022-04-21 | End: 2022-06-30 | Stop reason: SDUPTHER

## 2022-04-21 NOTE — LETTER
[facility]  [address] [address]  Phone: [phone]  Fax: [phone]  [facility]: [id number]    Pt Name: Mehul Randhawa  Injury Date: 08/25/2017   Employee ID: [id number] Date of Treatment: 04/21/2022   Company: [employer]      Appointment Time: 09:00 AM Arrived: 9:15 AM   Provider: Misael Hinojosa MD Time Out: 10:55 AM     Office Treatment:   1. Encounter related to worker's compensation claim    2. Chronic midline low back pain without sciatica    3. Osteoarthritis of lumbar spine, unspecified spinal osteoarthritis complication status    4. Strain of lumbar region, subsequent encounter    5. Acute exacerbation of chronic low back pain      Medications Ordered This Encounter   Medications    LIDOcaine (LIDODERM) 5 %    naproxen (NAPROSYN) 500 MG tablet    tiZANidine (ZANAFLEX) 4 MG tablet      Patient Instructions: Daily home exercises/warm soaks      Restrictions: Regular Duty     Return Apt 5/19/2022   at 9:15 AM BRE

## 2022-04-21 NOTE — PROGRESS NOTES
Subjective:       Patient ID: Mehul Randhawa is a 51 y.o. male.    Chief Complaint: Back Pain    RV, Back Pain ( DOI 08- )Patient is here to follow up on a low back injury. Pain score 2/10 Patient complaints of little intermittent stiffness and left leg pain has some pinching. Taking Naprosyn 500mg prn, Tizanidine 4mg at night, and lidocaine 5% patches while doing daily home exercises w/wm sks. mcj patient states that he had difficulty getting the prescriptions for lidocaine 5% and Naprosyn 500 mg filled and the reasons for this will need to be further investigated.          Back Pain  Associated symptoms include numbness.       Constitution: Negative.   HENT: Negative.    Cardiovascular: Negative.    Eyes: Negative.    Respiratory: Negative.    Gastrointestinal: Negative.    Endocrine: negative.   Genitourinary: Negative.    Musculoskeletal: Positive for back pain, pain with walking and muscle cramps. Negative for pain, joint swelling, abnormal ROM of joint and muscle ache.   Skin: Negative.  Negative for color change, wound, abrasion, laceration, puncture wound, erythema and bruising.   Allergic/Immunologic: Negative.    Neurological: Positive for numbness and tingling.   Psychiatric/Behavioral: Negative.         Objective:      Physical Exam  Vitals and nursing note reviewed.   Constitutional:       Appearance: He is well-developed.   HENT:      Head: Normocephalic.   Eyes:      Pupils: Pupils are equal, round, and reactive to light.   Cardiovascular:      Rate and Rhythm: Normal rate.   Pulmonary:      Effort: Pulmonary effort is normal.   Musculoskeletal:      Cervical back: Normal range of motion.      Lumbar back: Spasms and tenderness present. No swelling, edema, deformity, lacerations or bony tenderness. Decreased range of motion. Negative right straight leg raise test and negative left straight leg raise test. No scoliosis.        Back:       Comments: Patient has persistent pain about his left low back  with both palpation and range of motion testing.  Pain radiates to his left buttock and is noted with forward flexion to  90°, extension to 10°, lateral bending rotation of 45°.  He has no motor sensory deficits about his lower extremities.  Straight leg raising examination is unremarkable.  Deep tendon reflexes are 1 to 2+ and equal bilaterally.  Distal pulses are equal and intact.   Skin:     General: Skin is warm and dry.      Findings: No erythema.   Neurological:      Mental Status: He is alert and oriented to person, place, and time.         Assessment:       1. Encounter related to worker's compensation claim    2. Chronic midline low back pain without sciatica    3. Osteoarthritis of lumbar spine, unspecified spinal osteoarthritis complication status    4. Strain of lumbar region, subsequent encounter    5. 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  This has caused intermittent episodes of low back pain radiating to his left buttock and left leg.  He continues to prefer no surgical intervention at this time.  He will therefore continue with the warm soaks sizes previously prescribed.  His medications will be refilled.  He states that there was some difficulty getting this approved since his last evaluation under will be investigated further.    Medications Ordered This Encounter   Medications    LIDOcaine (LIDODERM) 5 %     Sig: Place 1 patch onto the skin once daily. Remove & Discard patch within 12 hours or as directed by MD. May substitute generic brand.     Dispense:  30 patch     Refill:  2    naproxen (NAPROSYN) 500 MG tablet     Sig: Take 1 tablet (500 mg total) by mouth 2 (two) times daily with meals.     Dispense:  60 tablet     Refill:  1    tiZANidine (ZANAFLEX) 4 MG tablet     Sig: Take 1 tablet (4 mg total) by mouth nightly.     Dispense:   30 tablet     Refill:  1     Patient Instructions: Daily home exercises/warm soaks   Restrictions: Regular Duty  Follow up in about 4 weeks (around 5/19/2022).

## 2022-05-19 ENCOUNTER — OFFICE VISIT (OUTPATIENT)
Dept: URGENT CARE | Facility: CLINIC | Age: 52
End: 2022-05-19
Payer: COMMERCIAL

## 2022-05-19 DIAGNOSIS — M54.50 CHRONIC MIDLINE LOW BACK PAIN WITHOUT SCIATICA: ICD-10-CM

## 2022-05-19 DIAGNOSIS — Z02.6 ENCOUNTER RELATED TO WORKER'S COMPENSATION CLAIM: Primary | ICD-10-CM

## 2022-05-19 DIAGNOSIS — G89.29 CHRONIC MIDLINE LOW BACK PAIN WITHOUT SCIATICA: ICD-10-CM

## 2022-05-19 DIAGNOSIS — S39.012D STRAIN OF LUMBAR REGION, SUBSEQUENT ENCOUNTER: ICD-10-CM

## 2022-05-19 PROCEDURE — 99214 PR OFFICE/OUTPT VISIT, EST, LEVL IV, 30-39 MIN: ICD-10-PCS | Mod: S$GLB,,,

## 2022-05-19 PROCEDURE — 99214 OFFICE O/P EST MOD 30 MIN: CPT | Mod: S$GLB,,,

## 2022-05-19 NOTE — PROGRESS NOTES
Subjective:       Patient ID: Mehul Randhawa is a 51 y.o. male.    Chief Complaint: Back Pain (Lower)    He returns today regarding his chronic lower pain.  Continues of intermittent lower back symptoms that is controlled with the use naproxen Zanaflex and lidocaine patches.  He states that recently the lidocaine patches have not been approved by the insurance thus he is only using the other 2 medications.  No worsening in his condition over past 6 weeks.       Constitution: Negative.   HENT: Negative.    Cardiovascular: Negative.    Eyes: Negative.    Respiratory: Negative.    Gastrointestinal: Negative.    Endocrine: negative.   Genitourinary: Negative.    Musculoskeletal: Positive for back pain, pain with walking and muscle cramps. Negative for pain, joint swelling, abnormal ROM of joint and muscle ache.   Skin: Negative.  Negative for color change, wound, abrasion, laceration, puncture wound, erythema and bruising.   Allergic/Immunologic: Negative.    Neurological: Positive for numbness and tingling.   Psychiatric/Behavioral: Negative.         Objective:      Physical Exam  Vitals and nursing note reviewed.   Constitutional:       Appearance: He is well-developed.   HENT:      Head: Normocephalic.   Eyes:      Pupils: Pupils are equal, round, and reactive to light.   Cardiovascular:      Rate and Rhythm: Normal rate.   Pulmonary:      Effort: Pulmonary effort is normal.   Musculoskeletal:      Cervical back: Normal range of motion.      Lumbar back: Spasms and tenderness present. No swelling, edema, deformity, lacerations or bony tenderness. Decreased range of motion. Negative right straight leg raise test and negative left straight leg raise test. No scoliosis.        Back:       Comments: Patient has persistent pain about his left low back with both palpation and range of motion testing.  Pain radiates to his left buttock and is noted with forward flexion to  90°, extension to 10°, lateral bending rotation of 45°.   He has no motor sensory deficits about his lower extremities.  Straight leg raising examination is unremarkable.  Deep tendon reflexes are 2+ and equal bilaterally.  Distal pulses are equal and intact.  During flexion is noted that right paraspinous level was higher than the left.  This may be suggestive of some mild scoliosis.  Skin:     General: Skin is warm and dry.      Findings: No erythema.   Neurological:      Mental Status: He is alert and oriented to person, place, and time.         Assessment:       1. Encounter related to worker's compensation claim    2. Chronic midline low back pain without sciatica    3. 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  This has caused intermittent episodes of low back pain radiating to his left buttock and left leg.  He continues to prefer no surgical intervention at this time.  He will therefore continue with the warm soaks sizes previously prescribed.  He does not need a refill of his medications at this time.  Lidocaine still has not been approved by the insurance company but he will discuss with his organization to see if there is any necessary steps to get this approved as he finds that the use of the lidocaine patches with the other 2 medications provides the relief that he needs.       Patient Instructions: Daily home exercises/warm soaks   Restrictions: Regular Duty  Follow up in about 6 weeks (around 6/30/2022).

## 2022-05-19 NOTE — LETTER
[facility]  [address] [address]  Phone: [phone]  Fax: [phone]  [facility]: [id number]    Pt Name: Mehul Randhawa  Injury Date: 08/25/2017   Employee ID: [id number] Date of Treatment: 05/19/2022   Company: [employer]      Appointment Time: 09:15 AM Arrived: 9:15 AM   Provider: Sheldon Martínez, DO Time Out: 11:00 AM [facility]     Office Treatment:   1. Encounter related to worker's compensation claim    2. Chronic midline low back pain without sciatica    3. Strain of lumbar region, subsequent encounter          Patient Instructions: Daily home exercises/warm soaks      Restrictions: Regular Duty     Return Appointment: 5/30/2022 at 8:45 AM [facility]

## 2022-05-19 NOTE — LETTER
[facility]  [address] [address]  Phone: [phone]  Fax: [phone]  [facility]: [id number]    Pt Name: Mehul Randhawa  Injury Date: 08/25/2017   Employee ID: [id number] Date of Treatment: 05/19/2022   Company: [employer]      Appointment Time: 09:15 AM Arrived: 9:15 AM   Provider: Sheldon Martínez, DO Time Out: 11:00 AM [facility]     Office Treatment:   1. Encounter related to worker's compensation claim    2. Chronic midline low back pain without sciatica    3. Strain of lumbar region, subsequent encounter          Patient Instructions: Daily home exercises/warm soaks      Restrictions: Regular Duty     Return Appointment: 6/30/2022 at 8:45 AM [facility]

## 2022-06-30 ENCOUNTER — OFFICE VISIT (OUTPATIENT)
Dept: URGENT CARE | Facility: CLINIC | Age: 52
End: 2022-06-30
Payer: COMMERCIAL

## 2022-06-30 VITALS
HEIGHT: 62 IN | SYSTOLIC BLOOD PRESSURE: 116 MMHG | BODY MASS INDEX: 23 KG/M2 | OXYGEN SATURATION: 97 % | WEIGHT: 125 LBS | TEMPERATURE: 99 F | DIASTOLIC BLOOD PRESSURE: 72 MMHG | HEART RATE: 72 BPM

## 2022-06-30 DIAGNOSIS — G89.29 ACUTE EXACERBATION OF CHRONIC LOW BACK PAIN: ICD-10-CM

## 2022-06-30 DIAGNOSIS — M47.816 OSTEOARTHRITIS OF LUMBAR SPINE, UNSPECIFIED SPINAL OSTEOARTHRITIS COMPLICATION STATUS: ICD-10-CM

## 2022-06-30 DIAGNOSIS — Z02.6 ENCOUNTER RELATED TO WORKER'S COMPENSATION CLAIM: Primary | ICD-10-CM

## 2022-06-30 DIAGNOSIS — S39.012D STRAIN OF LUMBAR REGION, SUBSEQUENT ENCOUNTER: ICD-10-CM

## 2022-06-30 DIAGNOSIS — M54.50 CHRONIC MIDLINE LOW BACK PAIN WITHOUT SCIATICA: ICD-10-CM

## 2022-06-30 DIAGNOSIS — M54.50 ACUTE EXACERBATION OF CHRONIC LOW BACK PAIN: ICD-10-CM

## 2022-06-30 DIAGNOSIS — G89.29 CHRONIC MIDLINE LOW BACK PAIN WITHOUT SCIATICA: ICD-10-CM

## 2022-06-30 PROCEDURE — 99214 OFFICE O/P EST MOD 30 MIN: CPT | Mod: S$GLB,,,

## 2022-06-30 PROCEDURE — 99214 PR OFFICE/OUTPT VISIT, EST, LEVL IV, 30-39 MIN: ICD-10-PCS | Mod: S$GLB,,,

## 2022-06-30 RX ORDER — DICLOFENAC SODIUM 10 MG/G
2 GEL TOPICAL 4 TIMES DAILY
Qty: 150 G | Refills: 1 | Status: SHIPPED | OUTPATIENT
Start: 2022-06-30 | End: 2022-08-30 | Stop reason: SDUPTHER

## 2022-06-30 RX ORDER — NAPROXEN 500 MG/1
500 TABLET ORAL 2 TIMES DAILY WITH MEALS
Qty: 60 TABLET | Refills: 1 | Status: SHIPPED | OUTPATIENT
Start: 2022-06-30 | End: 2023-06-21 | Stop reason: SDUPTHER

## 2022-06-30 RX ORDER — TIZANIDINE 4 MG/1
4 TABLET ORAL NIGHTLY PRN
Qty: 20 TABLET | Refills: 1 | Status: SHIPPED | OUTPATIENT
Start: 2022-06-30 | End: 2022-08-09

## 2022-06-30 NOTE — LETTER
[facility]  [address] [address]  Phone: [phone]  Fax: [phone]  [facility]: [id number]    Pt Name: Mehul Randhawa  Injury Date: 08/25/2017   Employee ID:  Date of First Treatment: 06/30/2022   Company: [employer]      Appointment Time: 08:30 AM Arrived: 08:44 am   Provider: Sheldon Martínez, DO Time Out: 9:55 AM       Office Treatment:   1. Encounter related to worker's compensation claim    2. Chronic midline low back pain without sciatica    3. Osteoarthritis of lumbar spine, unspecified spinal osteoarthritis complication status    4. Strain of lumbar region, subsequent encounter    5. Acute exacerbation of chronic low back pain      Medications Ordered This Encounter   Medications    diclofenac sodium (VOLTAREN) 1 % Gel    naproxen (NAPROSYN) 500 MG tablet    tiZANidine (ZANAFLEX) 4 MG tablet           Restrictions: Regular Duty     Return Appointment: 8/30/2022 at 09:00 am  giuliano

## 2022-06-30 NOTE — PROGRESS NOTES
Subjective:       Patient ID: Mehul Randhawa is a 51 y.o. male.    Chief Complaint: Back Pain    See MA note.  Follow-up evaluation regarding his chronic lower back pain.  Overall his back pain has been stable over the past few months with occasional flare-up that is controlled with the medication.  He feels the left lower extremity slightly weaker than the right.  He is requesting a refill of his naproxen, Zanaflex, and lidocaine patches.     WC, RV (DOI 8/25/17) patient works for [employer]. He returns today regarding his chronic lower pain.  Continues of intermittent lower back symptoms that is controlled with the use naproxen, a muscle relaxer and excercises. He also wears the back brace daily. No worsening in his condition over past 6 weeks. Back pain 1/10, Spasms 2/10. LRC        Constitution: Negative.   HENT: Negative.    Neck: neck negative.   Eyes: Negative.    Respiratory: Negative.    Gastrointestinal: Negative.    Genitourinary: Negative.    Musculoskeletal: Positive for back pain.   Skin: Negative.    Allergic/Immunologic: Negative.    Neurological: Negative.         Objective:      Physical Exam  Vitals and nursing note reviewed.   HENT:      Head: Normocephalic.   Eyes:      Conjunctiva/sclera: Conjunctivae normal.   Musculoskeletal:      Cervical back: Normal range of motion.      Lumbar back: Tenderness present. No swelling, deformity or spasms. Normal range of motion. Negative right straight leg raise test and negative left straight leg raise test.        Back:       Comments: No gross deformity to the lower lumbar spine noted.  Pain to the L5-S1 region mid extension activities.  Otherwise full range of motion lumbar spine. 4/5 LLE and 5/5 RLE strength.  Symmetrical lower extremity reflexes bilaterally.  His gait is normal.   Skin:     General: Skin is warm.      Capillary Refill: Capillary refill takes less than 2 seconds.   Neurological:      General: No focal deficit present.      Mental Status: He  is alert and oriented to person, place, and time.      Deep Tendon Reflexes: Reflexes are normal and symmetric.   Psychiatric:         Attention and Perception: Attention normal.         Mood and Affect: Mood and affect normal.         Speech: Speech normal.         Behavior: Behavior normal. Behavior is cooperative.         Assessment:       1. Encounter related to worker's compensation claim    2. Chronic midline low back pain without sciatica    3. Osteoarthritis of lumbar spine, unspecified spinal osteoarthritis complication status    4. Strain of lumbar region, subsequent encounter    5. Acute exacerbation of chronic low back pain        Plan:         Medications Ordered This Encounter   Medications    diclofenac sodium (VOLTAREN) 1 % Gel     Sig: Apply 2 g topically 4 (four) times daily.     Dispense:  150 g     Refill:  1    naproxen (NAPROSYN) 500 MG tablet     Sig: Take 1 tablet (500 mg total) by mouth 2 (two) times daily with meals.     Dispense:  60 tablet     Refill:  1    tiZANidine (ZANAFLEX) 4 MG tablet     Sig: Take 1 tablet (4 mg total) by mouth nightly as needed (pain/spasm).     Dispense:  20 tablet     Refill:  1   Chronic low back pain is stable.  He is able to perform the duties of his role at this time.  He is aware to contact my office or return for visit should his symptoms worsen unexpectedly.  Otherwise follow-up in 2 months.      Restrictions: Regular Duty  No follow-ups on file.

## 2022-08-30 ENCOUNTER — OFFICE VISIT (OUTPATIENT)
Dept: URGENT CARE | Facility: CLINIC | Age: 52
End: 2022-08-30
Payer: COMMERCIAL

## 2022-08-30 VITALS
WEIGHT: 125 LBS | OXYGEN SATURATION: 97 % | RESPIRATION RATE: 18 BRPM | BODY MASS INDEX: 21.34 KG/M2 | TEMPERATURE: 98 F | HEART RATE: 87 BPM | SYSTOLIC BLOOD PRESSURE: 119 MMHG | HEIGHT: 64 IN | DIASTOLIC BLOOD PRESSURE: 77 MMHG

## 2022-08-30 DIAGNOSIS — M47.816 OSTEOARTHRITIS OF LUMBAR SPINE, UNSPECIFIED SPINAL OSTEOARTHRITIS COMPLICATION STATUS: ICD-10-CM

## 2022-08-30 DIAGNOSIS — G89.29 CHRONIC MIDLINE LOW BACK PAIN WITHOUT SCIATICA: Primary | ICD-10-CM

## 2022-08-30 DIAGNOSIS — S39.012D STRAIN OF LUMBAR REGION, SUBSEQUENT ENCOUNTER: ICD-10-CM

## 2022-08-30 DIAGNOSIS — M54.50 ACUTE EXACERBATION OF CHRONIC LOW BACK PAIN: ICD-10-CM

## 2022-08-30 DIAGNOSIS — M54.50 CHRONIC MIDLINE LOW BACK PAIN WITHOUT SCIATICA: Primary | ICD-10-CM

## 2022-08-30 DIAGNOSIS — G89.29 ACUTE EXACERBATION OF CHRONIC LOW BACK PAIN: ICD-10-CM

## 2022-08-30 PROCEDURE — 99214 PR OFFICE/OUTPT VISIT, EST, LEVL IV, 30-39 MIN: ICD-10-PCS | Mod: S$GLB,,,

## 2022-08-30 PROCEDURE — 99214 OFFICE O/P EST MOD 30 MIN: CPT | Mod: S$GLB,,,

## 2022-08-30 RX ORDER — NAPROXEN 500 MG/1
500 TABLET ORAL 2 TIMES DAILY
Qty: 30 TABLET | Refills: 1 | Status: SHIPPED | OUTPATIENT
Start: 2022-08-30 | End: 2023-02-22 | Stop reason: SDUPTHER

## 2022-08-30 RX ORDER — TIZANIDINE 4 MG/1
4 TABLET ORAL NIGHTLY PRN
Qty: 10 TABLET | Refills: 0 | Status: SHIPPED | OUTPATIENT
Start: 2022-08-30 | End: 2022-09-09

## 2022-08-30 RX ORDER — DICLOFENAC SODIUM 10 MG/G
2 GEL TOPICAL 4 TIMES DAILY
Qty: 150 G | Refills: 1 | Status: SHIPPED | OUTPATIENT
Start: 2022-08-30 | End: 2022-10-28 | Stop reason: SDUPTHER

## 2022-08-30 NOTE — PROGRESS NOTES
Subjective:       Patient ID: Mehul Randhawa is a 52 y.o. male.    Chief Complaint: No chief complaint on file.    WC, RV (DOI 8/25/17) patient works for [employer] He returns today regarding his chronic lower back and hips pain.  Continues of intermittent lower back symptoms that is controlled with the use naproxen, a muscle relaxer and excercises. He also wears the back brace daily. He States that every other day he has pain some days worst then others . When he sitting it travel down left leg he States that it gets numb and stiff . He also is doing HEP and hot water on his back .Back pain 4-5/10, Spasms 3/10. LM      Constitution: Positive for activity change.   HENT: Negative.     Neck: neck negative.   Eyes: Negative.    Respiratory: Negative.     Gastrointestinal: Negative.    Genitourinary: Negative.    Musculoskeletal:  Positive for pain, joint pain, abnormal ROM of joint, back pain and pain with walking. Negative for trauma and joint swelling.   Skin: Negative.    Allergic/Immunologic: Negative.    Neurological: Negative.    Psychiatric/Behavioral:  Negative for sleep disturbance.       Objective:      Physical Exam  Vitals and nursing note reviewed.   Constitutional:       Appearance: He is normal weight.   HENT:      Head: Normocephalic.   Eyes:      Conjunctiva/sclera: Conjunctivae normal.   Musculoskeletal:      Cervical back: Normal range of motion.      Lumbar back: Tenderness present. No swelling or deformity. Decreased range of motion. Negative right straight leg raise test and negative left straight leg raise test.        Back:       Comments: No gross deformity to the lumbar spine.  Pain to approximately the L5-S1 level midline on palpation and during range of motion assessment.  Forward flexion fingertips just distal to the patella.  He is able to squat difficulty but this does elicit pain to his lower back.    Skin:     General: Skin is warm.      Capillary Refill: Capillary refill takes less than 2  seconds.   Neurological:      General: No focal deficit present.      Mental Status: He is alert and oriented to person, place, and time.      Deep Tendon Reflexes: Reflexes are normal and symmetric.   Psychiatric:         Attention and Perception: Attention normal.         Mood and Affect: Mood and affect normal.         Speech: Speech normal.         Behavior: Behavior normal. Behavior is cooperative.       Assessment:       1. Chronic midline low back pain without sciatica    2. Acute exacerbation of chronic low back pain    3. Osteoarthritis of lumbar spine, unspecified spinal osteoarthritis complication status    4. Strain of lumbar region, subsequent encounter        Plan:       Follow-up evaluation of chronic lower back pain.  He has been stable on the current medication regimen and refills will be provided today.  Recheck in approximately 2 months.  Medications Ordered This Encounter   Medications    diclofenac sodium (VOLTAREN) 1 % Gel     Sig: Apply 2 g topically 4 (four) times daily.     Dispense:  150 g     Refill:  1    naproxen (NAPROSYN) 500 MG tablet     Sig: Take 1 tablet (500 mg total) by mouth 2 (two) times daily.     Dispense:  30 tablet     Refill:  1    tiZANidine (ZANAFLEX) 4 MG tablet     Sig: Take 1 tablet (4 mg total) by mouth nightly as needed (pain/spasm).     Dispense:  10 tablet     Refill:  0         Restrictions: Regular Duty  Follow up in about 2 months (around 10/30/2022).

## 2022-08-30 NOTE — LETTER
[facility]  [address] [address]  Phone: [phone]  Fax: [phone]  [facility]: [id number]    Pt Name: Mehul Randhawa  Injury Date: 08/25/2017   Employee ID: [id number] Date of First Treatment: 08/30/2022   Company: [employer]      Appointment Time: 08:45 AM Arrived: 8:59am   Provider: Sheldon Martínez, DO Time Out: 10:30am     Office Treatment:   1. Chronic midline low back pain without sciatica    2. Acute exacerbation of chronic low back pain    3. Osteoarthritis of lumbar spine, unspecified spinal osteoarthritis complication status    4. Strain of lumbar region, subsequent encounter      Medications Ordered This Encounter   Medications    diclofenac sodium (VOLTAREN) 1 % Gel    naproxen (NAPROSYN) 500 MG tablet    tiZANidine (ZANAFLEX) 4 MG tablet           Restrictions: Regular Duty     Return Appointment: 10/28/2022 at 9:00am

## 2022-10-28 ENCOUNTER — OFFICE VISIT (OUTPATIENT)
Dept: URGENT CARE | Facility: CLINIC | Age: 52
End: 2022-10-28
Payer: COMMERCIAL

## 2022-10-28 VITALS
BODY MASS INDEX: 22.2 KG/M2 | DIASTOLIC BLOOD PRESSURE: 76 MMHG | HEIGHT: 64 IN | RESPIRATION RATE: 18 BRPM | HEART RATE: 65 BPM | SYSTOLIC BLOOD PRESSURE: 122 MMHG | TEMPERATURE: 99 F | WEIGHT: 130 LBS | OXYGEN SATURATION: 96 %

## 2022-10-28 DIAGNOSIS — M54.50 CHRONIC MIDLINE LOW BACK PAIN WITHOUT SCIATICA: ICD-10-CM

## 2022-10-28 DIAGNOSIS — Z02.6 ENCOUNTER RELATED TO WORKER'S COMPENSATION CLAIM: Primary | ICD-10-CM

## 2022-10-28 DIAGNOSIS — G89.29 CHRONIC MIDLINE LOW BACK PAIN WITHOUT SCIATICA: ICD-10-CM

## 2022-10-28 PROCEDURE — 99214 OFFICE O/P EST MOD 30 MIN: CPT | Mod: S$GLB,,,

## 2022-10-28 PROCEDURE — 99214 PR OFFICE/OUTPT VISIT, EST, LEVL IV, 30-39 MIN: ICD-10-PCS | Mod: S$GLB,,,

## 2022-10-28 RX ORDER — DICLOFENAC SODIUM 10 MG/G
2 GEL TOPICAL 4 TIMES DAILY
Qty: 150 G | Refills: 1 | Status: SHIPPED | OUTPATIENT
Start: 2022-10-28 | End: 2023-02-22 | Stop reason: SDUPTHER

## 2022-10-28 NOTE — PROGRESS NOTES
Subjective:       Patient ID: Mehul Randhawa is a 52 y.o. male.    Chief Complaint: Back Pain (low)    WC, RV (DOI 8/25/17) patient works for [employer] - . He returns today regarding his chronic lower back and hips pain.  Patient said some days are worse than others. He is still having his pain. He takes his meds. He is doing his HEP no PT at this time. Pain 2/10. J    Back Pain  Pertinent negatives include no numbness.     Constitution: Positive for activity change.   HENT: Negative.     Neck: neck negative.   Eyes: Negative.    Respiratory: Negative.     Gastrointestinal: Negative.    Genitourinary: Negative.    Musculoskeletal:  Positive for pain, abnormal ROM of joint, back pain, pain with walking and muscle ache. Negative for trauma, joint pain, joint swelling and muscle cramps.   Skin: Negative.    Allergic/Immunologic: Negative.    Neurological: Negative.  Negative for numbness and tingling.   Psychiatric/Behavioral:  Negative for sleep disturbance.       Objective:      Physical Exam  Vitals and nursing note reviewed.   Constitutional:       General: He is not in acute distress.  HENT:      Head: Normocephalic.   Eyes:      Conjunctiva/sclera: Conjunctivae normal.   Musculoskeletal:      Cervical back: Normal range of motion.      Lumbar back: No swelling, deformity, spasms or tenderness. Normal range of motion. Negative right straight leg raise test and negative left straight leg raise test.        Back:       Comments: No gross deformity noted to the lumbar spine.  No pain on palpation and no palpable spasms.  Pain to the L5 midline with extension only.  Otherwise he has full range of motion in all planes.  He is able to climb on and off the examination table without difficulty or assistance.  5/5 lower extremity strength and symmetrical reflexes bilaterally.   Skin:     General: Skin is warm.      Capillary Refill: Capillary refill takes less than 2 seconds.   Neurological:      General: No  focal deficit present.      Mental Status: He is alert and oriented to person, place, and time.      Gait: Gait is intact.      Deep Tendon Reflexes: Reflexes are normal and symmetric.   Psychiatric:         Attention and Perception: Attention normal.         Mood and Affect: Mood and affect normal.         Speech: Speech normal.         Behavior: Behavior normal. Behavior is cooperative.       Assessment:       1. Encounter related to worker's compensation claim    2. Chronic midline low back pain without sciatica          Plan:         His lower back condition is stable with home exercises and with use of the current medication regimen.  Does ask for refill of the Voltaren gel.  Periodically he has some flare-up of his symptoms but he is able to manage at this time.  Follow-up in approximately 2 months but he is aware to come in sooner should he have a flare up that does not improve this current medication regimen.    Medications Ordered This Encounter   Medications    diclofenac sodium (VOLTAREN) 1 % Gel     Sig: Apply 2 g topically 4 (four) times daily.     Dispense:  150 g     Refill:  1     Patient Instructions: Daily home exercises/warm soaks   Restrictions: Regular Duty  Follow up in about 8 weeks (around 12/21/2022).

## 2022-10-28 NOTE — LETTER
[facility]  [address] [address]  Phone: [phone]  Fax: [phone]  [facility]: [id number]    Pt Name: Mehul Randhawa  Injury Date: 08/25/2017   Employee ID: [id number] Date of Treatment: 10/28/2022   Company: [employer]      Appointment Time: 09:00 AM Arrived: 8:59 AM    Provider: Sheldon Martínez DO Time Out: 10:23 AM      Office Treatment:   1. Encounter related to worker's compensation claim    2. Chronic midline low back pain without sciatica      Medications Ordered This Encounter   Medications    diclofenac sodium (VOLTAREN) 1 % Gel      Patient Instructions: Daily home exercises/warm soaks      Restrictions: Regular Duty     Return Appointment: 12/21/2022 at 9:00 AM [facility]

## 2022-12-20 ENCOUNTER — OFFICE VISIT (OUTPATIENT)
Dept: URGENT CARE | Facility: CLINIC | Age: 52
End: 2022-12-20
Payer: COMMERCIAL

## 2022-12-20 VITALS
HEIGHT: 64 IN | HEART RATE: 71 BPM | DIASTOLIC BLOOD PRESSURE: 86 MMHG | WEIGHT: 130 LBS | SYSTOLIC BLOOD PRESSURE: 148 MMHG | RESPIRATION RATE: 20 BRPM | TEMPERATURE: 98 F | OXYGEN SATURATION: 98 % | BODY MASS INDEX: 22.2 KG/M2

## 2022-12-20 DIAGNOSIS — R05.9 COUGH, UNSPECIFIED TYPE: ICD-10-CM

## 2022-12-20 DIAGNOSIS — J06.9 UPPER RESPIRATORY TRACT INFECTION, UNSPECIFIED TYPE: Primary | ICD-10-CM

## 2022-12-20 LAB
CTP QC/QA: YES
SARS-COV-2 AG RESP QL IA.RAPID: NEGATIVE

## 2022-12-20 PROCEDURE — 3077F PR MOST RECENT SYSTOLIC BLOOD PRESSURE >= 140 MM HG: ICD-10-PCS | Mod: CPTII,S$GLB,,

## 2022-12-20 PROCEDURE — 1159F MED LIST DOCD IN RCRD: CPT | Mod: CPTII,S$GLB,,

## 2022-12-20 PROCEDURE — 1160F RVW MEDS BY RX/DR IN RCRD: CPT | Mod: CPTII,S$GLB,,

## 2022-12-20 PROCEDURE — 3079F PR MOST RECENT DIASTOLIC BLOOD PRESSURE 80-89 MM HG: ICD-10-PCS | Mod: CPTII,S$GLB,,

## 2022-12-20 PROCEDURE — 3079F DIAST BP 80-89 MM HG: CPT | Mod: CPTII,S$GLB,,

## 2022-12-20 PROCEDURE — 3077F SYST BP >= 140 MM HG: CPT | Mod: CPTII,S$GLB,,

## 2022-12-20 PROCEDURE — 1160F PR REVIEW ALL MEDS BY PRESCRIBER/CLIN PHARMACIST DOCUMENTED: ICD-10-PCS | Mod: CPTII,S$GLB,,

## 2022-12-20 PROCEDURE — 99213 PR OFFICE/OUTPT VISIT, EST, LEVL III, 20-29 MIN: ICD-10-PCS | Mod: S$GLB,,,

## 2022-12-20 PROCEDURE — 3008F PR BODY MASS INDEX (BMI) DOCUMENTED: ICD-10-PCS | Mod: CPTII,S$GLB,,

## 2022-12-20 PROCEDURE — 99213 OFFICE O/P EST LOW 20 MIN: CPT | Mod: S$GLB,,,

## 2022-12-20 PROCEDURE — 3008F BODY MASS INDEX DOCD: CPT | Mod: CPTII,S$GLB,,

## 2022-12-20 PROCEDURE — 87811 SARS CORONAVIRUS 2 ANTIGEN POCT, MANUAL READ: ICD-10-PCS | Mod: QW,S$GLB,,

## 2022-12-20 PROCEDURE — 1159F PR MEDICATION LIST DOCUMENTED IN MEDICAL RECORD: ICD-10-PCS | Mod: CPTII,S$GLB,,

## 2022-12-20 PROCEDURE — 87811 SARS-COV-2 COVID19 W/OPTIC: CPT | Mod: QW,S$GLB,,

## 2022-12-20 RX ORDER — BENZONATATE 200 MG/1
200 CAPSULE ORAL 3 TIMES DAILY PRN
Qty: 30 CAPSULE | Refills: 0 | Status: SHIPPED | OUTPATIENT
Start: 2022-12-20 | End: 2022-12-30

## 2022-12-20 RX ORDER — PROMETHAZINE HYDROCHLORIDE AND DEXTROMETHORPHAN HYDROBROMIDE 6.25; 15 MG/5ML; MG/5ML
5 SYRUP ORAL EVERY 6 HOURS PRN
Qty: 118 ML | Refills: 0 | Status: SHIPPED | OUTPATIENT
Start: 2022-12-20 | End: 2022-12-30

## 2022-12-20 NOTE — PROGRESS NOTES
"Subjective:       Patient ID: Mehul aRndhawa is a 52 y.o. male.    Vitals:  height is 5' 4" (1.626 m) and weight is 59 kg (130 lb). His oral temperature is 98.2 °F (36.8 °C). His blood pressure is 148/86 (abnormal) and his pulse is 71. His respiration is 20 and oxygen saturation is 98%.     Chief Complaint: Nasal Congestion    53 yo male presents to the urgent care with c/o productive cough (yellow). Patient states that symptoms started 2 weeks ago. Associated symptoms include rhinorrhea, nasal congestion. Patient has tried Cold/Flu medication with mild relief. Patient denies CP, SOB, fever, chills.       Cough  This is a new problem. The current episode started 1 to 4 weeks ago. The problem has been unchanged. The problem occurs constantly. The cough is Productive of sputum. Associated symptoms include nasal congestion, postnasal drip and rhinorrhea. Pertinent negatives include no chest pain, chills, fever, hemoptysis, myalgias, rash or shortness of breath. Nothing aggravates the symptoms. He has tried OTC cough suppressant for the symptoms. The treatment provided mild relief.     Constitution: Negative for chills, fatigue and fever.   HENT:  Positive for congestion, postnasal drip and sinus pressure.    Cardiovascular:  Negative for chest pain and sob on exertion.   Respiratory:  Positive for cough and sputum production (in the morning, yellow in color). Negative for bloody sputum and shortness of breath.    Gastrointestinal:  Negative for abdominal pain, nausea, vomiting and diarrhea.   Musculoskeletal:  Negative for muscle ache.   Skin:  Negative for rash.     Objective:      Physical Exam   Constitutional:  Non-toxic appearance. He does not appear ill. No distress. normal  HENT:   Head: Normocephalic.   Ears:   Right Ear: Tympanic membrane, external ear and ear canal normal. impacted cerumen  Left Ear: Tympanic membrane, external ear and ear canal normal. impacted cerumen  Nose: Congestion present. No rhinorrhea. "   Mouth/Throat: Posterior oropharyngeal erythema present. No oropharyngeal exudate.   Eyes: Conjunctivae are normal. Right eye exhibits no discharge. Left eye exhibits no discharge. No scleral icterus. Extraocular movement intact   Neck: No neck rigidity present.   Cardiovascular: Normal rate, regular rhythm and normal heart sounds.   No murmur heard.Exam reveals no gallop and no friction rub.   Pulmonary/Chest: Effort normal and breath sounds normal. No stridor. No respiratory distress. He has no wheezes. He has no rhonchi. He has no rales. He exhibits no tenderness.   Abdominal: Normal appearance.   Musculoskeletal:      Cervical back: He exhibits no tenderness.   Lymphadenopathy:     He has cervical adenopathy.   Neurological: He is alert.   Skin: Skin is not diaphoretic and no rash.   Nursing note and vitals reviewed.      Results for orders placed or performed in visit on 12/20/22   SARS Coronavirus 2 Antigen, POCT Manual Read   Result Value Ref Range    SARS Coronavirus 2 Antigen Negative Negative     Acceptable Yes      Assessment:       1. Upper respiratory tract infection, unspecified type    2. Cough, unspecified type          Plan:     Previous external notes reviewed.  Vital signs reviewed. /86.  Labs ordered. Labs reviewed. COVID NEGATIVE.  Discussed URI, home care, tx options, and given follow up precautions.  I have considered potential emergent or life threatening causes of the patient's compliant including but not limited to PNA. Based on the patient's history, exam, and diagnostic findings, I think these diagnoses to be highly unlikely at the present time.   Patient was briefed on my thought process and diagnosis.   Patient involved with the treatment plan and agreed to the plan.  Patient informed on warning signs, patient understood warning signs.  Patient informed to return to the urgent care or go to the ER if warning signs appear.    Patient Instructions   Please drink  plenty of fluids.  Please get plenty of rest.  Please utilize saltwater gargles for sore throat.  Please utilize warm tea, and lemon for sore throat relief.  Please utilize over-the-counter throat numbing spray and lozenges for sore throat relief.  Please return here or go to the Emergency Department for any concerns or worsening of condition.  Please take TESSALON during the day for cough.  Please take PROMETHAZINE DM at night for cough.   You were prescribed Promethazine DM, this medication can make you drowsy, please avoid driving or operating heavy machinery while taking this medication.   If you do not have Hypertension or any history of palpitations, it is ok to take over the counter Sudafed or Mucinex D or Allegra-D or Claritin-D or Zyrtec-D.  If you do take one of the above, it is ok to combine that with plain over the counter Mucinex or Allegra or Claritin or Zyrtec.  If for example you are taking Zyrtec -D, you can combine that with Mucinex, but not Mucinex-D.  If you are taking Mucinex-D, you can combine that with plain Allegra or Claritin or Zyrtec.   If you do have Hypertension or palpitations, it is safe to take Coricidin HBP for relief of sinus symptoms.  We recommend you take over the counter Flonase (Fluticasone) or another nasally inhaled steroid unless you are already taking one.  Nasal irrigation with a saline spray or Netti Pot like device per their directions is also recommended.  If not allergic, please take over the counter Tylenol (Acetaminophen) and/or Motrin (Ibuprofen) as directed for control of pain and/or fever.  Please follow up with your primary care doctor or specialist as needed.    If you  smoke, please stop smoking.    Upper respiratory tract infection, unspecified type  -     benzonatate (TESSALON) 200 MG capsule; Take 1 capsule (200 mg total) by mouth 3 (three) times daily as needed for Cough.  Dispense: 30 capsule; Refill: 0  -     promethazine-dextromethorphan  (PROMETHAZINE-DM) 6.25-15 mg/5 mL Syrp; Take 5 mLs by mouth every 6 (six) hours as needed (cough).  Dispense: 118 mL; Refill: 0    Cough, unspecified type  -     SARS Coronavirus 2 Antigen, POCT Manual Read      Tomas Puentes PA-C

## 2022-12-20 NOTE — LETTER
[address] [facility], [address] ? [place] [address] ? Phone [phone] ? Fax [phone]           Return to Work/School    Patient: Mehul Randhawa  YOB: 1970   Date: 12/20/2022      To Whom It May Concern:     Mehul Randhawa was in contact with/seen in my office on 12/20/2022. COVID-19 is present in our communities across the [facility]. Not all patients are eligible or appropriate to be tested. In this situation, your employee meets the following criteria:     Mehul Randhawa has met the criteria for COVID-19 testing and has a NEGATIVE result. The employee can return to work tomorrow as long as he is without fever without the use of any fever reducing medications such as Tylenol, Ibuprofen, Motrin, etc.     If you have any questions or concerns, or if I can be of further assistance, please do not hesitate to contact me.     Sincerely,    Tomas Puentes PA-C

## 2022-12-21 ENCOUNTER — OFFICE VISIT (OUTPATIENT)
Dept: URGENT CARE | Facility: CLINIC | Age: 52
End: 2022-12-21
Payer: COMMERCIAL

## 2022-12-21 VITALS
HEIGHT: 64 IN | HEART RATE: 66 BPM | TEMPERATURE: 99 F | SYSTOLIC BLOOD PRESSURE: 130 MMHG | DIASTOLIC BLOOD PRESSURE: 85 MMHG | WEIGHT: 130 LBS | OXYGEN SATURATION: 100 % | BODY MASS INDEX: 22.2 KG/M2

## 2022-12-21 DIAGNOSIS — G89.29 CHRONIC MIDLINE LOW BACK PAIN WITHOUT SCIATICA: ICD-10-CM

## 2022-12-21 DIAGNOSIS — M54.50 CHRONIC MIDLINE LOW BACK PAIN WITHOUT SCIATICA: ICD-10-CM

## 2022-12-21 DIAGNOSIS — Z02.6 ENCOUNTER RELATED TO WORKER'S COMPENSATION CLAIM: Primary | ICD-10-CM

## 2022-12-21 PROCEDURE — 99214 OFFICE O/P EST MOD 30 MIN: CPT | Mod: S$GLB,,,

## 2022-12-21 PROCEDURE — 99214 PR OFFICE/OUTPT VISIT, EST, LEVL IV, 30-39 MIN: ICD-10-PCS | Mod: S$GLB,,,

## 2022-12-21 NOTE — LETTER
[facility]  [address] [address]  Phone: [phone]  Fax: [phone]  [facility]: [id number]    Pt Name: Mehul Randhawa  Injury Date: 08/25/2017   Employee ID: [id number] Date of Treatment: 12/21/2022   Company: [employer]      Appointment Time: 9:00am Arrived: 8:49am   Provider: Sheldon Martínez,  Time Out: 10:00am.     Office Treatment:   1. Encounter related to worker's compensation claim    2. Chronic midline low back pain without sciatica               Restrictions: Regular Duty     Return Appointment: 2/21/2023 at 9:00am.  EC

## 2022-12-21 NOTE — PROGRESS NOTES
Subjective:       Patient ID: Mehul Randhawa is a 52 y.o. male.    Chief Complaint: Back Pain    RV Back (DOI 8/25/17) He works for [employer] as an . He states that his pain level is a 22/10. He states that his pain only gets back when he makes certain movements or when bends down too much. He states that some days are worse than other. She states that last Sunday he started having spasms that lasted 2 days. He continues to take his medication. He is only doing home exercise.    EC     Back Pain  Pertinent negatives include no numbness.     Constitution: Negative.   HENT: Negative.     Neck: neck negative. Negative for neck pain and neck stiffness.   Cardiovascular: Negative.    Eyes: Negative.    Respiratory: Negative.     Gastrointestinal: Negative.    Endocrine: negative.   Genitourinary: Negative.    Musculoskeletal:  Positive for pain, back pain and muscle ache. Negative for pain with walking.   Skin: Negative.    Neurological:  Negative for loss of balance, numbness and tingling.   Psychiatric/Behavioral:  Negative for sleep disturbance.       Objective:      Physical Exam  Vitals and nursing note reviewed.   Constitutional:       General: He is not in acute distress.  HENT:      Head: Normocephalic.   Eyes:      General: Lids are normal.      Conjunctiva/sclera: Conjunctivae normal.   Musculoskeletal:      Cervical back: No pain with movement.      Lumbar back: Spasms and tenderness present. No swelling or deformity. Normal range of motion. Negative right straight leg raise test and negative left straight leg raise test.        Back:       Comments: No gross deformity noted to lumbar spine.  Mild tenderness and spasm on palpation located to the left upper buttock area just the left of midline.  Otherwise he has full range of motion of the lumbar spine.  He is able to climb on and off the examination table without difficulty or assistance.   Skin:     General: Skin is warm.      Capillary Refill:  Capillary refill takes less than 2 seconds.   Neurological:      General: No focal deficit present.      Mental Status: He is alert and oriented to person, place, and time.      Deep Tendon Reflexes: Reflexes are normal and symmetric.   Psychiatric:         Attention and Perception: Attention normal.         Mood and Affect: Mood and affect normal.         Speech: Speech normal.         Behavior: Behavior normal. Behavior is cooperative.       Assessment:       1. Encounter related to worker's compensation claim    2. Chronic midline low back pain without sciatica          Plan:       Mehul has chronic lower back pain that is well managed with medications.  Is intermittent aggravation of his symptoms and recently had increased spasms to the left lower buttock.  This is not inhibited his work activities.  He has been able to perform his regular duties despite ongoing symptoms.  Continue to manage with medications and I have asked him perform his home exercises on a regular basis.  He states no need for refills at this time re-evaluate in 2 months but he may return sooner should his condition unexpectedly worsen.         Restrictions: Regular Duty  Follow up in about 2 months (around 2/21/2023).

## 2023-02-22 ENCOUNTER — OFFICE VISIT (OUTPATIENT)
Dept: URGENT CARE | Facility: CLINIC | Age: 53
End: 2023-02-22
Payer: COMMERCIAL

## 2023-02-22 VITALS
SYSTOLIC BLOOD PRESSURE: 140 MMHG | WEIGHT: 130 LBS | HEIGHT: 64 IN | DIASTOLIC BLOOD PRESSURE: 88 MMHG | OXYGEN SATURATION: 98 % | RESPIRATION RATE: 18 BRPM | HEART RATE: 74 BPM | TEMPERATURE: 98 F | BODY MASS INDEX: 22.2 KG/M2

## 2023-02-22 DIAGNOSIS — G89.29 ACUTE EXACERBATION OF CHRONIC LOW BACK PAIN: ICD-10-CM

## 2023-02-22 DIAGNOSIS — G89.29 CHRONIC MIDLINE LOW BACK PAIN WITHOUT SCIATICA: ICD-10-CM

## 2023-02-22 DIAGNOSIS — S39.012D STRAIN OF LUMBAR REGION, SUBSEQUENT ENCOUNTER: ICD-10-CM

## 2023-02-22 DIAGNOSIS — M54.50 ACUTE EXACERBATION OF CHRONIC LOW BACK PAIN: ICD-10-CM

## 2023-02-22 DIAGNOSIS — Z02.6 ENCOUNTER RELATED TO WORKER'S COMPENSATION CLAIM: Primary | ICD-10-CM

## 2023-02-22 DIAGNOSIS — M54.50 CHRONIC MIDLINE LOW BACK PAIN WITHOUT SCIATICA: ICD-10-CM

## 2023-02-22 PROCEDURE — 99213 PR OFFICE/OUTPT VISIT, EST, LEVL III, 20-29 MIN: ICD-10-PCS | Mod: S$GLB,,,

## 2023-02-22 PROCEDURE — 99213 OFFICE O/P EST LOW 20 MIN: CPT | Mod: S$GLB,,,

## 2023-02-22 RX ORDER — TIZANIDINE 4 MG/1
TABLET ORAL
Qty: 20 TABLET | Refills: 0 | Status: SHIPPED | OUTPATIENT
Start: 2023-02-22 | End: 2023-04-21 | Stop reason: SDUPTHER

## 2023-02-22 RX ORDER — NAPROXEN 500 MG/1
500 TABLET ORAL 2 TIMES DAILY
Qty: 30 TABLET | Refills: 1 | Status: SHIPPED | OUTPATIENT
Start: 2023-02-22 | End: 2023-10-27 | Stop reason: SDUPTHER

## 2023-02-22 RX ORDER — DICLOFENAC SODIUM 10 MG/G
2 GEL TOPICAL 4 TIMES DAILY
Qty: 150 G | Refills: 1 | Status: SHIPPED | OUTPATIENT
Start: 2023-02-22 | End: 2023-06-21 | Stop reason: SDUPTHER

## 2023-02-22 NOTE — LETTER
[facility]  [address] [address]  Phone: [phone]  Fax: [phone]  [facility]: [id number]    Pt Name: Mehul Randhawa  Injury Date: 08/25/2017   Employee ID: [id number] Date of Treatment: 02/22/2023   Company: [employer]      Appointment Time: 08:45 AM Arrived: 8:58 AM   Provider: Sheldon Martínez, DO Time Out: 10:02 AM     Office Treatment:   1. Encounter related to worker's compensation claim    2. Chronic midline low back pain without sciatica    3. Acute exacerbation of chronic low back pain    4. Strain of lumbar region, subsequent encounter      Medications Ordered This Encounter   Medications    diclofenac sodium (VOLTAREN) 1 % Gel    naproxen (NAPROSYN) 500 MG tablet    tiZANidine (ZANAFLEX) 4 MG tablet           Restrictions: Regular Duty     Return Appointment: 4/21/2023 at 9:15 AM

## 2023-02-22 NOTE — PROGRESS NOTES
Subjective:       Patient ID: Mehul Randhawa is a 52 y.o. male.    Chief Complaint: Back Pain    See MA note.  He is chronic lower back pain with intermittent aggravation of symptoms.  Symptoms improve with Epsom salt soaks, home exercises, and the use of his medications.  He has been stable on this treatment regimen for many months and no long-lasting worsening of his lower back symptoms.  Still experiencing occasional spasm to the left lower buttock area.     Follow-up of Back Pain ( DOI 08- ) Works for [employer]. Pain score 3/10 with complaints of Constant Stabbing pain, Spasm of LT Buttock, ROM impaired, Intermittent Numbness/Tingling of LT Leg, Pain with walking. Taking Naprosyn, ZanaFlex, Voltaren gel. SH    Back Pain  Associated symptoms include numbness.   Constitution: Negative.   HENT: Negative.     Neck: neck negative.   Cardiovascular: Negative.    Eyes: Negative.    Respiratory: Negative.     Gastrointestinal: Negative.    Endocrine: negative.   Genitourinary: Negative.    Musculoskeletal:  Positive for pain, joint pain and back pain. Negative for trauma, joint swelling and muscle ache.   Skin: Negative.    Neurological:  Positive for numbness and tingling.      Objective:      Physical Exam  Vitals and nursing note reviewed.   Constitutional:       General: He is not in acute distress.  HENT:      Head: Normocephalic.   Eyes:      Conjunctiva/sclera: Conjunctivae normal.   Musculoskeletal:      Cervical back: No pain with movement.      Lumbar back: Tenderness present. No swelling or deformity. Decreased range of motion.        Back:       Comments: No gross deformity noted to lumbar spine.  Mild tenderness on palpation to the L5-S1 area centrally.  Pain in the same area with flexion and extension lumbar motion.  Decreased flexion-extension motion but full range of motion with side bending and rotation of the lumbar spine.  He is able to climb on and off the examination table without difficulty or  assistance.   Skin:     General: Skin is warm.      Capillary Refill: Capillary refill takes less than 2 seconds.   Neurological:      General: No focal deficit present.      Mental Status: He is alert and oriented to person, place, and time.      Gait: Gait is intact.   Psychiatric:         Behavior: Behavior is cooperative.       Assessment:       1. Encounter related to worker's compensation claim    2. Chronic midline low back pain without sciatica    3. Acute exacerbation of chronic low back pain    4. Strain of lumbar region, subsequent encounter          Plan:         Medications Ordered This Encounter   Medications    diclofenac sodium (VOLTAREN) 1 % Gel     Sig: Apply 2 g topically 4 (four) times daily.     Dispense:  150 g     Refill:  1    naproxen (NAPROSYN) 500 MG tablet     Sig: Take 1 tablet (500 mg total) by mouth 2 (two) times daily.     Dispense:  30 tablet     Refill:  1    tiZANidine (ZANAFLEX) 4 MG tablet     Sig: TAKE 1 TABLET BY MOUTH EVERY NIGHT AS NEEDED FOR PAIN OR SPASM     Dispense:  20 tablet     Refill:  0     Mehul's chronic lower back condition remains stable with the current treatment regimen.  He was advised to continue to perform his home exercises on a regular basis.  He is unsure whether he has any remaining refills of the medication, but new prescriptions were provided today just in case of any need.  Reassess in approximately 2 months but he may return sooner if he has any unexpected worsening of his lower back condition.      Restrictions: Regular Duty  Follow up in about 2 months (around 4/22/2023).

## 2023-02-23 ENCOUNTER — OFFICE VISIT (OUTPATIENT)
Dept: URGENT CARE | Facility: CLINIC | Age: 53
End: 2023-02-23
Payer: COMMERCIAL

## 2023-02-23 VITALS
HEIGHT: 64 IN | SYSTOLIC BLOOD PRESSURE: 132 MMHG | HEART RATE: 60 BPM | WEIGHT: 130 LBS | DIASTOLIC BLOOD PRESSURE: 82 MMHG | BODY MASS INDEX: 22.2 KG/M2 | TEMPERATURE: 97 F | OXYGEN SATURATION: 99 %

## 2023-02-23 DIAGNOSIS — M79.641 HAND PAIN, RIGHT: ICD-10-CM

## 2023-02-23 DIAGNOSIS — Z02.6 ENCOUNTER RELATED TO WORKER'S COMPENSATION CLAIM: ICD-10-CM

## 2023-02-23 DIAGNOSIS — M77.8 TENDONITIS OF RIGHT HAND: Primary | ICD-10-CM

## 2023-02-23 LAB
BREATH ALCOHOL: 0
CTP QC/QA: YES
POC 10 PANEL DRUG SCREEN: NEGATIVE

## 2023-02-23 PROCEDURE — 73130 X-RAY EXAM OF HAND: CPT | Mod: RT,S$GLB,, | Performed by: RADIOLOGY

## 2023-02-23 PROCEDURE — 73130 XR HAND COMPLETE 3 VIEW RIGHT: ICD-10-PCS | Mod: RT,S$GLB,, | Performed by: RADIOLOGY

## 2023-02-23 PROCEDURE — 99214 OFFICE O/P EST MOD 30 MIN: CPT | Mod: S$GLB,,, | Performed by: PHYSICIAN ASSISTANT

## 2023-02-23 PROCEDURE — 82075 POCT BREATH ALCOHOL TEST: ICD-10-PCS | Mod: S$GLB,,, | Performed by: PHYSICIAN ASSISTANT

## 2023-02-23 PROCEDURE — 80305 POCT RAPID DRUG SCREEN 10 PANEL: ICD-10-PCS | Mod: S$GLB,,, | Performed by: PHYSICIAN ASSISTANT

## 2023-02-23 PROCEDURE — 80305 DRUG TEST PRSMV DIR OPT OBS: CPT | Mod: S$GLB,,, | Performed by: PHYSICIAN ASSISTANT

## 2023-02-23 PROCEDURE — 99214 PR OFFICE/OUTPT VISIT, EST, LEVL IV, 30-39 MIN: ICD-10-PCS | Mod: S$GLB,,, | Performed by: PHYSICIAN ASSISTANT

## 2023-02-23 PROCEDURE — 82075 ASSAY OF BREATH ETHANOL: CPT | Mod: S$GLB,,, | Performed by: PHYSICIAN ASSISTANT

## 2023-02-23 RX ORDER — PREDNISONE 20 MG/1
40 TABLET ORAL DAILY
Qty: 8 TABLET | Refills: 0 | Status: SHIPPED | OUTPATIENT
Start: 2023-02-23 | End: 2023-02-27

## 2023-02-23 NOTE — PROGRESS NOTES
Subjective:       Patient ID: Mehul Randhawa is a 52 y.o. male.    Chief Complaint: Hand Injury (Right)    New Injury Right Hand (DOI 2/15/23). He states that he was picking up a small box when his hand started to hurt. He states that he started to rub his and and the pain went away. He states as the days went on the pain got worse. He states that he rubbed some medication oil on his hand for pain but the pain would just come back. He states that he has been ice when and home. He states that at one point it almost felt like his finger was broken. Today he states that his pain level is 5/10.    EC    52-year-old male with right hand 4th digit pain at MCP joint x1 week.  Works at [employer] and states he was picking up a box when he felt immediate pain at his 4th digit MCP of his right hand.  Pain went away quickly after initial incident.  Denies feeling any pop, snap or pulling.  He was asymptomatic for 3 days and then the pain started to return.  Now finds that the pain is constant anytime he has to grab something and hold onto it or utilize at hand for an extended period.  Pain is sharp and constant rated 5/10.  Has used some OTC cream with minimal relief.  Affects his daily activities at work in which he can not hold a broom or tolerate carrying boxes for extend.  Due to the discomfort.  Denies any trauma or injury other than lifting the box when the initial incident occurred.    Hand Injury   Pertinent negatives include no chest pain or numbness.     Constitution: Positive for activity change and generalized weakness. Negative for chills, sweating, fatigue and fever.   HENT: Negative.     Neck: neck negative.   Cardiovascular: Negative.  Negative for chest pain.   Eyes: Negative.    Respiratory: Negative.  Negative for chest tightness and shortness of breath.    Gastrointestinal: Negative.    Endocrine: negative.   Genitourinary: Negative.    Musculoskeletal:  Positive for pain, joint pain, joint swelling, abnormal  ROM of joint, muscle cramps and muscle ache.   Skin:  Negative for color change, erythema, bruising and hives.   Allergic/Immunologic: Negative.  Negative for environmental allergies, hives, itching and sneezing.   Neurological: Negative.  Negative for dizziness, light-headedness, disorientation, altered mental status, numbness and tingling.   Hematologic/Lymphatic: Negative.    Psychiatric/Behavioral: Negative.  Negative for altered mental status, disorientation, confusion, agitation and nervous/anxious. The patient is not nervous/anxious.       Objective:      Physical Exam  Vitals and nursing note reviewed.   Constitutional:       General: He is awake. He is not in acute distress.     Appearance: Normal appearance. He is well-developed and normal weight. He is not ill-appearing, toxic-appearing or diaphoretic.   HENT:      Head: Normocephalic and atraumatic.      Right Ear: External ear normal.      Left Ear: External ear normal.      Nose: Nose normal.      Mouth/Throat:      Mouth: Mucous membranes are moist.   Eyes:      Conjunctiva/sclera: Conjunctivae normal.      Pupils: Pupils are equal, round, and reactive to light.   Cardiovascular:      Rate and Rhythm: Normal rate and regular rhythm.      Pulses: Normal pulses.      Heart sounds: Normal heart sounds.   Pulmonary:      Effort: Pulmonary effort is normal.      Breath sounds: Normal breath sounds.   Musculoskeletal:        Hands:       Cervical back: Normal range of motion and neck supple.   Skin:     General: Skin is warm and dry.      Findings: No erythema.   Neurological:      Mental Status: He is alert.   Psychiatric:         Behavior: Behavior is cooperative.        X-Ray Hand 3 View Right    Result Date: 2/23/2023  EXAMINATION: XR HAND COMPLETE 3 VIEW RIGHT CLINICAL HISTORY: Pain in right hand TECHNIQUE: PA, lateral, and oblique views of the right hand were performed. COMPARISON: None FINDINGS: No fracture or dislocation.  No bone destruction  identified     See above Electronically signed by: Charles Espinosa MD Date:    02/23/2023 Time:    12:27     Assessment:       1. Tendonitis of right hand    2. Encounter related to worker's compensation claim    3. Hand pain, right          Plan:         Medications Ordered This Encounter   Medications    predniSONE (DELTASONE) 20 MG tablet     Sig: Take 2 tablets (40 mg total) by mouth once daily. for 4 days     Dispense:  8 tablet     Refill:  0     Patient Instructions: Attention not to aggravate affected area   Restrictions: Regular Duty  Follow up in about 4 days (around 2/27/2023).      Patient will take Naprosyn he has at home, will add prednisone for inflammation.  Have him follow-up in 5 days after completion of steroid to determine if his symptoms have resolved.  Will re-evaluate and make further treatment plans at that time.  States there are no light duty his job and declines to be off work.    Results for orders placed or performed in visit on 02/23/23   POCT Rapid Drug Screen 10 Panel   Result Value Ref Range    POC 10 Panel Drug Screen Negative Negative     Acceptable Yes    POCT BREATH ALCOHOL TEST   Result Value Ref Range    Breath Alcohol 0.000     X-Ray Hand 3 View Right    Result Date: 2/23/2023  EXAMINATION: XR HAND COMPLETE 3 VIEW RIGHT CLINICAL HISTORY: Pain in right hand TECHNIQUE: PA, lateral, and oblique views of the right hand were performed. COMPARISON: None FINDINGS: No fracture or dislocation.  No bone destruction identified     See above Electronically signed by: Charles Espinosa MD Date:    02/23/2023 Time:    12:27

## 2023-02-23 NOTE — LETTER
[facility]  [address] [address]  Phone: [phone]  Fax: [phone]  [facility]: [id number]    Pt Name: Mehul Randhawa  Injury Date: 02/15/2023   Employee ID:  Date of Treatment: 02/23/2023   Company: [employer]      Appointment Time:  Arrived: 10:57 AM   Provider: ISIDRO Ramírez Time Out:12:50 PM     Office Treatment:   1. Tendonitis of right hand    2. Encounter related to worker's compensation claim    3. Hand pain, right      Medications Ordered This Encounter   Medications    predniSONE (DELTASONE) 20 MG tablet      Patient Instructions: Attention not to aggravate affected area      Restrictions: Regular Duty     Return Appointment:   2/27/2023 at 12:15 PM     SH

## 2023-02-27 ENCOUNTER — OFFICE VISIT (OUTPATIENT)
Dept: URGENT CARE | Facility: CLINIC | Age: 53
End: 2023-02-27
Payer: COMMERCIAL

## 2023-02-27 VITALS
BODY MASS INDEX: 22.2 KG/M2 | HEIGHT: 64 IN | SYSTOLIC BLOOD PRESSURE: 133 MMHG | OXYGEN SATURATION: 98 % | RESPIRATION RATE: 18 BRPM | DIASTOLIC BLOOD PRESSURE: 77 MMHG | HEART RATE: 86 BPM | TEMPERATURE: 98 F | WEIGHT: 130 LBS

## 2023-02-27 DIAGNOSIS — Z02.6 ENCOUNTER RELATED TO WORKER'S COMPENSATION CLAIM: Primary | ICD-10-CM

## 2023-02-27 DIAGNOSIS — M77.8 TENDONITIS OF RIGHT HAND: ICD-10-CM

## 2023-02-27 PROCEDURE — 99214 PR OFFICE/OUTPT VISIT, EST, LEVL IV, 30-39 MIN: ICD-10-PCS | Mod: S$GLB,,, | Performed by: STUDENT IN AN ORGANIZED HEALTH CARE EDUCATION/TRAINING PROGRAM

## 2023-02-27 PROCEDURE — 99214 OFFICE O/P EST MOD 30 MIN: CPT | Mod: S$GLB,,, | Performed by: STUDENT IN AN ORGANIZED HEALTH CARE EDUCATION/TRAINING PROGRAM

## 2023-02-27 NOTE — LETTER
[facility]  [address] [address]  Phone: [phone]  Fax: [phone]  [facility]: [id number]    Pt Name: Mehul Randhawa  Injury Date: 02/15/2023   Employee ID: [id number] Date of Treatment: 02/27/2023   Company: [employer]      Appointment Time: 12:15pm Arrived: 12:14pm   Provider: Celena Hernandez MD Time Out: 12:55pm     Office Treatment:   1. Encounter related to worker's compensation claim    2. Tendonitis of right hand          Patient Instructions: Attention not to aggravate affected area      Restrictions: Regular Duty     Return Appointment: 3/7/2023 at 12:15pm.  EC

## 2023-02-27 NOTE — PROGRESS NOTES
Subjective:       Patient ID: Mehul Randhawa is a 52 y.o. male.    Chief Complaint: Hand Injury (RT)    WC Follow-up of RT Hand Injury ( DOI 02-15-23 ) Works for [employer]. Pain score 1/10 with complaints of Tightness when trying to make a fist. Taking Naproxen and using Voltaren gel. SH    See MA note above. Anu HOUSTON note:  Significant improvement since last visit. He has been taking his meds as prescribed. Denies any new pain.    Hand Injury   Pertinent negatives include no numbness.   Musculoskeletal:  Negative for pain, trauma, joint pain, joint swelling, abnormal ROM of joint, muscle cramps and muscle ache.   Skin:  Negative for erythema and bruising.   Neurological:  Negative for numbness and tingling.      Objective:      Physical Exam  Vitals and nursing note reviewed.   Constitutional:       General: He is not in acute distress.     Appearance: Normal appearance. He is not ill-appearing.   HENT:      Head: Normocephalic.   Eyes:      Conjunctiva/sclera: Conjunctivae normal.   Pulmonary:      Effort: No respiratory distress.   Musculoskeletal:      Right hand: Tenderness present. No swelling or deformity. Normal range of motion. Normal strength. Normal sensation. Normal capillary refill. Normal pulse.      Comments: Tenderness over dorsal right 4th MCP joint and palmar side tenderness over MCP joints 2-4.  The latter tenderness as described as tightness elicited with  strength in dorsally the 4th MCP joint pain is include with hyper extension of the finger   Skin:     Findings: No erythema.   Neurological:      Mental Status: He is alert and oriented to person, place, and time.      GCS: GCS eye subscore is 4. GCS verbal subscore is 5. GCS motor subscore is 6.   Psychiatric:         Attention and Perception: Attention normal.         Mood and Affect: Mood normal.         Behavior: Behavior normal.       Assessment:       1. Encounter related to worker's compensation claim    2. Tendonitis of right hand           Plan:       Patient notes significant improvement since beginning the anti-inflammatory regimen.  He will continue work at full duty and attempt to taper off of the naproxen, completed prednisone today.  Okay to use the diclofenac gel as needed but recommended only taking the naproxen if needed for more significant pain. He will follow-up in approx 1 week for further evaluation.       Patient Instructions: Attention not to aggravate affected area   Restrictions: Regular Duty  Follow up in about 8 days (around 3/7/2023).

## 2023-03-07 ENCOUNTER — OFFICE VISIT (OUTPATIENT)
Dept: URGENT CARE | Facility: CLINIC | Age: 53
End: 2023-03-07
Payer: COMMERCIAL

## 2023-03-07 VITALS
DIASTOLIC BLOOD PRESSURE: 84 MMHG | HEART RATE: 89 BPM | OXYGEN SATURATION: 100 % | TEMPERATURE: 99 F | SYSTOLIC BLOOD PRESSURE: 138 MMHG | RESPIRATION RATE: 16 BRPM

## 2023-03-07 DIAGNOSIS — M77.8 TENDONITIS OF RIGHT HAND: ICD-10-CM

## 2023-03-07 DIAGNOSIS — Z02.6 ENCOUNTER RELATED TO WORKER'S COMPENSATION CLAIM: Primary | ICD-10-CM

## 2023-03-07 PROCEDURE — 99214 OFFICE O/P EST MOD 30 MIN: CPT | Mod: S$GLB,,, | Performed by: STUDENT IN AN ORGANIZED HEALTH CARE EDUCATION/TRAINING PROGRAM

## 2023-03-07 PROCEDURE — 99214 PR OFFICE/OUTPT VISIT, EST, LEVL IV, 30-39 MIN: ICD-10-PCS | Mod: S$GLB,,, | Performed by: STUDENT IN AN ORGANIZED HEALTH CARE EDUCATION/TRAINING PROGRAM

## 2023-03-07 NOTE — LETTER
[facility]  [address] [address]  Phone: [phone]  Fax: [phone]  [facility]: [email or web address]    Pt Name: Mehul Randhawa  Injury Date: 02/15/2023   Employee ID: [id number] Date of First Treatment: 03/07/2023   Company: [employer]      Appointment Time: 12:15pm Arrived: 12:13pm   Provider: Celena Hernandez MD Time Out: 12:45pm     Office Treatment:   1. Encounter related to worker's compensation claim    2. Tendonitis of right hand          Patient Instructions: Attention not to aggravate affected area      Restrictions: Regular Duty     Return Appointment: 3/21/2023 at 12:15pm.  EC

## 2023-03-07 NOTE — PROGRESS NOTES
Subjective:       Patient ID: Mehul Randhawa is a 52 y.o. male.    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 Taking Naproxen and using Voltaren gel. No PT or HEP for the hand. He soaks his hand in Epsom salt and it helped. RHD. mcj         Hand Injury   Pertinent negatives include no numbness.     Musculoskeletal:  Positive for pain. Negative for trauma, joint pain, joint swelling, abnormal ROM of joint, muscle cramps and muscle ache.   Skin:  Negative for erythema and bruising.   Neurological:  Negative for numbness and tingling.      Objective:      Physical Exam  Vitals and nursing note reviewed.   Constitutional:       General: He is not in acute distress.     Appearance: Normal appearance. He is not ill-appearing.   HENT:      Head: Normocephalic.   Eyes:      Conjunctiva/sclera: Conjunctivae normal.   Pulmonary:      Effort: No respiratory distress.   Musculoskeletal:      Right hand: No swelling or deformity. Normal range of motion. Normal sensation. Normal capillary refill. Normal pulse.      Comments: Right hand tenderness over distal 4th metacarpal, increased with finger extension. Tightness over 2nd-4th metacarpal heads with gripping,    Skin:     Findings: No erythema.   Neurological:      Mental Status: He is alert and oriented to person, place, and time.      GCS: GCS eye subscore is 4. GCS verbal subscore is 5. GCS motor subscore is 6.   Psychiatric:         Attention and Perception: Attention normal.         Mood and Affect: Mood normal.         Behavior: Behavior normal.       Assessment:       1. Encounter related to worker's compensation claim    2. Tendonitis of right hand          Plan:       The patient maintains that he would like to continue working full duty without restrictions.   I did recommend that we discussed beginning occupational therapy  at his next visit if he does not see significant improvement by then.  He will continue with Epsom salt soaks, topical Voltaren gel, and naproxen as needed.  Patient declines need for medication refills this time.  He is agreeable with the plan of care.       Patient Instructions: Attention not to aggravate affected area   Restrictions: Regular Duty  Follow up in about 2 weeks (around 3/21/2023).

## 2023-03-21 ENCOUNTER — OFFICE VISIT (OUTPATIENT)
Dept: URGENT CARE | Facility: CLINIC | Age: 53
End: 2023-03-21
Payer: COMMERCIAL

## 2023-03-21 VITALS
SYSTOLIC BLOOD PRESSURE: 126 MMHG | WEIGHT: 130 LBS | HEART RATE: 76 BPM | DIASTOLIC BLOOD PRESSURE: 76 MMHG | TEMPERATURE: 99 F | HEIGHT: 64 IN | BODY MASS INDEX: 22.2 KG/M2 | OXYGEN SATURATION: 97 %

## 2023-03-21 DIAGNOSIS — Z02.6 ENCOUNTER RELATED TO WORKER'S COMPENSATION CLAIM: Primary | ICD-10-CM

## 2023-03-21 DIAGNOSIS — M77.8 TENDONITIS OF RIGHT HAND: ICD-10-CM

## 2023-03-21 PROCEDURE — 99214 OFFICE O/P EST MOD 30 MIN: CPT | Mod: S$GLB,,, | Performed by: STUDENT IN AN ORGANIZED HEALTH CARE EDUCATION/TRAINING PROGRAM

## 2023-03-21 PROCEDURE — 99214 PR OFFICE/OUTPT VISIT, EST, LEVL IV, 30-39 MIN: ICD-10-PCS | Mod: S$GLB,,, | Performed by: STUDENT IN AN ORGANIZED HEALTH CARE EDUCATION/TRAINING PROGRAM

## 2023-03-21 NOTE — PROGRESS NOTES
Subjective:       Patient ID: Mehul Randhawa is a 52 y.o. male.    Chief Complaint: No chief complaint on file.    Patient's place of employment - [employer]  Patient's job title - Shipping   Date of Injury - 2/15/23  Body part injured - Right Hand  Current work status per last visit - Regular Duty  Improved, same, or worse - Improved  Pain Scale right now (1-10) - 0/10    See MA note above. Begin MD note:  Patient states he is continuing to improve.  He still has discomfort when holding a gripping certain things.  He is using his Chinese topical, Voltaren gel, and naproxen as needed.  These help.  He also has a wrap that he ties on his hand to provide support which is beneficial also.  No new complaints or worsening symptoms at this time.    Constitution: Positive for activity change and generalized weakness.   Musculoskeletal:  Positive for abnormal ROM of joint and back pain (chronic). Negative for pain, joint pain, joint swelling, muscle cramps and muscle ache.   Skin:  Negative for erythema.   Neurological:  Negative for numbness and tingling.   Psychiatric/Behavioral:  Negative for sleep disturbance.       Objective:      Physical Exam  Vitals and nursing note reviewed.   Constitutional:       General: He is not in acute distress.     Appearance: Normal appearance. He is not ill-appearing.   HENT:      Head: Normocephalic.   Eyes:      Conjunctiva/sclera: Conjunctivae normal.   Pulmonary:      Effort: No respiratory distress.   Musculoskeletal:      Comments: Normal  strength and range of motion right hand.  Nontender to palpation.  No erythema or swelling.   Skin:     Findings: No erythema.   Neurological:      Mental Status: He is alert and oriented to person, place, and time.      GCS: GCS eye subscore is 4. GCS verbal subscore is 5. GCS motor subscore is 6.   Psychiatric:         Attention and Perception: Attention normal.         Mood and Affect: Mood normal.         Behavior: Behavior normal.        Assessment:       1. Encounter related to worker's compensation claim          Plan:       Patient reports continued improvement.  Will continue regular duty.  Declines therapy referral.  We will continue with home medications including topical Voltaren gel, naproxen as needed, and a Chinese topical oil.  Agreed to re-evaluate in 3 weeks with anticipation of discharge at that time.       Patient Instructions: Attention not to aggravate affected area   Restrictions: Regular Duty  Follow up in about 3 weeks (around 4/11/2023).

## 2023-03-21 NOTE — LETTER
[facility]  [address] [address]  Phone: [phone]  Fax: [phone]  [facility]: [id number]    Pt Name: Mehul Randhawa  Injury Date: 02/15/2023   Employee ID: [id number] Date of Treatment: 03/21/2023   Company: [employer]      Appointment Time: 12:15pm Arrived: 12:15pm   Provider: Celena Hernandez MD Time Out: 12:40pm     Office Treatment:   1. Encounter related to worker's compensation claim          Patient Instructions: Attention not to aggravate affected area      Restrictions: Regular Duty     Return Appointment: 4/11/2023 at 12:15pm.  EC

## 2023-03-25 ENCOUNTER — CLINICAL SUPPORT (OUTPATIENT)
Dept: OTHER | Facility: CLINIC | Age: 53
End: 2023-03-25
Payer: COMMERCIAL

## 2023-03-25 DIAGNOSIS — Z00.8 ENCOUNTER FOR OTHER GENERAL EXAMINATION: ICD-10-CM

## 2023-03-26 VITALS
DIASTOLIC BLOOD PRESSURE: 79 MMHG | WEIGHT: 129 LBS | SYSTOLIC BLOOD PRESSURE: 131 MMHG | HEIGHT: 62 IN | BODY MASS INDEX: 23.74 KG/M2

## 2023-03-26 LAB
HDLC SERPL-MCNC: 59 MG/DL
POC CHOLESTEROL, LDL (DOCK): 112 MG/DL
POC CHOLESTEROL, TOTAL: 191 MG/DL
POC GLUCOSE, FASTING: 99 MG/DL (ref 60–110)
TRIGL SERPL-MCNC: 112 MG/DL

## 2023-04-11 ENCOUNTER — OFFICE VISIT (OUTPATIENT)
Dept: URGENT CARE | Facility: CLINIC | Age: 53
End: 2023-04-11
Payer: COMMERCIAL

## 2023-04-11 VITALS
DIASTOLIC BLOOD PRESSURE: 66 MMHG | OXYGEN SATURATION: 99 % | SYSTOLIC BLOOD PRESSURE: 141 MMHG | HEART RATE: 77 BPM | TEMPERATURE: 98 F

## 2023-04-11 DIAGNOSIS — Z02.6 ENCOUNTER RELATED TO WORKER'S COMPENSATION CLAIM: Primary | ICD-10-CM

## 2023-04-11 DIAGNOSIS — M77.8 TENDONITIS OF RIGHT HAND: ICD-10-CM

## 2023-04-11 PROCEDURE — 99214 PR OFFICE/OUTPT VISIT, EST, LEVL IV, 30-39 MIN: ICD-10-PCS | Mod: S$GLB,,, | Performed by: STUDENT IN AN ORGANIZED HEALTH CARE EDUCATION/TRAINING PROGRAM

## 2023-04-11 PROCEDURE — 99214 OFFICE O/P EST MOD 30 MIN: CPT | Mod: S$GLB,,, | Performed by: STUDENT IN AN ORGANIZED HEALTH CARE EDUCATION/TRAINING PROGRAM

## 2023-04-11 NOTE — LETTER
[facility]  [address] [address]  Phone: [phone]  Fax: [phone]  [facility]: [id number]     Name: Mehul Randhawa  Injury Date: 02/15/2023   Employee ID: [id number] Date of Treatment: 04/11/2023   Company: [employer]      Appointment Time: 12:00 PM Arrived: 12:13 PM   Provider: Celena Hernandez MD Time Out: 1:06 PM     Office Treatment:   1. Encounter related to worker's compensation claim    2. Tendonitis of right hand          Patient Instructions: Attention not to aggravate affected area      Restrictions: Regular Duty     Return Appointment: 4/21/2023 at 12:15 PM

## 2023-04-11 NOTE — LETTER
[facility]  [address] [address]  Phone: [phone]  Fax: [phone]  [facility]: [email or web address]     Name: Mehul Randhawa  Injury Date: 02/15/2023   Employee ID: [id number] Date of Treatment: 04/11/2023   Company: [employer]      Appointment Time: 12:00 PM Arrived: 12:13 PM   Provider: Celena Hernandez MD Time Out: 1:06 PM     Office Treatment:   1. Encounter related to worker's compensation claim    2. Tendonitis of right hand          Patient Instructions: Attention not to aggravate affected area      Restrictions: Regular Duty     Return Appointment: 5/23/2023 at 12:15 PM

## 2023-04-11 NOTE — PROGRESS NOTES
Subjective:      Patient ID: Mehul Randhawa is a 52 y.o. male.    Chief Complaint: No chief complaint on file.    Patient's place of employment - [employer]  Patient's job title -   Date of Injury - 02.15.2023  Body part injured - Right Hand, Initially was lifting a box and felt pain. Two to three days later the pain increased to a noticeable point. Current work status per last visit - Attention not to aggravate affected area. Restrictions: Regular Duty  Improved, same, or worse - Mehul states he has noticed the pain getting better. Has continued to use Naprosyn 500mg and gel as prescribed.  Pain Scale right now (1-10) -  1/10 ( really feels less than 1  Used Uzbek ointment on hand over the weekend. Went into work on a Holiday as extra and noticed hand getting worse. While using a broom to keep his area clean he noticed the pain increases. Mehul has not attempted to cut the gras yet as his pain increases when he grasps items for a period of time.      See MA note above. Begin MD note:  Patient says that his symptoms are slowly improving and no drastic changes since his last appointment.  He still has pain with certain activities.  He continues to declined physical therapy since that did not help much for his back so he does to go through therefore his hand.  The Chinese ointment and as-needed naproxen are still beneficial when he uses them.  Still able to perform job duties.      Constitution: Negative.   HENT: Negative.     Neck: neck negative.   Cardiovascular: Negative.    Eyes: Negative.    Respiratory: Negative.     Gastrointestinal: Negative.    Endocrine: negative.   Genitourinary: Negative.    Musculoskeletal:  Positive for pain and abnormal ROM of joint.   Skin: Negative.  Negative for erythema.   Allergic/Immunologic: Negative.    Neurological: Negative.    Hematologic/Lymphatic: Negative.    Psychiatric/Behavioral: Negative.     Objective:     Physical Exam  Vitals and nursing note reviewed.    Constitutional:       General: He is not in acute distress.     Appearance: Normal appearance. He is not ill-appearing.   HENT:      Head: Normocephalic.   Eyes:      Conjunctiva/sclera: Conjunctivae normal.   Pulmonary:      Effort: No respiratory distress.   Musculoskeletal:      Right hand: No swelling or deformity. Normal range of motion. Decreased strength (decreased effort with hand  sec/to fear of pain). Normal sensation. Normal capillary refill. Normal pulse.   Skin:     Findings: No erythema.   Neurological:      Mental Status: He is alert and oriented to person, place, and time.      GCS: GCS eye subscore is 4. GCS verbal subscore is 5. GCS motor subscore is 6.   Psychiatric:         Attention and Perception: Attention normal.         Mood and Affect: Mood normal.         Behavior: Behavior normal.      Assessment:      1. Encounter related to worker's compensation claim    2. Tendonitis of right hand      Plan:     Given the duration of patient's symptoms, I continue to encourage occupational therapy for relief of his symptoms.  However, he declines at this time.  He would like to space his visits out to 6 weeks stating that he thinks with more time it will be better.  He does not need medication refills at this time.  Okay to follow-up sooner if needed.       Patient Instructions: Attention not to aggravate affected area   Restrictions: Regular Duty  Follow up in about 6 weeks (around 5/23/2023).

## 2023-04-21 ENCOUNTER — OFFICE VISIT (OUTPATIENT)
Dept: URGENT CARE | Facility: CLINIC | Age: 53
End: 2023-04-21
Payer: COMMERCIAL

## 2023-04-21 VITALS
HEIGHT: 62 IN | HEART RATE: 74 BPM | DIASTOLIC BLOOD PRESSURE: 84 MMHG | OXYGEN SATURATION: 98 % | BODY MASS INDEX: 23.74 KG/M2 | TEMPERATURE: 98 F | WEIGHT: 129 LBS | SYSTOLIC BLOOD PRESSURE: 130 MMHG

## 2023-04-21 DIAGNOSIS — M54.50 CHRONIC MIDLINE LOW BACK PAIN WITHOUT SCIATICA: ICD-10-CM

## 2023-04-21 DIAGNOSIS — G89.29 ACUTE EXACERBATION OF CHRONIC LOW BACK PAIN: ICD-10-CM

## 2023-04-21 DIAGNOSIS — M54.50 ACUTE EXACERBATION OF CHRONIC LOW BACK PAIN: ICD-10-CM

## 2023-04-21 DIAGNOSIS — Z02.6 ENCOUNTER RELATED TO WORKER'S COMPENSATION CLAIM: Primary | ICD-10-CM

## 2023-04-21 DIAGNOSIS — S39.012D STRAIN OF LUMBAR REGION, SUBSEQUENT ENCOUNTER: ICD-10-CM

## 2023-04-21 DIAGNOSIS — G89.29 CHRONIC MIDLINE LOW BACK PAIN WITHOUT SCIATICA: ICD-10-CM

## 2023-04-21 PROCEDURE — 99213 PR OFFICE/OUTPT VISIT, EST, LEVL III, 20-29 MIN: ICD-10-PCS | Mod: S$GLB,,,

## 2023-04-21 PROCEDURE — 99213 OFFICE O/P EST LOW 20 MIN: CPT | Mod: S$GLB,,,

## 2023-04-21 RX ORDER — TIZANIDINE 4 MG/1
TABLET ORAL
Qty: 20 TABLET | Refills: 0 | Status: SHIPPED | OUTPATIENT
Start: 2023-04-21 | End: 2023-06-21 | Stop reason: SDUPTHER

## 2023-04-21 NOTE — PROGRESS NOTES
Subjective:      Patient ID: Mehul Randhawa is a 52 y.o. male.    Chief Complaint: Back Pain    See MA note. Follow-up for chronic lumbar injury. He continues to experience intermittent flare-up with LLE fatigue/weakness, but these episodes are not long lasting and similar as in the past. Condition is stable with use of voltaren gel, naproxen, and zanaflex. He requests refill of zanaflex. He is able to perform his job duties and occasionally requests assistance with heavier items.    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 He takes the RX and applies the gel as needed. LRC      Constitution: Negative for activity change.   HENT: Negative.     Neck: neck negative.   Cardiovascular: Negative.    Eyes: Negative.    Respiratory: Negative.     Gastrointestinal: Negative.    Genitourinary: Negative.    Musculoskeletal:  Positive for back pain and pain with walking.   Skin: Negative.    Objective:     Physical Exam  Vitals and nursing note reviewed.   Constitutional:       General: He is not in acute distress.  HENT:      Head: Normocephalic.   Eyes:      Conjunctiva/sclera: Conjunctivae normal.   Musculoskeletal:      Cervical back: No pain with movement.      Lumbar back: Tenderness present. No swelling, deformity or spasms. Normal range of motion. Negative right straight leg raise test and negative left straight leg raise test.        Back:       Comments: No gross deformity to lumbar spine. TTP lower lumbar spine centrally and unchanged from last visits. Pain to same area with flexion > extension. Otherwise, full ROM all planes. 4-/5 LLE and 4+/5 RLE strength. Symmetrical LE reflexes bilaterally.   Skin:     General: Skin is warm.       Capillary Refill: Capillary refill takes less than 2 seconds.   Neurological:      General: No focal deficit present.      Mental Status: He is alert and oriented to person, place, and time.      Deep Tendon Reflexes: Reflexes are normal and symmetric.   Psychiatric:         Attention and Perception: Attention normal.         Mood and Affect: Mood and affect normal.         Speech: Speech normal.         Behavior: Behavior normal. Behavior is cooperative.      Assessment:      1. Encounter related to worker's compensation claim    2. Chronic midline low back pain without sciatica    3. Strain of lumbar region, subsequent encounter    4. Acute exacerbation of chronic low back pain      Plan:     Clinically stable with intermittent aggravation of symptoms. Continue with current medication regimen and he's aware to perform home exercises on a regular basis. Reassess in 2 months.    Medications Ordered This Encounter   Medications    tiZANidine (ZANAFLEX) 4 MG tablet     Sig: TAKE 1 TABLET BY MOUTH EVERY NIGHT AS NEEDED FOR PAIN OR SPASM     Dispense:  20 tablet     Refill:  0     Patient Instructions: Daily home exercises/warm soaks   Restrictions: Regular Duty  Follow up in about 2 months (around 6/21/2023).

## 2023-04-21 NOTE — LETTER
[facility]  [address] [address]  Phone: [phone]  Fax: [phone]  [facility]: [id number]    Pt Name: Mehul Randhawa  Injury Date: 08/25/2017   Employee ID: [id number] Date of  Treatment: 04/21/2023   Company: [employer]      Appointment Time: 09:15 AM Arrived: 9:13 AM    Provider: Sheldon Martínez, DO Time Out: 10:01 AM      Office Treatment:   1. Encounter related to worker's compensation claim    2. Chronic midline low back pain without sciatica    3. Strain of lumbar region, subsequent encounter    4. Acute exacerbation of chronic low back pain      Medications Ordered This Encounter   Medications    tiZANidine (ZANAFLEX) 4 MG tablet      Patient Instructions: Daily home exercises/warm soaks      Restrictions: Regular Duty     Return Appointment: 5/23/2023 at  9:15 AM LUMA

## 2023-04-21 NOTE — LETTER
[facility]  [address] [address]  Phone: [phone]  Fax: [phone]  [facility]: [id number]    Pt Name: Mehul Randhawa  Injury Date: 08/25/2017   Employee ID: [id number] Date of  Treatment: 04/21/2023   Company: [employer]      Appointment Time: 09:15 AM Arrived: 9:13 AM    Provider: Sheldon Martínez, DO Time Out: 10:01 AM      Office Treatment:   1. Encounter related to worker's compensation claim    2. Chronic midline low back pain without sciatica    3. Strain of lumbar region, subsequent encounter    4. Acute exacerbation of chronic low back pain      Medications Ordered This Encounter   Medications    tiZANidine (ZANAFLEX) 4 MG tablet      Patient Instructions: Daily home exercises/warm soaks      Restrictions: Regular Duty     Return Appointment: 6/21/2023 at  9:15 AM LUMA

## 2023-05-23 ENCOUNTER — OFFICE VISIT (OUTPATIENT)
Dept: URGENT CARE | Facility: CLINIC | Age: 53
End: 2023-05-23
Payer: COMMERCIAL

## 2023-05-23 VITALS
HEART RATE: 84 BPM | SYSTOLIC BLOOD PRESSURE: 129 MMHG | HEIGHT: 62 IN | WEIGHT: 129 LBS | DIASTOLIC BLOOD PRESSURE: 83 MMHG | BODY MASS INDEX: 23.74 KG/M2 | OXYGEN SATURATION: 98 % | TEMPERATURE: 98 F

## 2023-05-23 DIAGNOSIS — Z02.6 ENCOUNTER RELATED TO WORKER'S COMPENSATION CLAIM: ICD-10-CM

## 2023-05-23 DIAGNOSIS — M77.8 TENDONITIS OF RIGHT HAND: Primary | ICD-10-CM

## 2023-05-23 PROCEDURE — 99213 PR OFFICE/OUTPT VISIT, EST, LEVL III, 20-29 MIN: ICD-10-PCS | Mod: S$GLB,,, | Performed by: STUDENT IN AN ORGANIZED HEALTH CARE EDUCATION/TRAINING PROGRAM

## 2023-05-23 PROCEDURE — 99213 OFFICE O/P EST LOW 20 MIN: CPT | Mod: S$GLB,,, | Performed by: STUDENT IN AN ORGANIZED HEALTH CARE EDUCATION/TRAINING PROGRAM

## 2023-05-23 NOTE — LETTER
[facility]  [address] [address]  Phone: [phone]  Fax: [phone]  [facility]: [id number]    Pt Name: Mehul Randhawa  Injury Date: 08/25/2017   Employee ID: [id number] Date of Treatment: 05/23/2023   Company: [employer]      Appointment Time: 12:00 PM Arrived: 12:15pm   Provider: Celena Hernandez MD Time Out:1:50 PM     Office Treatment:   1. Tendonitis of right hand    2. Encounter related to worker's compensation claim               Restrictions: Regular Duty     Return Appointment: 7/24/23 at 12:15pm.  EC

## 2023-05-23 NOTE — PROGRESS NOTES
Subjective:      Patient ID: Meuhl Randhawa is a 52 y.o. male.    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 He states that cutting the grass he was fine but when he went to use the weed eater he started to get some discomfort in his hand. He states that as long as he doesn't  anything for longer than 5-10 minutes he is fine.)    See MA note above. Begin MD note:  Patient states he has been performing his regular job duties with the exception of using a stapler that is about 15 pounds and requires long term repetitive gripping. Cutting the grass was fine with his self propelled , but the weed eater caused pain due to the prolonged gripping. He says he is motivated to continue doing thing with his hand to try to build up his endurance, so he will continue cutting the grass once a week. No new symptoms, complaints or concerns at this time.     Hand Injury   Pertinent negatives include no numbness.     Constitution: Positive for activity change and generalized weakness.   Musculoskeletal:  Positive for pain, abnormal ROM of joint, back pain (chronic), muscle cramps and muscle ache.   Skin:  Negative for color change, erythema and bruising.   Neurological:  Negative for numbness and tingling.   Psychiatric/Behavioral:  Negative for sleep disturbance.    Objective:     Physical Exam  Vitals and nursing note reviewed.   Constitutional:       General: He is not in acute distress.     Appearance: Normal appearance. He is not ill-appearing.   HENT:      Head: Normocephalic.   Eyes:      Conjunctiva/sclera: Conjunctivae normal.   Pulmonary:      Effort: No respiratory distress.    Musculoskeletal:      Right hand: Normal.      Comments:  strength 5/5 equal bilateral hand. No tenderness with palpation of right hand. Normal ROM   Skin:     Findings: No erythema.   Neurological:      Mental Status: He is alert and oriented to person, place, and time.      GCS: GCS eye subscore is 4. GCS verbal subscore is 5. GCS motor subscore is 6.   Psychiatric:         Attention and Perception: Attention normal.         Mood and Affect: Mood normal.         Behavior: Behavior normal.      Assessment:      1. Tendonitis of right hand    2. Encounter related to worker's compensation claim      Plan:     He would like to continue to monitor given his progress with activity modification and time. Requested two month follow up. He says at that time if he is still symptomatic he will be agreeable to proceed with therapy. If he is improved he'd like to discuss discharge from Occupational Health. We were in agreement with the plan and he didn't have any further concerns prior to completion of the visit.            Restrictions: Regular Duty  Follow up in about 2 months (around 7/24/2023).

## 2023-05-23 NOTE — LETTER
[facility]  [address] [address]  Phone: [phone]  Fax: [phone]  [facility]: [email or web address]    Pt Name: Mehul Randhawa  Injury Date: 08/25/2017   Employee ID: [id number] Date of Treatment: 05/23/2023   Company: [employer]      Appointment Time: 12:00 PM Arrived: 12:15pm   Provider: Celena Hernandez MD Time Out:12:12pm     Office Treatment:   1. Tendonitis of right hand    2. Encounter related to worker's compensation claim               Restrictions: Regular Duty     Return Appointment: 7/24/23 at 12:15pm.  EC

## 2023-05-23 NOTE — LETTER
[facility]  [address] [address]  Phone: [phone]  Fax: [phone]  [facility]: [email or web address]    Pt Name: Mehul Randhawa  Injury Date: 08/25/2017   Employee ID: [id number] Date of Treatment: 05/23/2023   Company: [employer]      Appointment Time: 12:15pm Arrived: 12:12pm   Provider: Celena Hernandez MD Time Out:1:35pm     Office Treatment:   1. Tendonitis of right hand    2. Encounter related to worker's compensation claim               Restrictions: Regular Duty     Return Appointment: 6/21/2023 at 12:15pm.  EC

## 2023-06-21 ENCOUNTER — OFFICE VISIT (OUTPATIENT)
Dept: URGENT CARE | Facility: CLINIC | Age: 53
End: 2023-06-21
Payer: COMMERCIAL

## 2023-06-21 VITALS
OXYGEN SATURATION: 98 % | RESPIRATION RATE: 16 BRPM | DIASTOLIC BLOOD PRESSURE: 78 MMHG | SYSTOLIC BLOOD PRESSURE: 118 MMHG | HEART RATE: 62 BPM | HEIGHT: 62 IN | WEIGHT: 129 LBS | BODY MASS INDEX: 23.74 KG/M2

## 2023-06-21 DIAGNOSIS — M54.50 CHRONIC MIDLINE LOW BACK PAIN WITHOUT SCIATICA: Primary | ICD-10-CM

## 2023-06-21 DIAGNOSIS — G89.29 CHRONIC MIDLINE LOW BACK PAIN WITHOUT SCIATICA: Primary | ICD-10-CM

## 2023-06-21 DIAGNOSIS — Z02.6 ENCOUNTER RELATED TO WORKER'S COMPENSATION CLAIM: ICD-10-CM

## 2023-06-21 DIAGNOSIS — M47.816 OSTEOARTHRITIS OF LUMBAR SPINE, UNSPECIFIED SPINAL OSTEOARTHRITIS COMPLICATION STATUS: ICD-10-CM

## 2023-06-21 DIAGNOSIS — G89.29 ACUTE EXACERBATION OF CHRONIC LOW BACK PAIN: ICD-10-CM

## 2023-06-21 DIAGNOSIS — S39.012D STRAIN OF LUMBAR REGION, SUBSEQUENT ENCOUNTER: ICD-10-CM

## 2023-06-21 DIAGNOSIS — M54.50 ACUTE EXACERBATION OF CHRONIC LOW BACK PAIN: ICD-10-CM

## 2023-06-21 PROCEDURE — 99213 OFFICE O/P EST LOW 20 MIN: CPT | Mod: S$GLB,,,

## 2023-06-21 PROCEDURE — 99213 PR OFFICE/OUTPT VISIT, EST, LEVL III, 20-29 MIN: ICD-10-PCS | Mod: S$GLB,,,

## 2023-06-21 RX ORDER — TIZANIDINE 4 MG/1
TABLET ORAL
Qty: 20 TABLET | Refills: 0 | Status: SHIPPED | OUTPATIENT
Start: 2023-06-21 | End: 2023-10-27 | Stop reason: SDUPTHER

## 2023-06-21 RX ORDER — NAPROXEN 500 MG/1
500 TABLET ORAL 2 TIMES DAILY WITH MEALS
Qty: 60 TABLET | Refills: 1 | Status: SHIPPED | OUTPATIENT
Start: 2023-06-21 | End: 2023-10-27

## 2023-06-21 RX ORDER — DICLOFENAC SODIUM 10 MG/G
2 GEL TOPICAL 4 TIMES DAILY
Qty: 150 G | Refills: 1 | Status: SHIPPED | OUTPATIENT
Start: 2023-06-21 | End: 2023-10-27 | Stop reason: SDUPTHER

## 2023-06-21 NOTE — PROGRESS NOTES
Subjective:      Patient ID: Mehul Randhawa is a 52 y.o. male.    Chief Complaint: Back Pain    See MA note. Mehul returns for his routine follow-up visit regarding chronic lower back pain.  Today, he denies any symptoms to his lower back.  Continues to perform his regular work duties and takes the 3 prescribed medications as needed.  He does request a refill of the Zanaflex, naproxen, and Voltaren gel.  When he does have back pain flares they typically last no more than 1-2 days.  Symptoms are relieved with the use of the medication and his home exercises.    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   HENT: Negative.     Neck: neck negative.   Cardiovascular: Negative.    Eyes: Negative.    Respiratory: Negative.     Gastrointestinal: Negative.    Genitourinary: Negative.    Musculoskeletal:  Positive for pain, abnormal ROM of joint, back pain (chronic), muscle cramps and muscle ache.   Skin: Negative.  Negative for color change, erythema and bruising.   Neurological: Negative.  Negative for numbness and tingling.   Psychiatric/Behavioral:  Negative for sleep disturbance.    Objective:     Physical Exam  Vitals and nursing note reviewed.   Constitutional:       General: He is not in acute distress.  HENT:      Head: Normocephalic.   Eyes:      General: Lids are normal.      Conjunctiva/sclera: Conjunctivae normal.   Musculoskeletal:      Cervical back: No pain with movement.      Lumbar back: No swelling, deformity, spasms or tenderness. Normal range of motion. Negative right straight leg raise test and negative left straight leg raise test.   Skin:     General: Skin is warm.      Capillary Refill: Capillary refill takes less than 2 seconds.       Findings: No erythema.   Neurological:      Mental Status: He is alert and oriented to person, place, and time.      Gait: Gait is intact.      Deep Tendon Reflexes: Reflexes are normal and symmetric.   Psychiatric:         Attention and Perception: Attention normal.         Mood and Affect: Mood and affect normal.         Speech: Speech normal.         Behavior: Behavior normal. Behavior is cooperative.      Assessment:      1. Chronic midline low back pain without sciatica    2. Encounter related to worker's compensation claim    3. Osteoarthritis of lumbar spine, unspecified spinal osteoarthritis complication status    4. Strain of lumbar region, subsequent encounter    5. Acute exacerbation of chronic low back pain      Plan:   Today, he is asymptomatic but he is a longstanding history of chronic lower back pain that we will continue to manage.  Refills of his medications have been provided today.  He is aware to perform his home exercises on a regular basis and occasionally warm Epsom salt soaks.  Follow up will be in 2 months but he is aware that he can follow up sooner should his symptoms unexpectedly worsen.    I spent a total of 20 minutes on the day of the visit.This includes face to face time and non-face to face time preparing to see the patient (eg, review of tests), obtaining and/or reviewing separately obtained history, documenting clinical information in the electronic or other health record, independently interpreting results and communicating results to the patient/family/caregiver, or care coordinator.      Medications Ordered This Encounter   Medications    diclofenac sodium (VOLTAREN) 1 % Gel     Sig: Apply 2 g topically 4 (four) times daily.     Dispense:  150 g     Refill:  1    naproxen (NAPROSYN) 500 MG tablet     Sig: Take 1 tablet (500 mg total) by mouth 2 (two) times daily with meals.     Dispense:  60 tablet     Refill:  1    tiZANidine (ZANAFLEX) 4 MG tablet     Sig: TAKE 1 TABLET BY  MOUTH EVERY NIGHT AS NEEDED FOR PAIN OR SPASM     Dispense:  20 tablet     Refill:  0     Patient Instructions: Daily home exercises/warm soaks   Restrictions: Regular Duty  Follow up in about 2 months (around 8/21/2023).

## 2023-06-21 NOTE — LETTER
[facility]  [address] [address]  Phone: [phone]  Fax: [phone]  [facility]: [id number]    Pt Name: Mehul Randhawa  Injury Date: 08/25/2017   Employee ID: [id number] Date of First Treatment: 06/21/2023   Company: [employer]      Appointment Time: 09:00 AM Arrived: 9:13am   Provider: Sheldon Martínez, DO Time Out: 11:00am     Office Treatment:   1. Chronic midline low back pain without sciatica    2. Encounter related to worker's compensation claim    3. Osteoarthritis of lumbar spine, unspecified spinal osteoarthritis complication status    4. Strain of lumbar region, subsequent encounter    5. Acute exacerbation of chronic low back pain      Medications Ordered This Encounter   Medications    diclofenac sodium (VOLTAREN) 1 % Gel    naproxen (NAPROSYN) 500 MG tablet    tiZANidine (ZANAFLEX) 4 MG tablet      Patient Instructions: Daily home exercises/warm soaks    Restrictions: Regular Duty     Return Appointment: 8-21-23 at 9:15am

## 2023-07-24 ENCOUNTER — OFFICE VISIT (OUTPATIENT)
Dept: URGENT CARE | Facility: CLINIC | Age: 53
End: 2023-07-24
Payer: COMMERCIAL

## 2023-07-24 VITALS
SYSTOLIC BLOOD PRESSURE: 133 MMHG | HEART RATE: 85 BPM | BODY MASS INDEX: 23.74 KG/M2 | HEIGHT: 62 IN | WEIGHT: 129 LBS | OXYGEN SATURATION: 98 % | TEMPERATURE: 98 F | DIASTOLIC BLOOD PRESSURE: 88 MMHG

## 2023-07-24 DIAGNOSIS — Z02.6 ENCOUNTER RELATED TO WORKER'S COMPENSATION CLAIM: ICD-10-CM

## 2023-07-24 DIAGNOSIS — M77.8 TENDONITIS OF RIGHT HAND: Primary | ICD-10-CM

## 2023-07-24 PROCEDURE — 99214 PR OFFICE/OUTPT VISIT, EST, LEVL IV, 30-39 MIN: ICD-10-PCS | Mod: S$GLB,,,

## 2023-07-24 PROCEDURE — 99214 OFFICE O/P EST MOD 30 MIN: CPT | Mod: S$GLB,,,

## 2023-07-24 NOTE — LETTER
[facility]  [address] [address]  Phone: [phone]  Fax: [phone]  [facility]: [id number]    Pt Name: Mehul Randhawa  Injury Date: 02/15/2023   Employee ID:  Date of First Treatment: 07/24/2023   Company: [employer]      Appointment Time: 12:00 PM Arrived:    Provider: Sheldon Martínez DO Time Out:     Office Treatment:   1. Tendonitis of right hand    2. Encounter related to worker's compensation claim               Restrictions: Regular Duty     Return Appointment: 9/25/2023 at 8:30

## 2023-07-24 NOTE — LETTER
[facility]  [address] [address]  Phone: [phone]  Fax: [phone]  [facility]: [id number]    Pt Name: Mehul Randhawa  Injury Date: 02/15/2023   Employee ID: [id number] Date of Treatment: 07/24/2023   Company: [employer]      Appointment Time: 12:00 PM Arrived: 1215   Provider: Sheldon Martínez, DO Time Out:1;50     Office Treatment:   1. Tendonitis of right hand    2. Encounter related to worker's compensation claim               No Restrictions: Regular Duty     Return Appointment: 8/21/2023 at 9:00

## 2023-07-24 NOTE — PROGRESS NOTES
Patient's mom notified and was transferred to schedule physical appointment.    Subjective:      Patient ID: Mehul Randhawa is a 52 y.o. male.    Chief Complaint: Hand Injury (Right)    See MA note.  Follow-up evaluation regarding right hand injury that occurred in February of this year.  On the date of injury, he was lifting an item and experienced some pain over the distal right 4th metacarpal area.  Treatment up to this point has been conservative.  He had been experiencing some pain to the above-mentioned area with grasping for short period of time.  In particular, he described using his mower and weed eater for short periods of time with increased symptoms to the area.  During his last evaluation it was suggested that physical therapy be may be needed but Mehul and the treating provider wanted to give additional time to see whether his symptoms would improve.  Today he states symptoms are much improved and he is starting to tolerate lawn duties that previously caused increased right hand symptoms.  Today he states his pain level is proximally 3/10 whereas pain had been at 8/10 during his last visit.    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     Constitution: Negative. Negative for generalized weakness.   HENT: Negative.     Neck: neck negative.   Cardiovascular: Negative.    Eyes: Negative.    Respiratory: Negative.     Gastrointestinal: Negative.    Endocrine: negative.   Genitourinary: Negative.    Musculoskeletal:  Positive for pain, joint pain and back pain. Negative for joint swelling, abnormal ROM of joint, muscle cramps and muscle ache.   Skin:  Negative.    Neurological: Negative.  Negative for numbness and tingling.   Psychiatric/Behavioral:  Negative for sleep disturbance.    Objective:     Physical Exam  Vitals and nursing note reviewed.   Constitutional:       General: He is not in acute distress.     Appearance: Normal appearance.   HENT:      Head: Normocephalic.   Eyes:      General: Lids are normal.      Conjunctiva/sclera: Conjunctivae normal.   Musculoskeletal:      Right wrist: No swelling or deformity. Normal range of motion.      Right hand: No swelling, deformity, tenderness or bony tenderness. Decreased range of motion. Normal strength. Normal capillary refill. Normal pulse.      Cervical back: No pain with movement.      Comments: No gross deformity noted to the right hand or wrist.  No overlying skin changes such as swelling, erythema, or bruising.  He describes a tightness sensation over his 2nd through 5th MCP joints when attempting to form a fist + when attempting to touch his right thumb to the 4th or 5th fingertips.  There is no tenderness over the metacarpals. N/V intact.  Negative Finkelstein's testing   Skin:     General: Skin is warm.      Capillary Refill: Capillary refill takes less than 2 seconds.   Neurological:      General: No focal deficit present.      Mental Status: He is alert and oriented to person, place, and time.   Psychiatric:         Attention and Perception: Attention normal.         Mood and Affect: Mood and affect normal.         Speech: Speech normal.         Behavior: Behavior normal. Behavior is cooperative.      Assessment:      1. Tendonitis of right hand    2. Encounter related to worker's compensation claim      Plan:   Today, I reviewed the past medical records related to his right hand injury.  There has been gradual improvement in his symptoms and I believe he will continue to get better with just conservative care.  He is able to delegate out some duties that require continuous gripping to avoid  aggravating his symptoms.  Therefore, he may return to work at regular duty.  He has been using an exercise ball to help with his right hand mobility and strength and I encouraged him to continue to do so.  Since he is improved so much in the last 2 months he is willing to continue this conservative treatment route for an additional 2 months.  Follow-up in September and hopefully we will be able to discharge him from Occupational Health at that time    I spent a total of 30 minutes on the day of the visit.This includes face to face time and non-face to face time preparing to see the patient (eg, review of tests), obtaining and/or reviewing separately obtained history, documenting clinical information in the electronic or other health record, independently interpreting results and communicating results to the patient/family/caregiver, or care coordinator.            Restrictions: Regular Duty  Follow up in about 2 months (around 9/24/2023).

## 2023-08-21 ENCOUNTER — OFFICE VISIT (OUTPATIENT)
Dept: URGENT CARE | Facility: CLINIC | Age: 53
End: 2023-08-21
Payer: COMMERCIAL

## 2023-08-21 VITALS
HEART RATE: 67 BPM | HEIGHT: 62 IN | OXYGEN SATURATION: 97 % | TEMPERATURE: 98 F | WEIGHT: 129 LBS | DIASTOLIC BLOOD PRESSURE: 85 MMHG | SYSTOLIC BLOOD PRESSURE: 142 MMHG | RESPIRATION RATE: 16 BRPM | BODY MASS INDEX: 23.74 KG/M2

## 2023-08-21 DIAGNOSIS — G89.29 CHRONIC MIDLINE LOW BACK PAIN WITHOUT SCIATICA: Primary | ICD-10-CM

## 2023-08-21 DIAGNOSIS — M54.50 CHRONIC MIDLINE LOW BACK PAIN WITHOUT SCIATICA: Primary | ICD-10-CM

## 2023-08-21 DIAGNOSIS — Z02.6 ENCOUNTER RELATED TO WORKER'S COMPENSATION CLAIM: ICD-10-CM

## 2023-08-21 PROCEDURE — 99214 OFFICE O/P EST MOD 30 MIN: CPT | Mod: S$GLB,,,

## 2023-08-21 PROCEDURE — 99214 PR OFFICE/OUTPT VISIT, EST, LEVL IV, 30-39 MIN: ICD-10-PCS | Mod: S$GLB,,,

## 2023-08-21 NOTE — PROGRESS NOTES
Subjective:      Patient ID: Mehul Randhawa is a 52 y.o. male.    Chief Complaint: Back Pain    Mehul returns today regarding his chronic lower back pain.  Symptoms have been stable over the past couple months but he did have a flare-up proximally 3 days ago where he had increased spasms to his left lower back and buttock region.  He continued with his medication regimen and stretching in his feeling improved today albeit still a bit sore.  Overall he states his chronic lower back condition has been stable despite the occasional aggravation.    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 Negative for generalized weakness.   HENT: Negative.     Neck: neck negative.   Cardiovascular: Negative.    Respiratory: Negative.     Endocrine: negative.   Musculoskeletal:  Positive for back pain. Negative for abnormal ROM of joint.   Skin: Negative.    Neurological: Negative.  Negative for tingling.     Objective:     Physical Exam  Vitals and nursing note reviewed.   Constitutional:       General: He is not in acute distress.  HENT:      Head: Normocephalic.   Eyes:      General: Lids are normal.      Conjunctiva/sclera: Conjunctivae normal.   Musculoskeletal:      Cervical back: No pain with movement.      Lumbar back: No swelling, deformity, spasms or tenderness. Decreased range of motion. Negative right straight leg raise test and negative left straight leg raise test.        Back:       Comments: No gross deformity noted to the lumbar spine.  No overlying skin changes such as swelling, erythema, bruising.  Area of tenderness is slightly larger today including little more to the left lateral and upper left buttock area.  Decreased range of motion with flexion  with increased pain to the left lateral lumbar area with flexion and with squatting type activity.  Left lower extremity strength is slightly weaker than the right.  Otherwise, he is able to climb on and off the examination table without difficulty or assistance.   Skin:     General: Skin is warm.      Capillary Refill: Capillary refill takes less than 2 seconds.   Neurological:      Mental Status: He is alert and oriented to person, place, and time.      Deep Tendon Reflexes: Reflexes are normal and symmetric.   Psychiatric:         Attention and Perception: Attention normal.         Mood and Affect: Mood and affect normal.         Speech: Speech normal.         Behavior: Behavior normal. Behavior is cooperative.        Assessment:      1. Chronic midline low back pain without sciatica    2. Encounter related to worker's compensation claim      Plan:   His recent flare-up appears to be settling with the use of medication home stretching.  His lower back condition has been chronic and he is aware that he will have these periodic aggravation.  He indicates no medication refills necessary at this time.  We will maintain same work duty status and reassess in approximately 2 months.  He is aware that should his condition unexpectedly worsen that he may return to our office for further evaluation.    I spent a total of 35 minutes on the day of the visit.This includes face to face time and non-face to face time preparing to see the patient (eg, review of tests), obtaining and/or reviewing separately obtained history, documenting clinical information in the electronic or other health record, independently interpreting results and communicating results to the patient/family/caregiver, or care coordinator.            Restrictions: Regular Duty  Follow up in about 2 months (around 10/21/2023).

## 2023-08-21 NOTE — LETTER
[facility]  [address] [address]  Phone: [phone]  Fax: [phone]  [facility]: [id number]    Pt Name: Mehul Randhawa  Injury Date: 08/25/2017   Employee ID: [id number] Date of Treatment: 08/21/2023   Company: [employer]      Appointment Time: 08:30 AM Arrived: 8:30 AM   Provider: Sheldon Martínez DO Time Out:10:32 AM      Office Treatment:   1. Chronic midline low back pain without sciatica    2. Encounter related to worker's compensation claim               Restrictions: Regular Duty     Return Appointment: 9/25/2023 at 9:15 AM BRE

## 2023-09-25 ENCOUNTER — OFFICE VISIT (OUTPATIENT)
Dept: URGENT CARE | Facility: CLINIC | Age: 53
End: 2023-09-25
Payer: COMMERCIAL

## 2023-09-25 VITALS
RESPIRATION RATE: 16 BRPM | DIASTOLIC BLOOD PRESSURE: 69 MMHG | HEART RATE: 72 BPM | OXYGEN SATURATION: 98 % | HEIGHT: 62 IN | BODY MASS INDEX: 23.55 KG/M2 | SYSTOLIC BLOOD PRESSURE: 118 MMHG | WEIGHT: 128 LBS

## 2023-09-25 DIAGNOSIS — M77.8 TENDONITIS OF RIGHT HAND: Primary | ICD-10-CM

## 2023-09-25 DIAGNOSIS — Z02.6 ENCOUNTER RELATED TO WORKER'S COMPENSATION CLAIM: ICD-10-CM

## 2023-09-25 PROCEDURE — 99212 OFFICE O/P EST SF 10 MIN: CPT | Mod: S$GLB,,,

## 2023-09-25 PROCEDURE — 99212 PR OFFICE/OUTPT VISIT, EST, LEVL II, 10-19 MIN: ICD-10-PCS | Mod: S$GLB,,,

## 2023-09-25 NOTE — LETTER
[facility]  [address] [address]  Phone: [phone]  Fax: [phone]  [facility]: [id number]    Pt Name: Mehul Randhawa  Injury Date: 02/15/2023   Employee ID:  Date of Treatment: 09/25/2023   Company: [employer]      Appointment Time:  08:30 AM Arrived: 08:29 AM   Provider: Sheldon Martínez DO Time Out:09:05 AM     Office Treatment:   1. Tendonitis of right hand    2. Encounter related to worker's compensation claim               Restrictions: Regular Duty, Discharged from Occupational Health     SH

## 2023-09-25 NOTE — PROGRESS NOTES
Subjective:      Patient ID: Mehul Randhawa is a 53 y.o. male.    Chief Complaint: Hand Injury    See MA note.  Follow-up evaluation for his right hand injury.  During my last evaluation with Mehul is right-hand symptoms are gradually improving with conservative care and some home exercises that he was performing.  Today, he states no symptoms to his right hand has he gradually improved over the past few weeks.  Previously noted he had increased right hand pain as he touch his thumb to the right ring and pinky fingers, but this is now resolved as well.    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 Negative for generalized weakness.   HENT: Negative.     Neck: neck negative.   Cardiovascular: Negative.    Eyes: Negative.    Respiratory: Negative.     Gastrointestinal: Negative.    Endocrine: negative.   Genitourinary: Negative.    Musculoskeletal:  Positive for pain, joint pain and back pain. Negative for joint swelling, abnormal ROM of joint, muscle cramps and muscle ache.   Skin: Negative.    Neurological: Negative.  Negative for numbness and tingling.   Psychiatric/Behavioral:  Negative for sleep disturbance.      Objective:     Physical Exam  Vitals and nursing note reviewed.   Constitutional:       General: He is not in acute distress.     Appearance: Normal appearance.   HENT:      Head: Normocephalic.   Eyes:      General: Lids are normal.      Conjunctiva/sclera: Conjunctivae normal.   Musculoskeletal:      Right wrist: Normal. No swelling, deformity or tenderness. Normal range of motion. Normal pulse.      Left wrist: Normal. No swelling, deformity or tenderness. Normal range of motion. Normal pulse.      Right hand: Normal. No swelling, deformity, tenderness or bony  tenderness. Normal range of motion. Normal strength. Normal sensation. Normal capillary refill. Normal pulse.      Left hand: Normal. No swelling, deformity, tenderness or bony tenderness. Normal range of motion. Normal strength. Normal sensation. Normal capillary refill. Normal pulse.      Cervical back: No pain with movement.      Comments: Negative Finkelstein's testing bilaterally   Skin:     General: Skin is warm.      Capillary Refill: Capillary refill takes less than 2 seconds.   Neurological:      General: No focal deficit present.      Mental Status: He is alert and oriented to person, place, and time.   Psychiatric:         Attention and Perception: Attention normal.         Mood and Affect: Mood and affect normal.         Speech: Speech normal.         Behavior: Behavior normal. Behavior is cooperative.        Assessment:      1. Tendonitis of right hand    2. Encounter related to worker's compensation claim      Plan:   Mehul is now asymptomatic and there is no functional deficits noted on examination.  Released to regular duty status and discharge from Occupational Health.  He may follow up as needed.           Restrictions: Regular Duty, Discharged from Occupational Health  Follow up if symptoms worsen or fail to improve.

## 2023-10-27 ENCOUNTER — OFFICE VISIT (OUTPATIENT)
Dept: URGENT CARE | Facility: CLINIC | Age: 53
End: 2023-10-27
Payer: COMMERCIAL

## 2023-10-27 VITALS
OXYGEN SATURATION: 97 % | RESPIRATION RATE: 16 BRPM | HEART RATE: 67 BPM | SYSTOLIC BLOOD PRESSURE: 127 MMHG | HEIGHT: 64 IN | TEMPERATURE: 98 F | WEIGHT: 125 LBS | BODY MASS INDEX: 21.34 KG/M2 | DIASTOLIC BLOOD PRESSURE: 87 MMHG

## 2023-10-27 DIAGNOSIS — Z02.6 ENCOUNTER RELATED TO WORKER'S COMPENSATION CLAIM: ICD-10-CM

## 2023-10-27 DIAGNOSIS — S39.012D STRAIN OF LUMBAR REGION, SUBSEQUENT ENCOUNTER: ICD-10-CM

## 2023-10-27 DIAGNOSIS — M54.50 CHRONIC MIDLINE LOW BACK PAIN WITHOUT SCIATICA: Primary | ICD-10-CM

## 2023-10-27 DIAGNOSIS — G89.29 ACUTE EXACERBATION OF CHRONIC LOW BACK PAIN: ICD-10-CM

## 2023-10-27 DIAGNOSIS — G89.29 CHRONIC MIDLINE LOW BACK PAIN WITHOUT SCIATICA: Primary | ICD-10-CM

## 2023-10-27 DIAGNOSIS — M54.50 ACUTE EXACERBATION OF CHRONIC LOW BACK PAIN: ICD-10-CM

## 2023-10-27 PROCEDURE — 99213 PR OFFICE/OUTPT VISIT, EST, LEVL III, 20-29 MIN: ICD-10-PCS | Mod: S$GLB,,,

## 2023-10-27 PROCEDURE — 99213 OFFICE O/P EST LOW 20 MIN: CPT | Mod: S$GLB,,,

## 2023-10-27 RX ORDER — NAPROXEN 500 MG/1
500 TABLET ORAL 2 TIMES DAILY
Qty: 30 TABLET | Refills: 1 | Status: SHIPPED | OUTPATIENT
Start: 2023-10-27 | End: 2023-10-31

## 2023-10-27 RX ORDER — TIZANIDINE 4 MG/1
TABLET ORAL
Qty: 20 TABLET | Refills: 0 | Status: SHIPPED | OUTPATIENT
Start: 2023-10-27 | End: 2024-02-28 | Stop reason: SDUPTHER

## 2023-10-27 RX ORDER — DICLOFENAC SODIUM 10 MG/G
2 GEL TOPICAL 4 TIMES DAILY
Qty: 150 G | Refills: 1 | Status: SHIPPED | OUTPATIENT
Start: 2023-10-27 | End: 2024-02-28 | Stop reason: SDUPTHER

## 2023-10-27 NOTE — PROGRESS NOTES
Subjective:      Patient ID: Mehul Randhawa is a 53 y.o. male.    Chief Complaint: Back Pain (low)    See MA note.  Follow-up evaluation for chronic lower back pain.  His injury is well controlled with the use of oral naproxen, Zanaflex p.r.n. bedtime, and topical Voltaren gel.  He has episodes of pain and spasm to his lower back that may last 30-45 minutes but improve with the medication and with his home stretching activities.  Otherwise, he feels his lower back condition is stable.  He is requesting refill of all his medications.    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       Constitution: Negative. Negative for generalized weakness.   HENT: Negative.     Neck: neck negative.   Cardiovascular: Negative.    Eyes: Negative.    Respiratory: Negative.     Gastrointestinal: Negative.    Endocrine: negative.   Genitourinary: Negative.    Musculoskeletal:  Positive for pain, joint pain and back pain. Negative for joint swelling, abnormal ROM of joint, muscle cramps and muscle ache.   Skin: Negative.    Neurological: Negative.  Negative for numbness and tingling.   Psychiatric/Behavioral:  Negative for sleep disturbance.      Objective:     Physical Exam     Physical Exam  Vitals and nursing note reviewed.   Constitutional:       General: He is not in acute distress.  HENT:      Head: Normocephalic.   Eyes:      Conjunctiva/sclera: Conjunctivae normal.   Musculoskeletal:      Cervical back: No pain with movement.      Lumbar back: Tenderness present. No swelling, deformity or spasms. Normal range of motion. Negative right straight leg raise test and negative left straight leg raise test.        Back:       Comments: No gross deformity to lumbar spine. Very mild TTP  lower lumbar spine centrally. Pain to same area with extension only. Otherwise, full ROM all planes. 4-/5 LLE and 4+/5 RLE strength. Symmetrical LE reflexes bilaterally.   Skin:     General: Skin is warm.      Capillary Refill: Capillary refill takes less than 2 seconds.   Neurological:      General: No focal deficit present.      Mental Status: He is alert and oriented to person, place, and time.      Deep Tendon Reflexes: Reflexes are normal and symmetric.   Psychiatric:         Attention and Perception: Attention normal.         Mood and Affect: Mood and affect normal.         Speech: Speech normal.         Behavior: Behavior normal. Behavior is cooperative.        Assessment:      1. Chronic midline low back pain without sciatica    2. Encounter related to worker's compensation claim    3. Acute exacerbation of chronic low back pain    4. Strain of lumbar region, subsequent encounter      Plan:   Clinical examination is reassuring and he is performing his regular duties.  He does ask for help out work for any items that may be too heavy for lifting pushing or pulling.  Refills of his medications will be provided today.  Follow up in approximately 2 months but he is aware to return sooner should he have any unexpected worsening of his lower back condition    I spent a total of 20 minutes on the day of the visit.This includes face to face time and non-face to face time preparing to see the patient (eg, review of tests), obtaining and/or reviewing separately obtained history, documenting clinical information in the electronic or other health record, independently interpreting results and communicating results to the patient/family/caregiver, or care coordinator.      Medications Ordered This Encounter   Medications    diclofenac sodium (VOLTAREN) 1 % Gel     Sig: Apply 2 g topically 4 (four) times daily.     Dispense:  150 g     Refill:  1    naproxen (NAPROSYN) 500 MG tablet     Sig: Take 1 tablet (500 mg total)  by mouth 2 (two) times daily.     Dispense:  30 tablet     Refill:  1    tiZANidine (ZANAFLEX) 4 MG tablet     Sig: TAKE 1 TABLET BY MOUTH EVERY NIGHT AS NEEDED FOR PAIN OR SPASM     Dispense:  20 tablet     Refill:  0         Restrictions: Regular Duty  Follow up in about 2 months (around 12/27/2023).

## 2023-10-27 NOTE — LETTER
[facility]  [address] [address]  Phone: [phone]  Fax: [phone]  [facility]: [id number]    Pt Name: Mehul Randhawa  Injury Date: 08/25/2017   Employee ID: [id number] Date of Treatment: 10/27/2023   Company: [employer]      Appointment Time: 08:30 AM Arrived: 8:29 AM    Provider: Sheldon Martínez, DO Time Out:9:21 AM      Office Treatment:   1. Chronic midline low back pain without sciatica    2. Encounter related to worker's compensation claim    3. Acute exacerbation of chronic low back pain    4. Strain of lumbar region, subsequent encounter      Medications Ordered This Encounter   Medications    diclofenac sodium (VOLTAREN) 1 % Gel    naproxen (NAPROSYN) 500 MG tablet    tiZANidine (ZANAFLEX) 4 MG tablet           Restrictions: Regular Duty     Return Appointment: 12/27/2023 at 8:30 AM BRE

## 2023-10-31 DIAGNOSIS — G89.29 ACUTE EXACERBATION OF CHRONIC LOW BACK PAIN: ICD-10-CM

## 2023-10-31 DIAGNOSIS — G89.29 CHRONIC MIDLINE LOW BACK PAIN WITHOUT SCIATICA: ICD-10-CM

## 2023-10-31 DIAGNOSIS — M54.50 CHRONIC MIDLINE LOW BACK PAIN WITHOUT SCIATICA: ICD-10-CM

## 2023-10-31 DIAGNOSIS — M54.50 ACUTE EXACERBATION OF CHRONIC LOW BACK PAIN: ICD-10-CM

## 2023-10-31 DIAGNOSIS — S39.012D STRAIN OF LUMBAR REGION, SUBSEQUENT ENCOUNTER: ICD-10-CM

## 2023-10-31 RX ORDER — NAPROXEN 500 MG/1
TABLET ORAL
Qty: 60 TABLET | Refills: 1 | Status: SHIPPED | OUTPATIENT
Start: 2023-10-31 | End: 2024-02-27

## 2023-12-27 ENCOUNTER — OFFICE VISIT (OUTPATIENT)
Dept: URGENT CARE | Facility: CLINIC | Age: 53
End: 2023-12-27
Payer: COMMERCIAL

## 2023-12-27 VITALS
TEMPERATURE: 98 F | OXYGEN SATURATION: 97 % | HEIGHT: 64 IN | BODY MASS INDEX: 22.2 KG/M2 | WEIGHT: 130 LBS | SYSTOLIC BLOOD PRESSURE: 120 MMHG | RESPIRATION RATE: 16 BRPM | HEART RATE: 72 BPM | DIASTOLIC BLOOD PRESSURE: 76 MMHG

## 2023-12-27 DIAGNOSIS — G89.29 CHRONIC MIDLINE LOW BACK PAIN WITHOUT SCIATICA: Primary | ICD-10-CM

## 2023-12-27 DIAGNOSIS — M54.50 CHRONIC MIDLINE LOW BACK PAIN WITHOUT SCIATICA: Primary | ICD-10-CM

## 2023-12-27 PROCEDURE — 99213 PR OFFICE/OUTPT VISIT, EST, LEVL III, 20-29 MIN: ICD-10-PCS | Mod: S$GLB,,,

## 2023-12-27 PROCEDURE — 99213 OFFICE O/P EST LOW 20 MIN: CPT | Mod: S$GLB,,,

## 2023-12-27 NOTE — PROGRESS NOTES
Subjective:      Patient ID: Mehul Randhawa is a 53 y.o. male.    Chief Complaint: Back Pain (low)    See MA note.  Follow-up evaluation for work-related chronic lumbar injury.  His lumbar symptoms are stable at this time.  Even with the recent cold weather he has not noticed any worsening of his injury.  He still experiences intermittent flare-up which lasts no more than 2-3 days and improves with the prescribed medication and his home stretching exercises.  Denies any radicular symptoms or any bowel or bladder dysfunction.    Patient's place of employment - [employer]  Patient's job title - IP  Date of Injury - 8/25/2017  Body part injured - Low back   Current work status per last visit - Regular  Improved, same, or worse - improving  Pain Scale right now (1-10) -  3-4/10    Back Pain  Pertinent negatives include no numbness.       Constitution: Negative. Negative for generalized weakness.   HENT: Negative.     Neck: neck negative.   Cardiovascular: Negative.    Eyes: Negative.    Respiratory: Negative.     Gastrointestinal: Negative.    Endocrine: negative.   Genitourinary: Negative.    Musculoskeletal:  Positive for pain, joint pain and back pain. Negative for joint swelling, abnormal ROM of joint, muscle cramps and muscle ache.   Skin: Negative.    Neurological: Negative.  Negative for numbness and tingling.   Psychiatric/Behavioral:  Negative for sleep disturbance.      Objective:     Physical Exam  Vitals and nursing note reviewed.   Constitutional:       General: He is not in acute distress.  HENT:      Head: Normocephalic.   Eyes:      Conjunctiva/sclera: Conjunctivae normal.   Musculoskeletal:      Cervical back: No pain with movement.      Lumbar back: Tenderness present. No swelling, deformity or spasms. Normal range of motion. Negative right straight leg raise test and negative left straight leg raise test.        Back:       Comments: No gross deformity to lumbar spine. Very mild soreness lower lumbar spine  centrally both on palpation and lumbar sidebending/rotation. Otherwise, full ROM all planes. 4-/5 LLE and 4+/5 RLE strength. Full painless squat. Symmetrical LE reflexes bilaterally.   Skin:     General: Skin is warm.      Capillary Refill: Capillary refill takes less than 2 seconds.   Neurological:      General: No focal deficit present.      Mental Status: He is alert and oriented to person, place, and time.      Deep Tendon Reflexes: Reflexes are normal and symmetric.   Psychiatric:         Attention and Perception: Attention normal.         Mood and Affect: Mood and affect normal.         Speech: Speech normal.         Behavior: Behavior normal. Behavior is cooperative.     Assessment:      1. Chronic midline low back pain without sciatica      Plan:   Past medical records related to this work-related injury were reviewed today.  Chronic lumbar injury is stable at this time.  He does not need a refill of his medications.  I have encouraged him to continue with his home exercises and to take his medication as prescribed.  He is aware that he can follow-up at any time should he have any aggravation of his lumbar symptoms that do not improve with the current treatment plan.  Otherwise, I will have him follow up in approximately 2 months for re-evaluation.  Continue with regular duty status    I spent a total of 25 minutes on the day of the visit.This includes face to face time and non-face to face time preparing to see the patient (eg, review of tests), obtaining and/or reviewing separately obtained history, documenting clinical information in the electronic or other health record, independently interpreting results and communicating results to the patient/family/caregiver, or care coordinator.             Restrictions: Regular Duty  Follow up in about 2 months (around 2/27/2024).

## 2023-12-27 NOTE — LETTER
[facility]  [address] [address]  Phone: [phone]  Fax: [phone]  [facility]: [id number]    Pt Name: Mehul Randhawa  Injury Date: 08/25/2017   Employee ID: [id number] Date of Treatment: 12/27/2023   Company: [employer]      Appointment Time: 08:30 AM Arrived: 8:30 AM     Provider: Sheldon Martínez DO Time Out:9:09 AM     Office Treatment:   1. Chronic midline low back pain without sciatica               Restrictions: Regular Duty     Return Appointment: 2/27/2024 at 9:00 AM BRE

## 2024-02-27 DIAGNOSIS — S39.012D STRAIN OF LUMBAR REGION, SUBSEQUENT ENCOUNTER: ICD-10-CM

## 2024-02-27 DIAGNOSIS — G89.29 CHRONIC MIDLINE LOW BACK PAIN WITHOUT SCIATICA: ICD-10-CM

## 2024-02-27 DIAGNOSIS — G89.29 ACUTE EXACERBATION OF CHRONIC LOW BACK PAIN: ICD-10-CM

## 2024-02-27 DIAGNOSIS — M54.50 CHRONIC MIDLINE LOW BACK PAIN WITHOUT SCIATICA: ICD-10-CM

## 2024-02-27 DIAGNOSIS — M54.50 ACUTE EXACERBATION OF CHRONIC LOW BACK PAIN: ICD-10-CM

## 2024-02-27 RX ORDER — NAPROXEN 500 MG/1
500 TABLET ORAL 2 TIMES DAILY WITH MEALS
Qty: 60 TABLET | Refills: 1 | Status: SHIPPED | OUTPATIENT
Start: 2024-02-27 | End: 2024-02-28 | Stop reason: SDUPTHER

## 2024-02-28 ENCOUNTER — OFFICE VISIT (OUTPATIENT)
Dept: URGENT CARE | Facility: CLINIC | Age: 54
End: 2024-02-28
Payer: COMMERCIAL

## 2024-02-28 VITALS
HEART RATE: 76 BPM | WEIGHT: 131 LBS | OXYGEN SATURATION: 99 % | SYSTOLIC BLOOD PRESSURE: 115 MMHG | BODY MASS INDEX: 22.36 KG/M2 | HEIGHT: 64 IN | RESPIRATION RATE: 18 BRPM | DIASTOLIC BLOOD PRESSURE: 75 MMHG

## 2024-02-28 DIAGNOSIS — S39.012D STRAIN OF LUMBAR REGION, SUBSEQUENT ENCOUNTER: ICD-10-CM

## 2024-02-28 DIAGNOSIS — M54.50 ACUTE EXACERBATION OF CHRONIC LOW BACK PAIN: ICD-10-CM

## 2024-02-28 DIAGNOSIS — G89.29 ACUTE EXACERBATION OF CHRONIC LOW BACK PAIN: ICD-10-CM

## 2024-02-28 DIAGNOSIS — Z02.6 ENCOUNTER RELATED TO WORKER'S COMPENSATION CLAIM: ICD-10-CM

## 2024-02-28 DIAGNOSIS — G89.29 CHRONIC MIDLINE LOW BACK PAIN WITHOUT SCIATICA: Primary | ICD-10-CM

## 2024-02-28 DIAGNOSIS — M54.50 CHRONIC MIDLINE LOW BACK PAIN WITHOUT SCIATICA: Primary | ICD-10-CM

## 2024-02-28 PROCEDURE — 99213 OFFICE O/P EST LOW 20 MIN: CPT | Mod: S$GLB,,,

## 2024-02-28 RX ORDER — DICLOFENAC SODIUM 10 MG/G
2 GEL TOPICAL 4 TIMES DAILY
Qty: 150 G | Refills: 1 | Status: SHIPPED | OUTPATIENT
Start: 2024-02-28

## 2024-02-28 RX ORDER — TIZANIDINE 4 MG/1
TABLET ORAL
Qty: 20 TABLET | Refills: 0 | Status: SHIPPED | OUTPATIENT
Start: 2024-02-28

## 2024-02-28 RX ORDER — NAPROXEN 500 MG/1
500 TABLET ORAL 2 TIMES DAILY WITH MEALS
Qty: 60 TABLET | Refills: 1 | Status: SHIPPED | OUTPATIENT
Start: 2024-02-28

## 2024-02-28 RX ORDER — NAPROXEN 500 MG/1
500 TABLET ORAL 2 TIMES DAILY WITH MEALS
Qty: 60 TABLET | Refills: 1 | Status: SHIPPED | OUTPATIENT
Start: 2024-02-28 | End: 2024-02-28 | Stop reason: SDUPTHER

## 2024-02-28 NOTE — PROGRESS NOTES
Subjective:      Patient ID: Mehul Randhawa is a 53 y.o. male.    Chief Complaint: Back Pain    See MA note.  Follow-up evaluation for chronic lower back pain.  Symptoms are stable with the use of the medication with occasional flare-ups.  When these events occur, use of the medication and home stretching helps to reduce his symptoms.  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 Negative for generalized weakness.   HENT: Negative.     Neck: neck negative.   Cardiovascular: Negative.    Eyes: Negative.    Respiratory: Negative.     Gastrointestinal: Negative.    Endocrine: negative.   Genitourinary: Negative.    Musculoskeletal:  Positive for pain, joint pain and back pain. Negative for joint swelling, abnormal ROM of joint, muscle cramps and muscle ache.   Skin: Negative.    Neurological: Negative.  Negative for numbness and tingling.   Psychiatric/Behavioral:  Negative for sleep disturbance.      Objective:     Physical Exam  Vitals and nursing note reviewed.   Constitutional:       General: He is not in acute distress.  HENT:      Head: Normocephalic.   Eyes:      Conjunctiva/sclera: Conjunctivae normal.   Musculoskeletal:      Cervical back: No pain with movement.      Lumbar back: Tenderness present. No swelling, deformity or spasms. Normal range of motion. Negative right straight leg raise test and negative left straight leg raise test.        Back:       Comments: No gross deformity to lumbar spine. Very mild soreness lower lumbar spine centrally both on palpation and lumbar sidebending/rotation. Otherwise, full ROM all planes. 4-/5 LLE and 4+/5 RLE strength. Full  painless squat. Symmetrical LE reflexes bilaterally.   Skin:     General: Skin is warm.      Capillary Refill: Capillary refill takes less than 2 seconds.   Neurological:      General: No focal deficit present.      Mental Status: He is alert and oriented to person, place, and time.      Deep Tendon Reflexes: Reflexes are normal and symmetric.   Psychiatric:         Attention and Perception: Attention normal.         Mood and Affect: Mood and affect normal.         Speech: Speech normal.         Behavior: Behavior normal. Behavior is cooperative.    Assessment:      1. Chronic midline low back pain without sciatica    2. Encounter related to worker's compensation claim    3. Acute exacerbation of chronic low back pain    4. Strain of lumbar region, subsequent encounter      Plan:   Clinical examination is stable today.  Refills of the 3 medications will be provided today.  Still feels capable performing his regular duties but he acknowledges that there are days when there are no assistance at work that requires him to perform some heavy lifting activities.  He is aware to monitor his positioning and use good lifting techniques and such instances.  Follow up in approximately 2 months or sooner if any worsening of his symptoms    I spent a total of 20 minutes on the day of the visit.This includes face to face time and non-face to face time preparing to see the patient (eg, review of tests), obtaining and/or reviewing separately obtained history, documenting clinical information in the electronic or other health record, independently interpreting results and communicating results to the patient/family/caregiver, or care coordinator.      Medications Ordered This Encounter   Medications    diclofenac sodium (VOLTAREN) 1 % Gel     Sig: Apply 2 g topically 4 (four) times daily.     Dispense:  150 g     Refill:  1    naproxen (NAPROSYN) 500 MG tablet     Sig: Take 1 tablet (500 mg total) by mouth 2 (two) times daily with  meals.     Dispense:  60 tablet     Refill:  1    tiZANidine (ZANAFLEX) 4 MG tablet     Sig: TAKE 1 TABLET BY MOUTH EVERY NIGHT AS NEEDED FOR PAIN OR SPASM     Dispense:  20 tablet     Refill:  0         Restrictions: Regular Duty  Follow up in about 2 months (around 4/28/2024).

## 2024-02-28 NOTE — LETTER
[facility]  [address] [address]  Phone: [phone]  Fax: [phone]  [facility]: [id number]    Pt Name: Mehul Randhawa  Injury Date: 02/15/2023   Employee ID: [id number] Date of Treatment: 02/28/2024   Company: [employer]      Appointment Time: 11:00 AM Arrived: 10:58 AM   Provider: Sheldon Martínez, DO Time Out:12:19 PM      Office Treatment:   1. Chronic midline low back pain without sciatica    2. Encounter related to worker's compensation claim    3. Acute exacerbation of chronic low back pain    4. Strain of lumbar region, subsequent encounter      Medications Ordered This Encounter   Medications    diclofenac sodium (VOLTAREN) 1 % Gel    naproxen (NAPROSYN) 500 MG tablet    tiZANidine (ZANAFLEX) 4 MG tablet           Restrictions: Regular Duty     Return Appointment: 4/29/2024 at 11:00 AM CHACHO

## 2024-03-23 ENCOUNTER — CLINICAL SUPPORT (OUTPATIENT)
Dept: OTHER | Facility: CLINIC | Age: 54
End: 2024-03-23

## 2024-03-23 DIAGNOSIS — Z00.8 ENCOUNTER FOR OTHER GENERAL EXAMINATION: ICD-10-CM

## 2024-03-27 VITALS
DIASTOLIC BLOOD PRESSURE: 79 MMHG | BODY MASS INDEX: 21.68 KG/M2 | HEIGHT: 64 IN | SYSTOLIC BLOOD PRESSURE: 131 MMHG | WEIGHT: 127 LBS

## 2024-03-27 LAB
HDLC SERPL-MCNC: 51 MG/DL
POC CHOLESTEROL, LDL (DOCK): 124 MG/DL
POC CHOLESTEROL, TOTAL: 200 MG/DL
POC GLUCOSE, FASTING: 100 MG/DL (ref 60–110)
TRIGL SERPL-MCNC: 143 MG/DL

## 2024-04-29 ENCOUNTER — OFFICE VISIT (OUTPATIENT)
Dept: URGENT CARE | Facility: CLINIC | Age: 54
End: 2024-04-29
Payer: COMMERCIAL

## 2024-04-29 VITALS
BODY MASS INDEX: 21.68 KG/M2 | HEART RATE: 67 BPM | WEIGHT: 127 LBS | SYSTOLIC BLOOD PRESSURE: 132 MMHG | OXYGEN SATURATION: 98 % | DIASTOLIC BLOOD PRESSURE: 82 MMHG | HEIGHT: 64 IN | RESPIRATION RATE: 12 BRPM

## 2024-04-29 DIAGNOSIS — M54.50 CHRONIC MIDLINE LOW BACK PAIN WITHOUT SCIATICA: Primary | ICD-10-CM

## 2024-04-29 DIAGNOSIS — G89.29 CHRONIC MIDLINE LOW BACK PAIN WITHOUT SCIATICA: Primary | ICD-10-CM

## 2024-04-29 DIAGNOSIS — Z02.6 ENCOUNTER RELATED TO WORKER'S COMPENSATION CLAIM: ICD-10-CM

## 2024-04-29 PROCEDURE — 99213 OFFICE O/P EST LOW 20 MIN: CPT | Mod: S$GLB,,,

## 2024-04-29 NOTE — PROGRESS NOTES
Patient's place of employment - [employer]  Patient's job title - Shipping -   Date of Injury - 2.15.2023  Body part injured - Back  Current work status per last visit - Regular Duty - doing okay  Improved, same, or worse - same, pain comes and goes. Uses the Naprosyn as dirtected.   Pain Scale right now (1-10) -  Subjective:2/10        Note: DOI is actually in 2017 rather than as noted above in 2023 which was a different injury. Follow-up evaluation for chronic lower back pain.  Symptoms are stable with the use of the medication with occasional flare-ups.  When these events occur, use of the medication and home stretching helps to reduce his symptoms.  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 male.    Chief Complaint: Back Injury    HPI    Constitution: Negative.   HENT: Negative.     Neck: neck negative.   Cardiovascular: Negative.    Eyes: Negative.    Respiratory: Negative.     Gastrointestinal: Negative.    Endocrine: negative.   Genitourinary: Negative.    Musculoskeletal:  Positive for back pain.   Skin: Negative.    Allergic/Immunologic: Negative.    Neurological: Negative.    Hematologic/Lymphatic: Negative.    Psychiatric/Behavioral: Negative.       Objective:     Physical Exam  Vitals and nursing note reviewed.   Constitutional:       General: He is not in acute distress.  HENT:      Head: Normocephalic.   Eyes:      Conjunctiva/sclera: Conjunctivae normal.   Musculoskeletal:      Cervical back: No pain with movement.      Lumbar back: Tenderness present. No swelling, deformity or spasms. Normal range of motion. Negative right straight leg raise test and  negative left straight leg raise test.        Back:       Comments: No gross deformity to lumbar spine. Very mild soreness lower lumbar spine centrally both on palpation and lumbar sidebending/rotation. Otherwise, full ROM all planes. 4-/5 LLE and 4+/5 RLE strength. Full painless squat. Symmetrical LE reflexes bilaterally.   Skin:     General: Skin is warm.      Capillary Refill: Capillary refill takes less than 2 seconds.   Neurological:      General: No focal deficit present.      Mental Status: He is alert and oriented to person, place, and time.      Deep Tendon Reflexes: Reflexes are normal and symmetric.   Psychiatric:         Attention and Perception: Attention normal.         Mood and Affect: Mood and affect normal.         Speech: Speech normal.         Behavior: Behavior normal. Behavior is cooperative.    Assessment:      1. Chronic midline low back pain without sciatica    2. Encounter related to worker's compensation claim      Plan:   Clinically stable and no need for refills today. Continue with regular duty status and perform HEP regularly    I spent a total of 25 minutes on the day of the visit.This includes face to face time and non-face to face time preparing to see the patient (eg, review of tests), obtaining and/or reviewing separately obtained history, documenting clinical information in the electronic or other health record, independently interpreting results and communicating results to the patient/family/caregiver, or care coordinator.             Restrictions: Regular Duty  Follow up in about 2 months (around 6/29/2024).

## 2024-04-29 NOTE — LETTER
[facility]  [address] [address]  Phone: [phone]  Fax: [phone]  [facility]: [id number]    Pt Name: Mehul Eisenberg  Injury Date: 08/25/2017   Employee ID: [id number] Date of Treatment: 04/29/2024   Company: [employer]      Appointment Time: 11:00 AM Arrived: 10:57 AM   Provider: Sheldon Martínez,  Time Out:12:29 PM     Office Treatment:   1. Chronic midline low back pain without sciatica    2. Encounter related to worker's compensation claim               Restrictions: Regular Duty     Return Appointment: 6/28/2024 at 11:00 AM BRE

## 2024-06-28 ENCOUNTER — OFFICE VISIT (OUTPATIENT)
Dept: URGENT CARE | Facility: CLINIC | Age: 54
End: 2024-06-28
Payer: COMMERCIAL

## 2024-06-28 VITALS
DIASTOLIC BLOOD PRESSURE: 71 MMHG | OXYGEN SATURATION: 97 % | RESPIRATION RATE: 18 BRPM | HEART RATE: 81 BPM | BODY MASS INDEX: 21.68 KG/M2 | SYSTOLIC BLOOD PRESSURE: 121 MMHG | HEIGHT: 64 IN | WEIGHT: 127 LBS | TEMPERATURE: 98 F

## 2024-06-28 DIAGNOSIS — M54.50 ACUTE EXACERBATION OF CHRONIC LOW BACK PAIN: ICD-10-CM

## 2024-06-28 DIAGNOSIS — G89.29 ACUTE EXACERBATION OF CHRONIC LOW BACK PAIN: ICD-10-CM

## 2024-06-28 DIAGNOSIS — G89.29 CHRONIC MIDLINE LOW BACK PAIN WITHOUT SCIATICA: ICD-10-CM

## 2024-06-28 DIAGNOSIS — S39.012D STRAIN OF LUMBAR REGION, SUBSEQUENT ENCOUNTER: ICD-10-CM

## 2024-06-28 DIAGNOSIS — Z02.6 ENCOUNTER RELATED TO WORKER'S COMPENSATION CLAIM: Primary | ICD-10-CM

## 2024-06-28 DIAGNOSIS — M54.50 CHRONIC MIDLINE LOW BACK PAIN WITHOUT SCIATICA: ICD-10-CM

## 2024-06-28 RX ORDER — TIZANIDINE 4 MG/1
TABLET ORAL
Qty: 20 TABLET | Refills: 1 | Status: SHIPPED | OUTPATIENT
Start: 2024-06-28

## 2024-06-28 RX ORDER — DICLOFENAC SODIUM 10 MG/G
2 GEL TOPICAL 4 TIMES DAILY
Qty: 150 G | Refills: 2 | Status: SHIPPED | OUTPATIENT
Start: 2024-06-28

## 2024-06-28 RX ORDER — NAPROXEN 500 MG/1
500 TABLET ORAL 2 TIMES DAILY PRN
Qty: 60 TABLET | Refills: 2 | Status: SHIPPED | OUTPATIENT
Start: 2024-06-28

## 2024-06-28 NOTE — PROGRESS NOTES
Subjective:      Patient ID: Mehul Eisenberg is a 53 y.o. male.    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 Patient taking diclofenac,naproxen, and tizanidine. Patient states while sitting he feels weakness in his left leg.    Patient is here for follow-up for chronic low back pain.  Date of injury was August 2017.  He continues to work in the [employer] department doing regular duty.  He takes Naprosyn usually once a day.  Sometimes twice a day if he is having more pain.  Tolerates well.  Takes tizanidine at bedtime as needed.  And also applies Voltaren gel.  He continues to do his home exercise program that he was taught in physical therapy.  Pain is localized to low back denies any leg pain, numbness or tingling.  Denies any bowel or bladder dysfunction.  I have reviewed most recent office visit in epic.  MWT      Low-back Pain  This is a new problem. The current episode started more than 1 year ago. The problem occurs intermittently. The problem has been unchanged. Associated symptoms include weakness. Pertinent negatives include no abdominal pain, nausea, numbness or vomiting. The symptoms are aggravated by bending, standing and walking. He has tried heat (meds) for the symptoms. The treatment provided mild relief.       Constitution: Negative for activity change.   Gastrointestinal:  Negative for abdominal pain, nausea, vomiting and bowel incontinence.   Genitourinary:  Negative for bladder incontinence.   Musculoskeletal:  Positive for back pain. Negative for abnormal ROM of joint.   Neurological:  Negative for numbness and tingling.     Objective:     Physical Exam  Vitals reviewed.   Constitutional:       General: He is not  in acute distress.     Appearance: Normal appearance.   HENT:      Right Ear: External ear normal.      Left Ear: External ear normal.   Eyes:      Conjunctiva/sclera: Conjunctivae normal.   Cardiovascular:      Pulses: Normal pulses.   Pulmonary:      Effort: Pulmonary effort is normal.   Abdominal:      General: Abdomen is flat.   Musculoskeletal:         General: No swelling.      Lumbar back: Tenderness present. No swelling, deformity or spasms. Normal range of motion. Negative right straight leg raise test and negative left straight leg raise test.        Back:       Comments: No gross deformity to lumbar spine. Mild soreness to lower back.  Some pain with flexion and extension.  No pain with bilateral bending or rotation.  No weakness to lower extremities squats well.  Neurovascular intact distally.  Heel and toe walks well.   Skin:     General: Skin is warm and dry.   Neurological:      General: No focal deficit present.      Mental Status: He is alert and oriented to person, place, and time.      Deep Tendon Reflexes:      Reflex Scores:       Patellar reflexes are 2+ on the right side and 2+ on the left side.       Achilles reflexes are 2+ on the right side and 2+ on the left side.  Psychiatric:         Mood and Affect: Mood normal.         Behavior: Behavior normal.         Thought Content: Thought content normal.         Judgment: Judgment normal.        Assessment:      1. Encounter related to worker's compensation claim    2. Chronic midline low back pain without sciatica    3. Acute exacerbation of chronic low back pain    4. Strain of lumbar region, subsequent encounter      Plan:   I have renewed his prescriptions for Naprosyn that he takes once or twice a day as needed low back pain, tizanidine at bedtime needed, and Voltaren gel daily.  He will continue his regular stretches that he learned in physical therapy.    Medications Ordered This Encounter   Medications    diclofenac sodium (VOLTAREN) 1 %  Gel     Sig: Apply 2 g topically 4 (four) times daily.     Dispense:  150 g     Refill:  2    naproxen (NAPROSYN) 500 MG tablet     Sig: Take 1 tablet (500 mg total) by mouth 2 (two) times daily as needed (take with food).     Dispense:  60 tablet     Refill:  2    tiZANidine (ZANAFLEX) 4 MG tablet     Sig: TAKE 1 TABLET BY MOUTH EVERY NIGHT AS NEEDED FOR PAIN OR SPASM     Dispense:  20 tablet     Refill:  1     Patient Instructions: Attention not to aggravate affected area, Daily home exercises/warm soaks   Restrictions: Regular Duty  Follow up in about 2 months (around 8/28/2024).  I spent a total of 30 minutes on the day of the visit.This includes face to face time and non-face to face time preparing to see the patient (eg, review of tests), obtaining and/or reviewing separately obtained history, documenting clinical information in the electronic or other health record, independently interpreting results and communicating results to the patient/family/caregiver, or care coordinator.

## 2024-06-28 NOTE — LETTER
[facility]  [address] [facility] [address]  Phone: [phone]  Fax: [phone]  [facility]: [email or web address]     Name: Mehul Eisenberg  Injury Date: 08/25/2017   Employee ID: [id number] Date of Treatment: 06/28/2024   Company: [employer]      Appointment Time: 10:45 AM Arrived: 10:58 AM   Provider: Rachael Chiu NP Time Out:12:15 PM     Office Treatment:   1. Encounter related to worker's compensation claim    2. Chronic midline low back pain without sciatica    3. Acute exacerbation of chronic low back pain    4. Strain of lumbar region, subsequent encounter      Medications Ordered This Encounter   Medications    diclofenac sodium (VOLTAREN) 1 % Gel    naproxen (NAPROSYN) 500 MG tablet    tiZANidine (ZANAFLEX) 4 MG tablet      Patient Instructions: Attention not to aggravate affected area, Daily home exercises/warm soaks    Restrictions: Regular Duty     Return Appointment: 8/28/2024 at 11:00 AM

## 2024-08-28 ENCOUNTER — OFFICE VISIT (OUTPATIENT)
Dept: URGENT CARE | Facility: CLINIC | Age: 54
End: 2024-08-28
Payer: COMMERCIAL

## 2024-08-28 VITALS
RESPIRATION RATE: 16 BRPM | HEART RATE: 77 BPM | BODY MASS INDEX: 21.68 KG/M2 | WEIGHT: 127 LBS | HEIGHT: 64 IN | DIASTOLIC BLOOD PRESSURE: 77 MMHG | TEMPERATURE: 99 F | OXYGEN SATURATION: 98 % | SYSTOLIC BLOOD PRESSURE: 124 MMHG

## 2024-08-28 DIAGNOSIS — M54.50 CHRONIC MIDLINE LOW BACK PAIN WITHOUT SCIATICA: ICD-10-CM

## 2024-08-28 DIAGNOSIS — S39.012D STRAIN OF LUMBAR REGION, SUBSEQUENT ENCOUNTER: ICD-10-CM

## 2024-08-28 DIAGNOSIS — Z13.9 ENCOUNTER FOR SCREENING: ICD-10-CM

## 2024-08-28 DIAGNOSIS — G89.29 CHRONIC MIDLINE LOW BACK PAIN WITHOUT SCIATICA: ICD-10-CM

## 2024-08-28 DIAGNOSIS — Z02.6 ENCOUNTER RELATED TO WORKER'S COMPENSATION CLAIM: Primary | ICD-10-CM

## 2024-08-28 PROCEDURE — 99214 OFFICE O/P EST MOD 30 MIN: CPT | Mod: S$GLB,,, | Performed by: NURSE PRACTITIONER

## 2024-08-28 PROCEDURE — 80053 COMPREHEN METABOLIC PANEL: CPT | Mod: QW,S$GLB,, | Performed by: NURSE PRACTITIONER

## 2024-08-28 RX ORDER — TIZANIDINE 4 MG/1
TABLET ORAL
Qty: 20 TABLET | Refills: 1 | Status: SHIPPED | OUTPATIENT
Start: 2024-08-28

## 2024-08-28 RX ORDER — NAPROXEN 500 MG/1
500 TABLET ORAL 2 TIMES DAILY PRN
Qty: 60 TABLET | Refills: 2 | Status: SHIPPED | OUTPATIENT
Start: 2024-08-28

## 2024-08-28 RX ORDER — DICLOFENAC SODIUM 10 MG/G
2 GEL TOPICAL 4 TIMES DAILY
Qty: 150 G | Refills: 2 | Status: SHIPPED | OUTPATIENT
Start: 2024-08-28

## 2024-08-28 NOTE — LETTER
[facility]  [address] [facility] [facility] [address]  Phone: [phone]  Fax: [phone]  [facility]: [id number]     Name: Mehul Eisenberg  Injury Date: 08/25/2017   Employee ID: [id number] Date of Treatment: 08/28/2024   Company: [employer]      Appointment Time: 11:00 AM Arrived: 10:56 AM    Provider: Rachael Chiu NP Time Out:12:38 PM      Office Treatment:   1. Encounter related to worker's compensation claim    2. Chronic midline low back pain without sciatica    3. Strain of lumbar region, subsequent encounter      Medications Ordered This Encounter   Medications    diclofenac sodium (VOLTAREN) 1 % Gel    naproxen (NAPROSYN) 500 MG tablet    tiZANidine (ZANAFLEX) 4 MG tablet        Patient Instructions: Attention not to aggravate affected area, Daily home exercises/warm soaks      Restrictions: Regular Duty     Return Appointment: 10/28/2024 at 1:00 AM BRE

## 2024-08-28 NOTE — PROGRESS NOTES
Subjective:      Patient ID: Mehul Eisenberg is a 54 y.o. male.    Chief Complaint: Back Pain    Patient's place of employment - [employer]  Patient's job title - IP  Date of Injury - 8/25/17  Body part injured - back  Current work status per last visit - regular  Improved, same, or worse - depends on the day  Pain Scale right now (1-10) -  1/10    Patient is here for follow-up for chronic low back pain.  Date of injury was August 2017.  He continues to work in the [employer] department doing regular duty.  He takes Naprosyn as needed, usually once a day sometimes twice.  He is tolerating it well.  Also takes tizanidine at bedtime as needed.  And he applies Voltaren gel to his low back.  He continues to do his home exercise program that he was taught in physical therapy.  Pain is usually localized to the center of the low back, lumbar region.  Occasionally has pain to the left buttock but recently has experienced pain to the right side as well.  At times he has numbness in left thigh region and feels that his legs are weak.  He does experience spasms.  His work entails doing a lot of repeated heavy lifting.  MWT    Back Pain  Associated symptoms include numbness. Pertinent negatives include no abdominal pain, bladder incontinence or bowel incontinence.       Constitution: Negative for activity change.   Cardiovascular: Negative.    Gastrointestinal:  Negative for abdominal pain, nausea, vomiting and bowel incontinence.   Genitourinary:  Negative for bladder incontinence.   Musculoskeletal:  Positive for pain and back pain. Negative for abnormal ROM of joint.   Skin:  Negative for erythema.   Neurological:  Positive for numbness. Negative for tingling.     Objective:     Physical Exam  Constitutional:       General: He is not in acute distress.     Appearance: Normal appearance. He is normal weight.   HENT:      Right Ear: External ear normal.      Left Ear: External ear normal.   Eyes:      Conjunctiva/sclera:  Conjunctivae normal.   Cardiovascular:      Pulses: Normal pulses.   Pulmonary:      Effort: Pulmonary effort is normal.   Abdominal:      General: Abdomen is flat.   Musculoskeletal:         General: Tenderness present. No swelling or deformity.      Lumbar back: Spasms and tenderness present. No swelling or deformity. Normal range of motion. Negative right straight leg raise test and negative left straight leg raise test.        Back:       Comments: Pain to lower back primarily with extension.  Also has some pain with forward flexion.  No pain with bilateral bending/rotation.  Negative bilateral straight leg raises.  Heel and toe walks well.  Neurovascular intact distally.   Skin:     General: Skin is warm and dry.      Findings: No bruising or erythema.   Neurological:      General: No focal deficit present.      Mental Status: He is alert and oriented to person, place, and time.      Deep Tendon Reflexes:      Reflex Scores:       Patellar reflexes are 2+ on the right side and 2+ on the left side.       Achilles reflexes are 2+ on the right side and 2+ on the left side.  Psychiatric:         Mood and Affect: Mood normal.         Behavior: Behavior normal.         Thought Content: Thought content normal.         Judgment: Judgment normal.        Assessment:      1. Encounter related to worker's compensation claim    2. Chronic midline low back pain without sciatica    3. Strain of lumbar region, subsequent encounter      Plan:   Patient has been taking Naprosyn chronically for several years.  He has no known history of kidney disease however, there has never been any blood work done to check on his kidney status.  He does not have a PCP.  I checked in [facility] and was unable find any previous lab work other than cholesterol measurements.  Since he does get relief with chronic use of Naprosyn, I think it is warranted and prudent to check on his kidney status to ensure he is not experiencing any renal adverse effects  from chronic use of NSAIDs that he is prescribed for his chronic LBP.  Therefore, a CMP has been ordered today.      Medications Ordered This Encounter   Medications    diclofenac sodium (VOLTAREN) 1 % Gel     Sig: Apply 2 g topically 4 (four) times daily.     Dispense:  150 g     Refill:  2    naproxen (NAPROSYN) 500 MG tablet     Sig: Take 1 tablet (500 mg total) by mouth 2 (two) times daily as needed (take with food).     Dispense:  60 tablet     Refill:  2    tiZANidine (ZANAFLEX) 4 MG tablet     Sig: TAKE 1 TABLET BY MOUTH EVERY NIGHT AS NEEDED FOR PAIN OR SPASM     Dispense:  20 tablet     Refill:  1     Patient Instructions: Attention not to aggravate affected area, Daily home exercises/warm soaks   Restrictions: Regular Duty  Follow up in about 2 months (around 10/28/2024).  I spent a total of 25 minutes on the day of the visit.This includes face to face time and non-face to face time preparing to see the patient (eg, review of tests), obtaining and/or reviewing separately obtained history, documenting clinical information in the electronic or other health record, independently interpreting results and communicating results to the patient/family/caregiver, or care coordinator.

## 2024-09-18 ENCOUNTER — TELEPHONE (OUTPATIENT)
Dept: URGENT CARE | Facility: CLINIC | Age: 54
End: 2024-09-18
Payer: COMMERCIAL

## 2024-09-18 NOTE — TELEPHONE ENCOUNTER
Attempted to call pt with lab results. No answer. VM message left requesting he return call. PHIL

## 2024-10-28 ENCOUNTER — OFFICE VISIT (OUTPATIENT)
Dept: URGENT CARE | Facility: CLINIC | Age: 54
End: 2024-10-28
Payer: COMMERCIAL

## 2024-10-28 VITALS
BODY MASS INDEX: 21.68 KG/M2 | HEIGHT: 64 IN | OXYGEN SATURATION: 97 % | SYSTOLIC BLOOD PRESSURE: 128 MMHG | DIASTOLIC BLOOD PRESSURE: 74 MMHG | HEART RATE: 72 BPM | RESPIRATION RATE: 18 BRPM | TEMPERATURE: 98 F | WEIGHT: 127 LBS

## 2024-10-28 DIAGNOSIS — G89.29 ENCOUNTER FOR CHRONIC PAIN MANAGEMENT: ICD-10-CM

## 2024-10-28 DIAGNOSIS — Z02.6 ENCOUNTER RELATED TO WORKER'S COMPENSATION CLAIM: ICD-10-CM

## 2024-10-28 DIAGNOSIS — S39.012D STRAIN OF LUMBAR REGION, SUBSEQUENT ENCOUNTER: ICD-10-CM

## 2024-10-28 DIAGNOSIS — G89.29 CHRONIC MIDLINE LOW BACK PAIN WITHOUT SCIATICA: Primary | ICD-10-CM

## 2024-10-28 DIAGNOSIS — M54.50 CHRONIC MIDLINE LOW BACK PAIN WITHOUT SCIATICA: Primary | ICD-10-CM

## 2024-10-28 PROCEDURE — 99214 OFFICE O/P EST MOD 30 MIN: CPT | Mod: S$GLB,,, | Performed by: SURGERY

## 2024-10-28 RX ORDER — DICLOFENAC SODIUM 10 MG/G
2 GEL TOPICAL 4 TIMES DAILY
Qty: 150 G | Refills: 2 | Status: SHIPPED | OUTPATIENT
Start: 2024-10-28

## 2024-10-28 RX ORDER — TIZANIDINE 4 MG/1
TABLET ORAL
Qty: 20 TABLET | Refills: 2 | Status: SHIPPED | OUTPATIENT
Start: 2024-10-28

## 2024-10-28 RX ORDER — NAPROXEN 500 MG/1
500 TABLET ORAL 2 TIMES DAILY PRN
Qty: 60 TABLET | Refills: 2 | Status: SHIPPED | OUTPATIENT
Start: 2024-10-28

## 2024-12-30 ENCOUNTER — OFFICE VISIT (OUTPATIENT)
Dept: URGENT CARE | Facility: CLINIC | Age: 54
End: 2024-12-30
Payer: COMMERCIAL

## 2024-12-30 VITALS
WEIGHT: 132 LBS | OXYGEN SATURATION: 98 % | SYSTOLIC BLOOD PRESSURE: 120 MMHG | BODY MASS INDEX: 22.53 KG/M2 | TEMPERATURE: 98 F | DIASTOLIC BLOOD PRESSURE: 77 MMHG | HEART RATE: 78 BPM | HEIGHT: 64 IN | RESPIRATION RATE: 18 BRPM

## 2024-12-30 DIAGNOSIS — Z00.00 HEALTHCARE MAINTENANCE: ICD-10-CM

## 2024-12-30 DIAGNOSIS — S39.012D STRAIN OF LUMBAR REGION, SUBSEQUENT ENCOUNTER: ICD-10-CM

## 2024-12-30 DIAGNOSIS — G89.29 CHRONIC MIDLINE LOW BACK PAIN WITHOUT SCIATICA: ICD-10-CM

## 2024-12-30 DIAGNOSIS — Z02.6 ENCOUNTER RELATED TO WORKER'S COMPENSATION CLAIM: Primary | ICD-10-CM

## 2024-12-30 DIAGNOSIS — G89.29 ENCOUNTER FOR CHRONIC PAIN MANAGEMENT: ICD-10-CM

## 2024-12-30 DIAGNOSIS — M54.50 CHRONIC MIDLINE LOW BACK PAIN WITHOUT SCIATICA: ICD-10-CM

## 2024-12-30 RX ORDER — TIZANIDINE 4 MG/1
TABLET ORAL
Qty: 20 TABLET | Refills: 2 | Status: SHIPPED | OUTPATIENT
Start: 2024-12-30

## 2024-12-30 RX ORDER — DICLOFENAC SODIUM 10 MG/G
2 GEL TOPICAL 4 TIMES DAILY
Qty: 150 G | Refills: 2 | Status: SHIPPED | OUTPATIENT
Start: 2024-12-30

## 2024-12-30 RX ORDER — NAPROXEN 500 MG/1
500 TABLET ORAL 2 TIMES DAILY PRN
Qty: 60 TABLET | Refills: 2 | Status: SHIPPED | OUTPATIENT
Start: 2024-12-30

## 2024-12-30 NOTE — LETTER
[facility]  [address] [address]  Phone: [phone]  Fax: [phone]  [facility]: [phone]     Name: Mehul Eisenberg  Injury Date: 08/25/2017   Employee ID: [id number] Date of Treatment: 12/30/2024   Company: [employer]      Appointment Time: 10:45 AM Arrived: 11:00 AM   Provider: Rachael Chiu NP Time Out: 12:20 PM     Office Treatment:   1. Encounter related to worker's compensation claim    2. Chronic midline low back pain without sciatica    3. Strain of lumbar region, subsequent encounter    4. Encounter for chronic pain management      Medications Ordered This Encounter   Medications    diclofenac sodium (VOLTAREN) 1 % Gel    naproxen (NAPROSYN) 500 MG tablet    tiZANidine (ZANAFLEX) 4 MG tablet          Patient Instructions: Attention not to aggravate affected area, Daily home exercises/warm soaks        Restrictions: Regular Duty     Return Appointment: 2/28/2025 at 11:00 AM    CRYSTAL

## 2024-12-30 NOTE — PROGRESS NOTES
Subjective:      Patient ID: Mehul Eisenberg is a 54 y.o. male.    Chief Complaint: Back Pain    Patient's place of employment - [employer]  Patient's job title - Shipping  Date of Injury - 8/25/2017  Body part injured - back  Current work status per last visit - regular  Improved, same, or worse - worse  Pain Scale right now (1-10) -  4/10    Pt reports he has been unable to fill Voltaren prescription. Pt reports back pain is worse since last visit, continues working regular duty. Pt reports taking naproxe 2x daily when needed, 1x daily otherwise. Pt reports continued PT stretches.  AL    Pt reports that he has some weeks with more pain than others. Pain described as a burning pain. Denies radiation of pain. No pain in LE, no numbness, tingling, bowel or bladder dysfunction.  He continues to do regular duty. When he has a more painful week he takes Naprosyn twice a day as well as Tizanidine. Tolerates well. Has had trouble getting the Voltaren gel Rx filled. MWT        Gastrointestinal:  Negative for abdominal pain, nausea, vomiting and bowel incontinence.   Genitourinary:  Negative for bladder incontinence.   Musculoskeletal:  Positive for pain and back pain.   Skin:  Negative for erythema.   Neurological:  Negative for numbness and tingling.     Objective:     Physical Exam  Constitutional:       General: He is not in acute distress.     Appearance: Normal appearance. He is normal weight.   HENT:      Right Ear: External ear normal.      Left Ear: External ear normal.   Eyes:      Conjunctiva/sclera: Conjunctivae normal.   Cardiovascular:      Pulses: Normal pulses.   Pulmonary:      Effort: Pulmonary effort is normal.   Abdominal:      General: Abdomen is flat.   Musculoskeletal:         General: Tenderness present. No swelling.      Lumbar back: Spasms and tenderness present. No swelling or deformity. Normal range of motion. Negative right straight leg raise test and negative left straight leg raise test.         Back:       Comments: Continues to have non radiating LBP. Increased pain with extension. Mild pain with bilateral bending. Neg SLRs. NV intact distally. Heel & toe walks well.   Skin:     General: Skin is warm and dry.      Findings: No bruising or erythema.   Neurological:      General: No focal deficit present.      Mental Status: He is alert and oriented to person, place, and time.      Deep Tendon Reflexes:      Reflex Scores:       Patellar reflexes are 2+ on the right side and 2+ on the left side.       Achilles reflexes are 2+ on the right side and 2+ on the left side.  Psychiatric:         Mood and Affect: Mood normal.         Behavior: Behavior normal.         Thought Content: Thought content normal.         Judgment: Judgment normal.        Assessment:      1. Encounter related to worker's compensation claim    2. Chronic midline low back pain without sciatica    3. Strain of lumbar region, subsequent encounter    4. Encounter for chronic pain management    5. Healthcare maintenance      Plan:   I have once again encouraged pt to have a PCP for his regular, non work related health maintenance. I have referred him to Family Medicine. He has no h/o kidney dx. Naprosyn was refilled today. He had CMP done in August 2024 to ensure he is not having any renal issues associated with long term NSAID use. CMP result is scanned into the media file. He has been taking Naprosyn 500 once or twice a day as needed with relief and tolerating well.Rx for Tizanidine and Voltaren gel have also been renewed. Told him to try the pharmacy at [facility] to get the Voltaren gel Rx filled. If he still has a problem filling the Rx to talk to his worker's comp . He is reluctant to pay for the medication then get reimbursed by employer.    He will continue to do the HEP prescribed by PT (which he does daily).    Medications Ordered This Encounter   Medications    diclofenac sodium (VOLTAREN) 1 % Gel     Sig: Apply 2 g  topically 4 (four) times daily.     Dispense:  150 g     Refill:  2    naproxen (NAPROSYN) 500 MG tablet     Sig: Take 1 tablet (500 mg total) by mouth 2 (two) times daily as needed (take with food).     Dispense:  60 tablet     Refill:  2    tiZANidine (ZANAFLEX) 4 MG tablet     Sig: TAKE 1 TABLET BY MOUTH EVERY NIGHT AS NEEDED FOR PAIN OR SPASM     Dispense:  20 tablet     Refill:  2     Patient Instructions: Attention not to aggravate affected area, Daily home exercises/warm soaks   Restrictions: Regular Duty  Follow up in about 2 months (around 2/28/2025).    I spent a total of 20 minutes on the day of the visit.This includes face to face time and non-face to face time preparing to see the patient (eg, review of tests), obtaining and/or reviewing separately obtained history, documenting clinical information in the electronic or other health record, independently interpreting results and communicating results to the patient/family/caregiver, or care coordinator.

## 2025-02-28 ENCOUNTER — OFFICE VISIT (OUTPATIENT)
Dept: URGENT CARE | Facility: CLINIC | Age: 55
End: 2025-02-28
Payer: COMMERCIAL

## 2025-02-28 VITALS
BODY MASS INDEX: 22.53 KG/M2 | SYSTOLIC BLOOD PRESSURE: 126 MMHG | WEIGHT: 132 LBS | HEIGHT: 64 IN | DIASTOLIC BLOOD PRESSURE: 78 MMHG | OXYGEN SATURATION: 99 % | HEART RATE: 66 BPM | RESPIRATION RATE: 19 BRPM

## 2025-02-28 DIAGNOSIS — S39.012D STRAIN OF LUMBAR REGION, SUBSEQUENT ENCOUNTER: ICD-10-CM

## 2025-02-28 DIAGNOSIS — M54.50 CHRONIC MIDLINE LOW BACK PAIN WITHOUT SCIATICA: ICD-10-CM

## 2025-02-28 DIAGNOSIS — G89.29 ENCOUNTER FOR CHRONIC PAIN MANAGEMENT: ICD-10-CM

## 2025-02-28 DIAGNOSIS — Z02.6 ENCOUNTER RELATED TO WORKER'S COMPENSATION CLAIM: Primary | ICD-10-CM

## 2025-02-28 DIAGNOSIS — G89.29 CHRONIC MIDLINE LOW BACK PAIN WITHOUT SCIATICA: ICD-10-CM

## 2025-02-28 RX ORDER — TIZANIDINE 4 MG/1
TABLET ORAL
Qty: 20 TABLET | Refills: 2 | Status: SHIPPED | OUTPATIENT
Start: 2025-02-28

## 2025-02-28 NOTE — LETTER
[facility]  [address] [address]  Phone: [phone]  Fax: [phone]  [facility]: [email or web address]    Pt Name: Mehul Eisenberg  Injury Date: 08/25/2017   Employee ID: [id number] Date of Treatment: 02/28/2025   Company: [employer]      Appointment Time: 11:00 AM Arrived: 10:58 AM    Provider: Rachael Chiu NP Time Out:12:04 PM      Office Treatment:   1. Encounter related to worker's compensation claim    2. Chronic midline low back pain without sciatica    3. Strain of lumbar region, subsequent encounter    4. Encounter for chronic pain management      Medications Ordered This Encounter   Medications    tiZANidine (ZANAFLEX) 4 MG tablet        Patient Instructions: Attention not to aggravate affected area, Daily home exercises/warm soaks, PT to be scheduled once authorized      Restrictions: Regular Duty     Return Appointment: 4/28/2025 at 11:00 AM BRE

## 2025-02-28 NOTE — PROGRESS NOTES
Subjective:      Patient ID: Mehul Eisenberg is a 54 y.o. male.    Chief Complaint: Back Pain    Patient's place of employment - [employer]  Patient's job title - Shipping  Date of Injury - 8/25/2017  Body part injured - back  Current work status per last visit - Regular Duty   Improved, same, or worse - Improving   Pain Scale right now (1-10) -  1/10  SB.     Pt states that he has been having muscle spasms in lower back.     Provider's note begins:  Patient here for revisit for chronic low back pain from injury sustained at work in 2017.  He has chronic low back pain with occasional flare-ups. However, it has recently has been managed well with medication.  He has chronic, nonradiating low back with occasional spasm in the left buttocks. He takes naproxen as needed, tizanidine as needed  (but fairly regularly) and applies Voltaren gel once or twice a day.  He was able to fill the Voltaren prescription at the [facility] pharmacy.     He had physical therapy in 2017 and he continues to do daily stretches and exercises that he was taught in PT.  He reports that recently he has been very busy at work, working 6 days a week.  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  He had considered transferring to a different, less physically demanding job however, he says most jobs at [employer] [facility] [employer] require lifting at least 50 lb.  He has been working there for 18 years and prefers to stay in his same position. A transfer to another department may negatively affect his income level. Also, he does not think moving to another department would be any less physically demanding.  Prior to this visit I did an extensive chart review.  I have reviewed the previous notes beginning with the initial injury in 2017.  I have also reviewed the 2 MRI reports. I contacted E[place] [facility] [place] was able to obtain the 2nd MRI report that was done in 2020 and it has been scanned into the media file.  Also reviewed  the available scanned physical therapy reports from [place].  Reviewed several letters in the media file that were written by Dr. Hinojosa.  MWT        Gastrointestinal:  Negative for abdominal pain, nausea, vomiting and bowel incontinence.   Genitourinary:  Negative for bladder incontinence.   Musculoskeletal:  Positive for pain, back pain and muscle ache. Negative for abnormal ROM of joint.   Skin:  Negative for erythema.   Neurological:  Negative for numbness and tingling.     Objective:     Physical Exam  Vitals and nursing note reviewed.   Constitutional:       General: He is not in acute distress.     Appearance: Normal appearance.   HENT:      Right Ear: External ear normal.      Left Ear: External ear normal.   Eyes:      Conjunctiva/sclera: Conjunctivae normal.   Cardiovascular:      Pulses: Normal pulses.   Pulmonary:      Effort: Pulmonary effort is normal.   Abdominal:      General: Abdomen is flat.   Musculoskeletal:         General: No swelling or tenderness.      Lumbar back: No swelling, deformity, spasms or tenderness. Normal range of motion. Negative right straight leg raise test and negative left straight leg raise test.        Back:       Comments: Today has mild low back pain that is nonradiating.  Occasionally has spasm to the left buttock.  Denies pain, numbness or tingling to lower extremities.  Able to forward flex to 90°, slowly.  More pain with extension 10°.  No pain with left/right lateral bending/rotation.  5/5 strength lower extremities.  Heel and toe walks well.  Negative bilateral straight leg raises.   Skin:     Findings: No bruising or erythema.   Neurological:      General: No focal deficit present.      Mental Status: He is alert and oriented to person, place, and time.   Psychiatric:         Mood and Affect: Mood normal.         Behavior: Behavior normal.         Thought Content: Thought content normal.         Judgment: Judgment normal.        Assessment:      1. Encounter  related to worker's compensation claim    2. Chronic midline low back pain without sciatica    3. Strain of lumbar region, subsequent encounter    4. Encounter for chronic pain management      Plan:   Mr. Randhawa has worked at [employer] for the past 18 years.  In August of 2017 he injured his low back at work when he was picking up heavy boxes of belts.  He had an MRI in 2017 that showed degenerative changes and multiple disc bulges but no herniation.  He had physical therapy at [place] [facility] and continues to do daily back stretches/exercises.  He has been managed with NSAIDs, muscle relaxers and topical NSAIDs.  He also used lidocaine patches for a while but this was discontinued after he developed a contact dermatitis.  Since the initial injury he has had occasional flare-ups for which he has had to go on light duty for a short period of time.  However, he has been doing his regular work for quite some time now with attention not to aggravate.    It was recommended by Dr. Hinojosa during the early course of his treatment that he transfer to a less physically demanding work position.  However, this has not worked out and the patient prefers to stay in his current position at this time.  He says that he would need to take a pay reduction if he moved to a different department and he would still need to do heavy lifting so he does not see any advantage to changing jobs.    I have [place] [facility] [place] was able to obtain the October 26, 2020 MRI report.  This is the 2nd MRI of the lumbar spine.  It has now been scanned into the media file.  The 1st MRI report done in 2017 is also in the media file.  Results report that there are mild degenerative changes including multiple disc bulges but no herniation or nerve root compression, mild stenosis.   There is no significant change noted in the 2nd report when compared with the 1st MRI.     Mr. Randhawa continues to be treated with prescription NSAIDS, muscle relaxers as  well as topical Voltaren gel for chronic LBP resulting from 2017 work related injury.  Since Mr. Randhawa continues to have chronic low back pain with occasional flares and no surgical intervention is required, I think it may be beneficial if he went through the [facility] in physical therapy. This may help prevent future flares as well as help him more effectively manage his chronic LBP with the hope of reducing long term NSAID use.   He is receptive to this suggestion.  He has not had any physical therapy since 2017.  I have refilled his tizanidine today which he takes at bedtime occasionally for spasm.  He has enough naproxen and Voltaren gel at home to use as needed.            Medications Ordered This Encounter   Medications    tiZANidine (ZANAFLEX) 4 MG tablet     Sig: TAKE 1 TABLET BY MOUTH EVERY NIGHT AS NEEDED FOR PAIN OR SPASM     Dispense:  20 tablet     Refill:  2     Patient Instructions: Attention not to aggravate affected area, Daily home exercises/warm soaks, PT to be scheduled once authorized   Restrictions: Regular Duty  Follow up in about 2 months (around 4/28/2025).

## 2025-04-05 ENCOUNTER — CLINICAL SUPPORT (OUTPATIENT)
Dept: OTHER | Facility: CLINIC | Age: 55
End: 2025-04-05

## 2025-04-05 ENCOUNTER — LAB REQUISITION (OUTPATIENT)
Dept: LAB | Facility: HOSPITAL | Age: 55
End: 2025-04-05
Payer: COMMERCIAL

## 2025-04-05 DIAGNOSIS — Z00.8 ENCOUNTER FOR OTHER GENERAL EXAMINATION: ICD-10-CM

## 2025-04-05 LAB — PSA SERPL-MCNC: 0.28 NG/ML

## 2025-04-05 PROCEDURE — 84153 ASSAY OF PSA TOTAL: CPT | Performed by: INTERNAL MEDICINE

## 2025-04-08 VITALS
SYSTOLIC BLOOD PRESSURE: 126 MMHG | BODY MASS INDEX: 22.5 KG/M2 | HEIGHT: 63 IN | WEIGHT: 127 LBS | DIASTOLIC BLOOD PRESSURE: 82 MMHG

## 2025-04-08 LAB
HDLC SERPL-MCNC: 43 MG/DL
POC CHOLESTEROL, TOTAL: 163 MG/DL
POC GLUCOSE, FASTING: 94 MG/DL (ref 60–110)
TRIGL SERPL-MCNC: 44 MG/DL

## 2025-04-28 ENCOUNTER — OFFICE VISIT (OUTPATIENT)
Dept: URGENT CARE | Facility: CLINIC | Age: 55
End: 2025-04-28
Payer: COMMERCIAL

## 2025-04-28 VITALS
SYSTOLIC BLOOD PRESSURE: 136 MMHG | DIASTOLIC BLOOD PRESSURE: 82 MMHG | BODY MASS INDEX: 22.5 KG/M2 | RESPIRATION RATE: 14 BRPM | WEIGHT: 127 LBS | OXYGEN SATURATION: 98 % | HEIGHT: 63 IN | HEART RATE: 82 BPM

## 2025-04-28 DIAGNOSIS — S39.012D STRAIN OF LUMBAR REGION, SUBSEQUENT ENCOUNTER: ICD-10-CM

## 2025-04-28 DIAGNOSIS — Z02.6 ENCOUNTER RELATED TO WORKER'S COMPENSATION CLAIM: Primary | ICD-10-CM

## 2025-04-28 DIAGNOSIS — M54.50 CHRONIC MIDLINE LOW BACK PAIN WITHOUT SCIATICA: ICD-10-CM

## 2025-04-28 DIAGNOSIS — G89.29 ENCOUNTER FOR CHRONIC PAIN MANAGEMENT: ICD-10-CM

## 2025-04-28 DIAGNOSIS — G89.29 CHRONIC MIDLINE LOW BACK PAIN WITHOUT SCIATICA: ICD-10-CM

## 2025-04-28 PROCEDURE — 99214 OFFICE O/P EST MOD 30 MIN: CPT | Mod: S$GLB,,, | Performed by: EMERGENCY MEDICINE

## 2025-04-28 RX ORDER — DICLOFENAC SODIUM 10 MG/G
2 GEL TOPICAL 3 TIMES DAILY
Qty: 150 G | Refills: 1 | Status: SHIPPED | OUTPATIENT
Start: 2025-04-28 | End: 2025-05-18

## 2025-04-28 RX ORDER — NAPROXEN 500 MG/1
500 TABLET ORAL 2 TIMES DAILY WITH MEALS
Qty: 60 TABLET | Refills: 1 | Status: SHIPPED | OUTPATIENT
Start: 2025-04-28 | End: 2025-05-28

## 2025-04-28 RX ORDER — TIZANIDINE 4 MG/1
4 TABLET ORAL EVERY 8 HOURS
Qty: 60 TABLET | Refills: 1 | Status: SHIPPED | OUTPATIENT
Start: 2025-04-28 | End: 2025-05-18

## 2025-04-28 RX ORDER — TIZANIDINE 4 MG/1
4 TABLET ORAL EVERY 8 HOURS
Qty: 60 TABLET | Refills: 1 | Status: SHIPPED | OUTPATIENT
Start: 2025-04-28 | End: 2025-04-28

## 2025-04-28 NOTE — PROGRESS NOTES
Subjective:      Patient ID: Mehul Eisenberg is a 54 y.o. male.    Chief Complaint: Back Pain    Patient's place of employment - [employer]  Patient's job title - /  Date of Injury - 08/25/2017  Body part injured - back  Current work status per last visit - regular duty   Improved, same, or worse - same  Pain Scale right now (1-10) -  1    Patient reports no significant change and minimal pain however occasionally does have a pulling type aching low back pain with lifting.  Has had full workup and treatment regimen and we are treating him for chronic back pain with medication, lifting techniques and home exercise program and last visit was referred to healthy back.  He states that he got a voicemail however needs to get back in touch with them to schedule appointment.  Exam is consistent with priors and plan the same as well.  He is and has been able to work regular duty without problems and will continue to do so.  I did refill his medication and informed him to call back healthy back and the OTC to set up getting started with the program.  Return to clinic 2 months.    Back Pain  Pertinent negatives include no bladder incontinence, bowel incontinence or numbness.     ros  Gastrointestinal:  Negative for bowel incontinence.   Genitourinary:  Negative for bladder incontinence.   Musculoskeletal:  Positive for pain, back pain and pain with walking. Negative for muscle cramps and muscle ache.   Skin:  Negative for erythema.   Neurological:  Negative for numbness and tingling.     Objective:     Physical Exam  Vitals and nursing note reviewed.   Constitutional:       General: He is not in acute distress.     Appearance: Normal appearance.   HENT:      Right Ear: External ear normal.      Left Ear: External ear normal.   Eyes:      Conjunctiva/sclera: Conjunctivae normal.   Cardiovascular:      Pulses: Normal pulses.   Pulmonary:      Effort: Pulmonary effort is normal.   Abdominal:      General: Abdomen  is flat.   Musculoskeletal:         General: No swelling or tenderness.      Lumbar back: No swelling, deformity, spasms or tenderness. Normal range of motion. Negative right straight leg raise test and negative left straight leg raise test.        Back:       Comments: Today has mild low back pain that is nonradiating.  Occasionally has spasm to the left buttock.  Denies pain, numbness or tingling to lower extremities.  Able to forward flex to 90°, slowly.  More pain with extension 10°.  No pain with left/right lateral bending/rotation.  5/5 strength lower extremities.  Heel and toe walks well.  Negative bilateral straight leg raises.   Skin:     Findings: No bruising or erythema.   Neurological:      General: No focal deficit present.      Mental Status: He is alert and oriented to person, place, and time.   Psychiatric:         Mood and Affect: Mood normal.         Behavior: Behavior normal.         Thought Content: Thought content normal.         Judgment: Judgment normal.      Assessment:      1. Encounter related to worker's compensation claim    2. Chronic midline low back pain without sciatica    3. Strain of lumbar region, subsequent encounter    4. Encounter for chronic pain management      Plan:     Patient reports no significant change and minimal pain however occasionally does have a pulling type aching low back pain with lifting. Has had full workup and treatment regimen and we are treating him for chronic back pain with medication, lifting techniques and home exercise program and last visit was referred to healthy back. He states that he got a voicemail however needs to get back in touch with them to schedule appointment. Exam is consistent with priors and plan the same as well. He is and has been able to work regular duty without problems and will continue to do so. I did refill his medication and informed him to call back healthy back and the OTC to set up getting started with the program. Return to  clinic 2 months.     Medications Ordered This Encounter   Medications    diclofenac sodium (VOLTAREN ARTHRITIS PAIN) 1 % Gel     Sig: Apply 2 g topically 3 (three) times daily. for 20 days     Dispense:  150 g     Refill:  1    naproxen (NAPROSYN) 500 MG tablet     Sig: Take 1 tablet (500 mg total) by mouth 2 (two) times daily with meals.     Dispense:  60 tablet     Refill:  1    tiZANidine (ZANAFLEX) 4 MG tablet     Sig: Take 1 tablet (4 mg total) by mouth every 8 (eight) hours. for 20 days     Dispense:  60 tablet     Refill:  1     Patient Instructions: Attention not to aggravate affected area, Daily home exercises/warm soaks (CALL BACK THE NUMBER ON YOUR VOICEMAIL FOR HEALTHY BACK THROUGH PROGRAM THROUGH [facility])   Restrictions: Regular Duty  Follow up in about 2 months (around 6/28/2025).

## 2025-04-28 NOTE — LETTER
[facility]  [address] [facility] [address]  Phone: [phone]  Fax: [phone]  [facility]: [phone]    Pt Name: Mehul Eisenberg  Injury Date: 08/25/2017   Employee ID:  Date of First Treatment: 04/28/2025   Company: [employer]      Appointment Time:  11:00 AM Arrived: 11:01 AM   Provider: Zain Rose MD Time Out:11:45 AM     Office Treatment:   1. Encounter related to worker's compensation claim    2. Chronic midline low back pain without sciatica    3. Strain of lumbar region, subsequent encounter    4. Encounter for chronic pain management      Medications Ordered This Encounter   Medications    diclofenac sodium (VOLTAREN ARTHRITIS PAIN) 1 % Gel    naproxen (NAPROSYN) 500 MG tablet    tiZANidine (ZANAFLEX) 4 MG tablet      Patient Instructions: Attention not to aggravate affected area, Daily home exercises/warm soaks (CALL BACK THE NUMBER ON YOUR VOICEMAIL FOR HEALTHY BACK THROUGH PROGRAM THROUGH [facility])      Restrictions: Regular Duty     Return Appointment:   6/27/2025 at 10:00 AM     sh

## 2025-05-30 ENCOUNTER — CLINICAL SUPPORT (OUTPATIENT)
Dept: REHABILITATION | Facility: HOSPITAL | Age: 55
End: 2025-05-30
Payer: COMMERCIAL

## 2025-05-30 DIAGNOSIS — M25.69 DECREASED RANGE OF MOTION OF TRUNK AND BACK: Primary | ICD-10-CM

## 2025-05-30 DIAGNOSIS — R29.898 DECREASED STRENGTH OF TRUNK AND BACK: ICD-10-CM

## 2025-05-30 PROCEDURE — 97530 THERAPEUTIC ACTIVITIES: CPT | Performed by: PHYSICAL MEDICINE & REHABILITATION

## 2025-05-30 PROCEDURE — 97162 PT EVAL MOD COMPLEX 30 MIN: CPT | Performed by: PHYSICAL MEDICINE & REHABILITATION

## 2025-05-30 PROCEDURE — 97110 THERAPEUTIC EXERCISES: CPT | Performed by: PHYSICAL MEDICINE & REHABILITATION

## 2025-05-30 NOTE — PATIENT INSTRUCTIONS
Do this exercise on your hands and knees. Keep your knees under your hips and your hands under your shoulders.

## 2025-05-30 NOTE — PROGRESS NOTES
Outpatient Rehab    Physical Therapy Evaluation    Patient Name: Mehul Eisenberg  MRN: [id number]  YOB: 1970  Encounter Date: 5/30/2025    Therapy Diagnosis:   Encounter Diagnoses   Name Primary?    Decreased range of motion of trunk and back Yes    Decreased strength of trunk and back      Physician: Rachael Chiu NP    Physician Orders: Eval and Treat  Medical Diagnosis: Chronic midline low back pain without sciatica    Visit # / Visits Authorized:  1 / 12  WORKER'S COMP (HE IS AT WORK)  Insurance Authorization Period: 2/28/2025 to 2/28/2026  Date of Evaluation: 5/30/2025  Plan of Care Certification: 5/30/2025 to 8/30/25     Time In:   12:30  Time Out:  1:30  Total Time (in minutes):   60  Total Billable Time (in minutes):  60    Intake Outcome Measure for FOTO Survey    Therapist reviewed FOTO scores for Mehul Eisenberg on 5/30/2025.   FOTO report - see Media section or FOTO account episode details.     Intake Score: 59%    Precautions: standard       Subjective   History of Present Illness  Mehul is a 54 y.o. male who reports to physical therapy with a chief concern of Back pain.     The patient reports a medical diagnosis of Diagnosis  M54.50,G89.29 (ICD-10-CM) - Chronic midline low back pain without sciatica.    Diagnostic tests related to this condition: X-ray.   X-Ray Details: 2017 Minimal degenerative changes.         Pain     Patient reports a current pain level of 1/10. Pain at best is reported as 1/10. Pain at worst is reported as 6/10.             Back pain started 2017.  He was picking up belts at work and he felt his back, he had trouble standing up.  He told the lead and then clinic at work where he was seen.  He was seen by [facility], had MRI.  He was out of work for 1 month.  He had therapy.  He returned to light duty 2 months, then released to regular duty.  He has been on regular duty since but continues to have back pain.  He was sent here for strengthening and chronic  pain.  Treatments have consisted of therapy.     Symptoms:  Middle low back pain,constant. 6/10 at worst.  1/10 at best.  2/10 now.  Sometimes his left leg has felt fatigued, but not recently (not for 2 months).  No numbness, no tingling, no other leg symptoms.  No falls, no fevers, no weight loss, no bowel bladder issues.  Neg cough sneeze.  He sleeps ok    Better: with exercise, naprosyn, soaking  Worse:  bending, picking up heavy stuff, sitting on floor to assemble things (more than 1/2 hour).  Fishing, due to sitting. Sitting long times    Social: , wife in good health.  2 children 13 and 10.    Leisure: use to fish (not currently, on land.  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 Sitting on floor to assemble items Does this 12/year Able to do   3. Reaching  Reaching at belt    4. Mopping 1/week at home  5/year at work Functioning through it       Per Physician's notes in epic:   Patient reports no significant change and minimal pain however occasionally does have a pulling type aching low back pain with lifting. Has had full workup and treatment regimen and we are treating him for chronic back pain with medication, lifting techniques and home exercise program and last visit was referred to healthy back. He states that he got a voicemail however needs to get back in touch with them to schedule appointment. Exam is consistent with priors and plan the same as well. He is and has been able to work regular duty  without problems and will continue to do so. I did refill his medication and informed him to call back healthy back and the OTC to set up getting started with the program. Return to clinic 2 months.       Past Medical History/Physical Systems Review:   Mehul Eisenberg  has no past medical history on file.    Mehul Eisenberg  has no past surgical history on file.    Mehul has a current medication list which includes the following prescription(s): diclofenac sodium, naproxen, and tizanidine.    Review of patient's allergies indicates:  No Known Allergies     Objective            Postural examination/scapula alignment: fair  Standing:fair  Correction of posture: better with lumbar roll      MOVEMENT LOSS - Lumbar   Norms ROM Loss Initial   Flexion Fingers touch toes, sacral angle >/= 70 deg, uniform spinal curvature, posterior weight shift  minimal loss, pain increased  end range, no worse   Extension ASIS surpasses toes, spine of scapulae surpasses heels, uniform spinal curve moderate loss, pain increased  end range, no worse   Side glide Right  moderate loss   Side glide Left  moderate loss   Rotation Right PT observes contralateral shoulder moderate loss   Rotation Left PT observes contralateral shoulder moderate loss     Lower Extremity Strength  Right LE  Left LE    Hip flexion: 5/5 Hip flexion: 5/5   Hip extension:  4+/5 Hip extension: 4+/5   Hip abduction: 4+/5 Hip abduction: 4+/5   Hip adduction:  4+/5 Hip adduction:  4+/5   Knee Flexion 4+/5 Knee Flexion 4+/5   Knee Extension 5/5 Knee Extension 5/5   Ankle dorsiflexion: 5/5 Ankle dorsiflexion: 5/5   Ankle plantarflexion: 5/5 Ankle plantarflexion: 5/5     Strength low back:  + prone stability test, poor multifidus firing, poor core firing    GAIT:  Assistive Device used: none  Level of Assistance: independent  Patient displays the following gait deviations: no gait deviations observed.       Special Tests:   Test Name  Test Result   Prone Instability Test  (+)poor multifidus   SI Joint Provocation Test (--)  distraction  thigh thrust  Compression  sacral thrust    Straight Leg Raise (--)   Neural Tension Test (--)   Crossed Straight Leg Raise (--)   Walking on toes Able   Walking on heels  Able       NEUROLOGICAL SCREENING:     Sensory deficits: intact    Reflexes:    Left Right   Patella Tendon 2+ 2+   Achilles Tendon 2+ 2+   Babinski  (--) (--)   Clonus (--) (--)     REPEATED TEST MOVEMENTS:    Baseline symptoms:mild back pain  Flexion in standing no effect  Repeated flexion in standing increased no worse  Extension in standing increased no worse  Repeated extension in standing increased end range, no worse        Lumbar Isometric Testing on Med X equipment: Testing administered by PT    Test Initial Midpoint Final   Date       ROM   deg     Max Peak Torque        Min Peak Torque        Flex/Ext Ratio        % below normative data       % gain from initial test Not available visit 1           Treatment:  Therapeutic Exercise  TE 1: Ext in standing during day at work 10 reps  TE 2: ext in lie at home 2/day 10 reps  TE 3: cat/camel 10 reps  Therapeutic Activity  TA 1: - Patient was given an [facility] Visit 1 handout which discusses the following:  - what to expect in therapy  - an overview of the program, including health coaching and wellness  - importance of spinal hygiene, proper posture, lifting mechanics, sleep quality, and nutrition/hydration   - Brijesh roll trialed, recommended, and purchase information was provided.  - Patient received a handout regarding anticipated muscular soreness following the isometric test and strategies for management were reviewed with patient including stretching, using ice and scheduled rest.   - Patient received verbal education on the following:   - Healthy Back program   - purpose of the isometric test  - safe progression of lumbar strengthening, wellness approach, and systemic strengthening.   - safe usage of MedX  machine and testing protocols.      Patient Education and Home Exercises     Home exercises include:  Ext in standing  Ext in lie  Cat/camel  Progress hip stretching and core work  Cardio program (V5): -  Lifting education (V11): -  Posture/Lumbar roll: recommended  Fridge Magnet Discharge handout (date given): -  Equipment at home/gym membership: nil  Other education provided:    Education provided:   - PT role and POC  - HEP  -healthy back protocol        Time Entry(in minutes):  PT Evaluation (Moderate) Time Entry: 45  Therapeutic Activity Time Entry: 15  Therapeutic Exercise Time Entry: 10    Assessment & Plan   Assessment  Mehul presents with a condition of Moderate complexity.   Presentation of Symptoms: Changing  Will Comorbidities Impact Care: Yes       Functional Limitations: Activity tolerance, Carrying objects, Completing self-care activities, Decreased ambulation distance/endurance, Participating in leisure activities, Disrupted sleep pattern, Functional mobility, Performing household chores, Pain with ADLs/IADLs, Range of motion, Sitting tolerance, Squatting, Standing tolerance  Impairments: Activity intolerance, Impaired physical strength, Pain with functional activity  Personal Factors Affecting Prognosis: Physical limitations    Patient Goal for Therapy (PT): See Patient Specific Functional Scale for 3 goals in FOTO  Prognosis: Good  Assessment Details: Pt presents with reported constant back pain of several years duration, able to work but notes symptoms aggravated at work and he is not doing fishing and other leisure activities.  Back pain   that is reproduced with movement, no worse with repeated movements, worse with repeated lifting per patient, poor ergonomics and reduced ROM back and strength noted.     Upon physical assessment, pt demonstrates issues with posture poor,  Mobility issues reduced back and thoracic spine,  Strength issues core and multifidus and LE.    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 Pt would benefit from comprehensive UE and LE and trunk mobility training, stability training,  improved cardiovascular and muscular endurance, neuromuscular re-education for posture, coordination, and muscular recruitment and education on positional offloading techniques to decrease the intensity and frequency of flare-ups.    Plan  From a physical therapy perspective, the patient would benefit from: Skilled Rehab Services    Planned therapy interventions include: Therapeutic exercise, Therapeutic activities, Neuromuscular re-education, Manual therapy, ADLs/IADLs, Aquatic therapy, and Sensory integration.            Visit Frequency: 2 times Per Week for 10 Weeks.       This plan was discussed with Patient.   Discussion participants: Agreed Upon Plan of Care  Plan details: Healthy Back protocol 2/week for isolated medx strengthening, whole body strengthening, therapy, manual skills and modalities as needed          The patient's spiritual, cultural, and educational needs were considered, and the patient is agreeable to the plan of care and goals.     Education  Education was done with Patient. The patient's learning style includes Demonstration, Listening, Pictures/video, and Reading. The patient Demonstrates understanding and Verbalizes understanding.                 Goals:   Active       Long term goal       Pt  will report a reduction in worst pain score by 1-2 points for improved tolerance for home and leisure activities with 10% increase in FOTO         Start:  05/30/25    Expected End:  08/30/25            Education completed with patient in terms of spine care, pain neuroscience, self care, and discharge program.  Patient understands information given and feels ready to manage self care        Start:  05/30/25    Expected End:  08/30/25               patient  goals       reduced fear about activities.  Return to fishing and leisure. Feel confident in his ability to manage his back pain       Start:  05/30/25    Expected End:  08/30/25               short term       increased MedX ROM of 3 degrees from initial ROM value, and strength gains in isometric testing by 20 % with improved ability to perform functional motion while standing and sitting for activities of daily living, and carrying and lifting.           Start:  05/30/25    Expected End:  07/30/25            Pt compliant with stretching daily and able to independently manage pain throughout a typical day.  Compliant with postural instructions to aid in ability to sit and travel in car, and perform activities of daily living.        Start:  05/30/25    Expected End:  07/30/25                Viv Bahena, PT

## 2025-06-03 ENCOUNTER — CLINICAL SUPPORT (OUTPATIENT)
Dept: REHABILITATION | Facility: HOSPITAL | Age: 55
End: 2025-06-03
Payer: COMMERCIAL

## 2025-06-03 DIAGNOSIS — R29.898 DECREASED STRENGTH OF TRUNK AND BACK: ICD-10-CM

## 2025-06-03 DIAGNOSIS — M25.69 DECREASED RANGE OF MOTION OF TRUNK AND BACK: Primary | ICD-10-CM

## 2025-06-03 PROCEDURE — 97110 THERAPEUTIC EXERCISES: CPT

## 2025-06-03 PROCEDURE — 97750 PHYSICAL PERFORMANCE TEST: CPT

## 2025-06-05 ENCOUNTER — CLINICAL SUPPORT (OUTPATIENT)
Dept: REHABILITATION | Facility: HOSPITAL | Age: 55
End: 2025-06-05
Payer: COMMERCIAL

## 2025-06-05 DIAGNOSIS — R29.898 DECREASED STRENGTH OF TRUNK AND BACK: ICD-10-CM

## 2025-06-05 DIAGNOSIS — M25.69 DECREASED RANGE OF MOTION OF TRUNK AND BACK: Primary | ICD-10-CM

## 2025-06-05 PROCEDURE — 97112 NEUROMUSCULAR REEDUCATION: CPT

## 2025-06-05 PROCEDURE — 97110 THERAPEUTIC EXERCISES: CPT

## 2025-06-10 ENCOUNTER — CLINICAL SUPPORT (OUTPATIENT)
Dept: REHABILITATION | Facility: HOSPITAL | Age: 55
End: 2025-06-10
Payer: COMMERCIAL

## 2025-06-10 DIAGNOSIS — R29.898 DECREASED STRENGTH OF TRUNK AND BACK: ICD-10-CM

## 2025-06-10 DIAGNOSIS — M25.69 DECREASED RANGE OF MOTION OF TRUNK AND BACK: Primary | ICD-10-CM

## 2025-06-10 PROCEDURE — 97112 NEUROMUSCULAR REEDUCATION: CPT

## 2025-06-10 PROCEDURE — 97110 THERAPEUTIC EXERCISES: CPT

## 2025-06-10 NOTE — PROGRESS NOTES
Outpatient Rehab    Physical Therapy Visit    Patient Name: Mehul Eisenberg  MRN: [id number]  YOB: 1970  Encounter Date: 6/10/2025    Therapy Diagnosis:   Encounter Diagnoses   Name Primary?    Decreased range of motion of trunk and back Yes    Decreased strength of trunk and back        Physician: Rachael Chiu NP    Physician Orders: Eval and Treat  Medical Diagnosis: Chronic midline low back pain without sciatica    Visit # / Visits Authorized:  4 / 12  Insurance Authorization Period: 2/28/2025 to 2/28/2026  Date of Evaluation: 5/30/2025  Plan of Care Certification: 5/30/2025 to 8/29/2025      PT/PTA:     Number of PTA visits since last PT visit:   Time In:   8  Time Out:  9  Total Time (in minutes):  60  Total Billable Time (in minutes):  55    FOTO:  Intake Score:  %  Survey Score 2:  %  Survey Score 3:  %    Precautions: standard       Subjective   Pt reports pain is 0/10 upon arrival.  Reports  some soreness in his LB after his work day.  Pain reported as 0/10.       Work Information:   Occupation/job title: shipping personal in [employer]  Current work restrictions: full duty  Previous work status: light duty 2017  Current work status: full     Functional Job Specific Testing: per patient, identifying tasks that are challenging for him  Job Specific Task Job Demands Current Ability   1. 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 Sitting on floor to assemble items Does this 12/year Able to do   3. Reaching  Reaching at belt     4. Mopping 1/week at home  5/year at work Functioning through it      Objective          Isometric Testing on Med X equipment: Testing administered by PT    Test Initial Baseline Midpoint Final   Date 6/3/25     ROM 0-48 deg     Max Peak Torque 130      Min Peak Torque 74      Flex/Ext Ratio 1.7:1     % variance  normative data 49     % change from initial test N/A visit 1          Outcomes:  Intake Score: 59%  Visit 6 Score:   Visit 10 Score:   Discharge Score:  Goal Score: 66%   Treatment:     Medical MedX Treatment as follows:  Time: 10    Mehul received neuromuscular education  to isolate and engage spinal stabilization musculature correctly for motor control and coordination to aid in function and posture for 10 minutes on the Medical Medx Machine.  Patient performed MedX dynamic exercise with emphasis on spinal muscular control using pacer throughout  active range of motion. Therapist assisted patient in achieving optimal exertion for neural reeducation and endurance training by using the  Dominique Exertion Rating scale, by instructing the patient to aim for mid range of exertion, performing 15-20 repetitions, slowly, correctly,and safely.          6/10/2025     8:37 AM   [facility]   Visit Number 4   VAS Pain Rating 0   Time 5   Extension in Lying 20   Extension in Standing 10   Lumbar Weight 77 lbs   Repetitions 20   Rating of Perceived Exertion 3   Ice - Z Lie (in min.) 5       Patient participated in therapeutic exercises to develop strength, endurance, ROM, flexibility, posture, and core stabilization for 50 minutes including:     Ext in standing    ext in lie 10x/ 10x c/ exhale   cat/camel 10 reps  Bridges x 20xGTB  BKFO c/ GTB 20x  LTR 10  SOC 10x  Open books 10  Bird dogs 10x  Peripheral muscle strengthening which included one set of 15-20 repetitions at a slow and controlled 10-13 second per rep pace focused on strengthening supporting musculature in order to improve body mechanics and functional mobility. Patient and therapist focused on proper form during treatment to ensure optimal strengthening of each targeted muscle group.  Machines utilized included:  torso rot/ hip abd/hip add/leg press        Pt given cold pack for 5 minutes to LB in supine.    Patient Education and Home Exercises      Home exercises include:  Ext in standing  Ext in lie  Cat/camel  Progress  hip stretching and core work  Cardio program (V5): -  Lifting education (V11): -  Posture/Lumbar roll: recommended  Fridge Magnet Discharge handout (date given): -  Equipment at home/gym membership: nil  Other education provided:  Time Entry(in minutes):  Neuromuscular Re-Education Time Entry: 10  Therapeutic Exercise Time Entry: 50    Assessment & Plan   Assessment: Pt arrives without pain today.  Added reps to bridges/BKFO's and added bird dogs today.  No difficulty performing progression.  Next visit add Paloff press.  Med X weight increased to 77ft/lbs.  Pt performed 1 min warm up prior to exercise.  Pt able to complete 20 reps at 3/10 exertion level.  COntinue to increase Med X weight and ROM as tolerated       The patient will continue to benefit from skilled outpatient physical therapy in order to address the deficits listed in the problem list on the initial evaluation, provide patient and family education, and maximize the patients level of independence in the home and community environments.     The patient's spiritual, cultural, and educational needs were considered, and the patient is agreeable to the plan of care and goals.             Plan:    Plan details: [facility] protocol 2/week for isolated medx strengthening, whole body strengthening, therapy, manual skills and modalities as needed      Active       Long term goal       Pt  will report a reduction in worst pain score by 1-2 points for improved tolerance for home and leisure activities with 10% increase in FOTO         Start:  05/30/25    Expected End:  08/30/25            Education completed with patient in terms of spine care, pain neuroscience, self care, and discharge program.  Patient understands information given and feels ready to manage self care        Start:  05/30/25    Expected End:  08/30/25               patient goals       reduced fear about activities.  Return to fishing and leisure. Feel confident in his ability to manage his  back pain       Start:  05/30/25    Expected End:  08/30/25               short term       increased MedX ROM of 3 degrees from initial ROM value, and strength gains in isometric testing by 20 % with improved ability to perform functional motion while standing and sitting for activities of daily living, and carrying and lifting.           Start:  05/30/25    Expected End:  07/30/25            Pt compliant with stretching daily and able to independently manage pain throughout a typical day.  Compliant with postural instructions to aid in ability to sit and travel in car, and perform activities of daily living.        Start:  05/30/25    Expected End:  07/30/25                Goals:   Active         Long term goal         Pt  will report a reduction in worst pain score by 1-2 points for improved tolerance for home and leisure activities with 10% increase in FOTO           Start:  05/30/25    Expected End:  08/30/25              Education completed with patient in terms of spine care, pain neuroscience, self care, and discharge program.  Patient understands information given and feels ready to manage self care          Start:  05/30/25    Expected End:  08/30/25                 patient goals         reduced fear about activities.  Return to fishing and leisure. Feel confident in his ability to manage his back pain         Start:  05/30/25    Expected End:  08/30/25                 short term         increased MedX ROM of 3 degrees from initial ROM value, and strength gains in isometric testing by 20 % with improved ability to perform functional motion while standing and sitting for activities of daily living, and carrying and lifting.             Start:  05/30/25    Expected End:  07/30/25              Pt compliant with stretching daily and able to independently manage pain throughout a typical day.  Compliant with postural instructions to aid in ability to sit and travel in car, and perform activities of daily living.           Start:  05/30/25    Expected End:  07/30/25               Mitali Springer PT

## 2025-06-11 NOTE — PROGRESS NOTES
Outpatient Rehab    Physical Therapy Visit    Patient Name: Mehul Eisenberg  MRN: [id number]  YOB: 1970  Encounter Date: 6/12/2025    Therapy Diagnosis:   Encounter Diagnoses   Name Primary?    Decreased range of motion of trunk and back Yes    Decreased strength of trunk and back        Physician: Rachael Chiu NP    Physician Orders: Eval and Treat  Medical Diagnosis: Chronic midline low back pain without sciatica    Visit # / Visits Authorized:  5/12  Insurance Authorization Period: 2/28/2025 to 2/28/2026  Date of Evaluation: 5/30/2025  Plan of Care Certification: 5/30/2025 to 8/29/2025      PT/PTA: PTA   Number of PTA visits since last PT visit:1  Time In: 0800   Time Out: 0900  Total Time (in minutes): 60  Total Billable Time (in minutes): 55    FOTO:  Intake Score:  %  Survey Score 2:  %  Survey Score 3:  %    Precautions: standard       Subjective   Patient states that he is feeling pretty good today and is without c/o pain..  Pain reported as 0/10.       Work Information:   Occupation/job title: shipping personal in [employer]  Current work restrictions: full duty  Previous work status: light duty 2017  Current work status: full     Functional Job Specific Testing: per patient, identifying tasks that are challenging for him  Job Specific Task Job Demands Current Ability   1. 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 Sitting on floor to assemble items Does this 12/year Able to do   3. Reaching  Reaching at belt     4. Mopping 1/week at home  5/year at work Functioning through it      Objective          Isometric Testing on Med X equipment: Testing administered by PT    Test Initial Baseline Midpoint Final   Date 6/3/25     ROM 0-48 deg     Max Peak Torque 130      Min Peak Torque 74      Flex/Ext Ratio 1.7:1     % variance  normative data 49     % change from initial test N/A visit 1          Outcomes:  Intake Score: 59%  Visit 6 Score:   Visit 10 Score:   Discharge Score:  Goal Score: 66%    Treatment:       Mehul received neuromuscular education  to isolate and engage spinal stabilization musculature correctly for motor control and coordination to aid in function and posture for 10 minutes on the Medical Medx Machine.  Patient performed MedX dynamic exercise with emphasis on spinal muscular control using pacer throughout  active range of motion. Therapist assisted patient in achieving optimal exertion for neural reeducation and endurance training by using the  Dominique Exertion Rating scale, by instructing the patient to aim for mid range of exertion, performing 15-20 repetitions, slowly, correctly,and safely.          6/12/2025     8:11 AM   [facility] - [facility]   Visit Number 5   VAS Pain Rating 0   Time 5   Extension in Lying 20   Extension in Standing 10   Lumbar Weight 82 lbs   Repetitions 15   Rating of Perceived Exertion 3      Patient participated in therapeutic exercises to develop strength, endurance, ROM, flexibility, posture, and core stabilization for 45 minutes including:     Ext in standing x 10   ext in lie 10x/ 10x c/ exhale   cat/camel 10 reps  Bird dogs 10x  LTR 10  Open books 10  Bridges x 20xGTB  BKFO c/ GTB 20x  SOC 10x--NP  +Paloff press 10# x 15    Peripheral muscle strengthening which included one set of 15-20 repetitions at a slow and controlled 10-13 second per rep pace focused on strengthening supporting musculature in order to improve body mechanics and functional mobility. Patient and therapist focused on proper form during treatment to ensure optimal strengthening of each targeted muscle group.  Machines utilized included:  torso rot/ hip abd/hip add/leg press        Pt given cold pack for 5 minutes to LB in supine.    Patient Education and Home Exercises      Home exercises include:  Ext in standing  Ext in lie  Cat/camel  Progress hip stretching and core  work  Cardio program (V5): - 6/12/25 Benefits of cardio handout reviewed and provided  Lifting education (V11): -  Posture/Lumbar roll: recommended  Fridge Magnet Discharge handout (date given): -  Equipment at home/gym membership: nil  Other education provided:  Time Entry(in minutes):  Neuromuscular Re-Education Time Entry: 10  Therapeutic Exercise Time Entry: 45    Assessment & Plan   Assessment: Mehul returns reporting no pain currently..Treatment continued with flexibility, strengthening and neuromuscular reeducation exs. Progressions included the addition of Paloff press for additional core strengthening.  He was able to perform ex's with min cues and without increased pain. Lumbar MedX resistance was increased to 82 ft/lbs and he completed 15 reps with a RPE = 3/10. He completed peripheral strengthening ex's without c/o (chest press deferred). Will continue per HB protocol and patient tolerance.  Evaluation/Treatment Tolerance: Patient tolerated treatment well    The patient will continue to benefit from skilled outpatient physical therapy in order to address the deficits listed in the problem list on the initial evaluation, provide patient and family education, and maximize the patients level of independence in the home and community environments.     The patient's spiritual, cultural, and educational needs were considered, and the patient is agreeable to the plan of care and goals.             Plan: Continue PT towards established plan of care and PT goals  Plan details: [facility] protocol 2/week for isolated medx strengthening, whole body strengthening, therapy, manual skills and modalities as needed      Active       Long term goal       Pt  will report a reduction in worst pain score by 1-2 points for improved tolerance for home and leisure activities with 10% increase in FOTO         Start:  05/30/25    Expected End:  08/30/25            Education completed with patient in terms of spine care, pain  neuroscience, self care, and discharge program.  Patient understands information given and feels ready to manage self care        Start:  05/30/25    Expected End:  08/30/25               patient goals       reduced fear about activities.  Return to fishing and leisure. Feel confident in his ability to manage his back pain       Start:  05/30/25    Expected End:  08/30/25               short term       increased MedX ROM of 3 degrees from initial ROM value, and strength gains in isometric testing by 20 % with improved ability to perform functional motion while standing and sitting for activities of daily living, and carrying and lifting.           Start:  05/30/25    Expected End:  07/30/25            Pt compliant with stretching daily and able to independently manage pain throughout a typical day.  Compliant with postural instructions to aid in ability to sit and travel in car, and perform activities of daily living.        Start:  05/30/25    Expected End:  07/30/25                Goals:   Active         Long term goal         Pt  will report a reduction in worst pain score by 1-2 points for improved tolerance for home and leisure activities with 10% increase in FOTO           Start:  05/30/25    Expected End:  08/30/25              Education completed with patient in terms of spine care, pain neuroscience, self care, and discharge program.  Patient understands information given and feels ready to manage self care          Start:  05/30/25    Expected End:  08/30/25                 patient goals         reduced fear about activities.  Return to fishing and leisure. Feel confident in his ability to manage his back pain         Start:  05/30/25    Expected End:  08/30/25                 short term         increased MedX ROM of 3 degrees from initial ROM value, and strength gains in isometric testing by 20 % with improved ability to perform functional motion while standing and sitting for activities of daily living,  and carrying and lifting.             Start:  05/30/25    Expected End:  07/30/25              Pt compliant with stretching daily and able to independently manage pain throughout a typical day.  Compliant with postural instructions to aid in ability to sit and travel in car, and perform activities of daily living.          Start:  05/30/25    Expected End:  07/30/25               Charles Moise PTA

## 2025-06-12 ENCOUNTER — CLINICAL SUPPORT (OUTPATIENT)
Dept: REHABILITATION | Facility: HOSPITAL | Age: 55
End: 2025-06-12
Payer: COMMERCIAL

## 2025-06-12 DIAGNOSIS — M25.69 DECREASED RANGE OF MOTION OF TRUNK AND BACK: Primary | ICD-10-CM

## 2025-06-12 DIAGNOSIS — R29.898 DECREASED STRENGTH OF TRUNK AND BACK: ICD-10-CM

## 2025-06-12 PROCEDURE — 97112 NEUROMUSCULAR REEDUCATION: CPT | Mod: CQ

## 2025-06-12 PROCEDURE — 97110 THERAPEUTIC EXERCISES: CPT | Mod: CQ

## 2025-06-17 ENCOUNTER — CLINICAL SUPPORT (OUTPATIENT)
Dept: REHABILITATION | Facility: HOSPITAL | Age: 55
End: 2025-06-17
Payer: COMMERCIAL

## 2025-06-17 DIAGNOSIS — M25.69 DECREASED RANGE OF MOTION OF TRUNK AND BACK: ICD-10-CM

## 2025-06-17 DIAGNOSIS — R29.898 DECREASED STRENGTH OF TRUNK AND BACK: Primary | ICD-10-CM

## 2025-06-17 PROCEDURE — 97112 NEUROMUSCULAR REEDUCATION: CPT | Mod: CQ

## 2025-06-17 PROCEDURE — 97110 THERAPEUTIC EXERCISES: CPT | Mod: CQ

## 2025-06-17 NOTE — PROGRESS NOTES
Outpatient Rehab    Physical Therapy Visit    Patient Name: Mehul Eisenberg  MRN: [id number]  YOB: 1970  Encounter Date: 6/17/2025    Therapy Diagnosis:   Encounter Diagnoses   Name Primary?    Decreased range of motion of trunk and back     Decreased strength of trunk and back Yes       Physician: Rachael Chiu NP    Physician Orders: Eval and Treat  Medical Diagnosis: Chronic midline low back pain without sciatica    Visit # / Visits Authorized:  6/12  Insurance Authorization Period: 2/28/2025 to 2/28/2026  Date of Evaluation: 5/30/2025  Plan of Care Certification: 5/30/2025 to 8/29/2025      PT/PTA: PTA   Number of PTA visits since last PT visit:2  Time In: 0900   Time Out: 1000  Total Time (in minutes): 60  Total Billable Time (in minutes): 55    FOTO:  Intake Score: 59%  Survey Score 2: 62%  Survey Score 3:  %    Precautions: standard       Subjective   Patient reports no pain currently..  Pain reported as 0/10.       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 Sitting on floor to assemble items Does this 12/year Able to do   3. Reaching  Reaching at belt     4. Mopping 1/week at home  5/year at work Functioning through it      Objective          Isometric Testing on Med X equipment: Testing administered by PT    Test Initial Baseline Midpoint Final   Date 6/3/25     ROM 0-48 deg     Max Peak Torque 130      Min Peak Torque 74      Flex/Ext Ratio 1.7:1     % variance  normative data 49     % change from initial test N/A visit 1         Outcomes:  Intake Score: 59%  Visit 6 Score:    Visit 10 Score:   Discharge Score:  Goal Score: 66%        Treatment:       Mehul received neuromuscular education  to isolate and engage spinal stabilization musculature correctly for motor control and coordination to aid in function and posture for 10 minutes on the Medical Medx Machine.  Patient performed MedX dynamic exercise with emphasis on spinal muscular control using pacer throughout  active range of motion. Therapist assisted patient in achieving optimal exertion for neural reeducation and endurance training by using the  Dominique Exertion Rating scale, by instructing the patient to aim for mid range of exertion, performing 15-20 repetitions, slowly, correctly,and safely.          6/17/2025     9:09 AM   [facility] - [facility]   Visit Number 6   VAS Pain Rating 0   Time 5   Extension in Lying 20   Extension in Standing 10   Lumbar Weight 82 lbs   Repetitions 18   Rating of Perceived Exertion 3      Patient participated in therapeutic exercises to develop strength, endurance, ROM, flexibility, posture, and core stabilization for 45 minutes including:     Ext in standing x 10   ext in lie 10x/ 10x c/ exhale   cat/camel 10 reps  Bird dogs 10x  LTR x 10  Open books x 10  Bridges x 20xBTB  BKFO c/ BTB  x 10  Paloff press 15# x 15  +Cable cross chops (Short bar) 20# x 10, lifts with rope 15# x 10    Peripheral muscle strengthening which included one set of 15-20 repetitions at a slow and controlled 10-13 second per rep pace focused on strengthening supporting musculature in order to improve body mechanics and functional mobility. Patient and therapist focused on proper form during treatment to ensure optimal strengthening of each targeted muscle group.  Machines utilized included:  torso rot/ hip abd/hip add/leg press, leg ext, leg curl, rows, tricep ext, bicep curls and chest press    Pt given cold pack for 5 minutes to LB in supine.    Patient Education and Home Exercises      Home exercises include:  Ext in  standing  Ext in lie  Cat/camel  Progress hip stretching and core work  Cardio program (V5): - 6/12/25 Benefits of cardio handout reviewed and provided  Lifting education (V11): -  Posture/Lumbar roll: recommended  Fridge Magnet Discharge handout (date given): -  Equipment at home/gym membership: nil  Other education provided:  Time Entry(in minutes):  Neuromuscular Re-Education Time Entry: 10  Therapeutic Exercise Time Entry: 45    Assessment & Plan   Assessment: Mehul returns reporting no pain currently..Treatment continued with flexibility, strengthening and neuromuscular reeducation exs. Progressions included the addition of Inspire cable cross chops and lifts, progressed resistance for Paloff press additional functional core strengthening.  He was able to perform ex's with min cues and without increased pain. Lumbar MedX resistance was maintained at 82 ft/lbs and he completed 18 reps with a RPE = 3/10. He completed peripheral strengthening ex's without c/o (chest press deferred). Will continue per HB protocol and patient tolerance. Updated FOTO scoring reflects some improvements.  Evaluation/Treatment Tolerance: Patient tolerated treatment well    The patient will continue to benefit from skilled outpatient physical therapy in order to address the deficits listed in the problem list on the initial evaluation, provide patient and family education, and maximize the patients level of independence in the home and community environments.     The patient's spiritual, cultural, and educational needs were considered, and the patient is agreeable to the plan of care and goals.           Plan: Continue PT towards established plan of care and PT goals  Plan details: [facility] protocol 2/week for isolated medx strengthening, whole body strengthening, therapy, manual skills and modalities as needed      Active       Long term goal       Pt  will report a reduction in worst pain score by 1-2 points for improved tolerance  for home and leisure activities with 10% increase in FOTO         Start:  05/30/25    Expected End:  08/30/25            Education completed with patient in terms of spine care, pain neuroscience, self care, and discharge program.  Patient understands information given and feels ready to manage self care        Start:  05/30/25    Expected End:  08/30/25               patient goals       reduced fear about activities.  Return to fishing and leisure. Feel confident in his ability to manage his back pain       Start:  05/30/25    Expected End:  08/30/25               short term       increased MedX ROM of 3 degrees from initial ROM value, and strength gains in isometric testing by 20 % with improved ability to perform functional motion while standing and sitting for activities of daily living, and carrying and lifting.           Start:  05/30/25    Expected End:  07/30/25            Pt compliant with stretching daily and able to independently manage pain throughout a typical day.  Compliant with postural instructions to aid in ability to sit and travel in car, and perform activities of daily living.        Start:  05/30/25    Expected End:  07/30/25                Goals:   Active         Long term goal         Pt  will report a reduction in worst pain score by 1-2 points for improved tolerance for home and leisure activities with 10% increase in FOTO           Start:  05/30/25    Expected End:  08/30/25              Education completed with patient in terms of spine care, pain neuroscience, self care, and discharge program.  Patient understands information given and feels ready to manage self care          Start:  05/30/25    Expected End:  08/30/25                 patient goals         reduced fear about activities.  Return to fishing and leisure. Feel confident in his ability to manage his back pain         Start:  05/30/25    Expected End:  08/30/25                 short term         increased MedX ROM of 3 degrees  from initial ROM value, and strength gains in isometric testing by 20 % with improved ability to perform functional motion while standing and sitting for activities of daily living, and carrying and lifting.             Start:  05/30/25    Expected End:  07/30/25              Pt compliant with stretching daily and able to independently manage pain throughout a typical day.  Compliant with postural instructions to aid in ability to sit and travel in car, and perform activities of daily living.          Start:  05/30/25    Expected End:  07/30/25               Charles Moise PTA

## 2025-06-24 ENCOUNTER — CLINICAL SUPPORT (OUTPATIENT)
Dept: REHABILITATION | Facility: HOSPITAL | Age: 55
End: 2025-06-24
Payer: COMMERCIAL

## 2025-06-24 DIAGNOSIS — R29.898 DECREASED STRENGTH OF TRUNK AND BACK: ICD-10-CM

## 2025-06-24 DIAGNOSIS — M25.69 DECREASED RANGE OF MOTION OF TRUNK AND BACK: Primary | ICD-10-CM

## 2025-06-24 PROCEDURE — 97110 THERAPEUTIC EXERCISES: CPT | Performed by: PHYSICAL MEDICINE & REHABILITATION

## 2025-06-24 PROCEDURE — 97112 NEUROMUSCULAR REEDUCATION: CPT | Performed by: PHYSICAL MEDICINE & REHABILITATION

## 2025-06-24 NOTE — PROGRESS NOTES
Outpatient Rehab    Physical Therapy Progress Note    Patient Name: Mehul Eisenberg  MRN: [id number]  YOB: 1970  Encounter Date: 6/24/2025    Therapy Diagnosis:   Encounter Diagnoses   Name Primary?    Decreased range of motion of trunk and back Yes    Decreased strength of trunk and back          Physician: Rachael Chiu NP    Physician Orders: Eval and Treat  Medical Diagnosis: Chronic midline low back pain without sciatica    Visit # / Visits Authorized: 7/12  Reassessment due 7/24/125  Insurance Authorization Period: 2/28/2025 to 2/28/2026  Date of Evaluation: 5/30/2025  Plan of Care Certification: 5/30/2025 to 8/29/2025      PT/PTA: PT   Number of PTA visits since last PT visit:0  Time In:   8  Time Out:  9  Total Time (in minutes):  60  Total Billable Time (in minutes):  60    FOTO:  Intake Score:59 %  Survey Score 2: 62 %  Survey Score 3:  %    Precautions: standard       Subjective   Patient reports no pain currently.  He travelled to [place] 10 hours and used lumbar roll and his back did ok.  He is doing his stretching..          Work Information:   Occupation/job title: shipping personal in [employer]  Current work restrictions: full duty  Previous work status: light duty 2017  Current work status: full     Functional Job Specific Testing: per patient, identifying tasks that are challenging for him  Job Specific Task Job Demands Current Ability   1. 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 Sitting on floor to assemble items Does this 12/year Able to do   3. Reaching  Reaching at belt     4. Mopping 1/week at home  5/year at work Functioning through it      Objective          Isometric Testing on Med X equipment: Testing administered by PT    Test Initial Baseline Midpoint Final   Date 6/3/25     ROM 0-48 deg     Max Peak Torque 130      Min Peak Torque 74      Flex/Ext Ratio 1.7:1     % variance   normative data 49     % change from initial test N/A visit 1         MOVEMENT LOSS - Lumbar    Norms ROM Loss Initial 6/24/25   Flexion Fingers touch toes, sacral angle >/= 70 deg, uniform spinal curvature, posterior weight shift  minimal loss, pain increased  end range, no worse Min loss, no complaints of pain   Extension ASIS surpasses toes, spine of scapulae surpasses heels, uniform spinal curve moderate loss, pain increased  end range, no worse Min loss, tolerating much improved, end range discomfort no worse   Side glide Right   moderate loss Min loss   Side glide Left   moderate loss Min loss   Rotation Right PT observes contralateral shoulder moderate loss Min loss   Rotation Left PT observes contralateral shoulder moderate loss Min loss      Outcomes:  Intake Score: 59%  Visit 6 Score: 62  Visit 10 Score:   Discharge Score:  Goal Score: 66%        Treatment:       Mehul received neuromuscular education  to isolate and engage spinal stabilization musculature correctly for motor control and coordination to aid in function and posture for 10 minutes on the Medical Medx Machine.  Patient performed MedX dynamic exercise with emphasis on spinal muscular control using pacer throughout  active range of motion. Therapist assisted patient in achieving optimal exertion for neural reeducation and endurance training by using the  Dominique Exertion Rating scale, by instructing the patient to aim for mid range of exertion, performing 15-20 repetitions, slowly, correctly,and safely.          6/24/2025     8:45 AM   [facility]   Visit Number 7   VAS Pain Rating 0   Time 5   Extension in Lying 20   Extension in Standing 10   Lumbar Weight 82 lbs   Repetitions 20   Rating of Perceived Exertion 3   Ice - Z Lie (in min.) 5         Patient participated in therapeutic exercises to develop strength, endurance, ROM, flexibility, posture, and core stabilization for 45 minutes including:     Ext in standing x 10   ext in lie 10x/  10x c/ exhale   cat/camel 10 reps  Bird dogs 10x with red theraband  LTR x 10  Open books x 10  Bridges x 20xBTB  BKFO c/ BTB  x 10  Paloff press 15# x 15  Cable cross chops (Short bar) 20# x 10, lifts with rope 15# x 10      Initiated lifting education:  Hip hinge with dowel 10 reps    Peripheral muscle strengthening which included one set of 15-20 repetitions at a slow and controlled 10-13 second per rep pace focused on strengthening supporting musculature in order to improve body mechanics and functional mobility. Patient and therapist focused on proper form during treatment to ensure optimal strengthening of each targeted muscle group.  Machines utilized included:  torso rot/ hip abd/hip add/leg press, leg ext, leg curl, rows, tricep ext, bicep curls and chest press    Pt given cold pack for 5 minutes to LB in supine.    Patient Education and Home Exercises      Home exercises include:  Ext in standing  Ext in lie  Cat/camel  Progress hip stretching and core work  Cardio program (V5): - 6/12/25 Benefits of cardio handout reviewed and provided  Lifting education (V11): -  Posture/Lumbar roll: recommended  Fridge Magnet Discharge handout (date given): -  Equipment at home/gym membership: nil  Other education provided:  Time Entry(in minutes):  Neuromuscular Re-Education Time Entry: 10  Therapeutic Activity Time Entry: 10  Therapeutic Exercise Time Entry: 45    Assessment & Plan   Assessment: Mehul returns reporting no pain currently.. He recently tolerated travel to [place] well, used lumbar roll and stretched.  He is compliant with care. Treatment continued with flexibility, strengthening and neuromuscular reeducation exs. Progressions included the addition of bands to bird dog and continued  Inspire cable cross chops and lifts, progressed lifting education and taught hip hinge for lifting at work. He was able to perform ex's with min cues and without increased pain. Lumbar MedX resistance was maintained at 82 ft/lbs  and he completed 20 reps with a RPE = 4/10. He completed peripheral strengthening ex's without c/o (chest press deferred). Will continue per HB protocol and patient tolerance.   Evaluation/Treatment Tolerance: Patient tolerated treatment well    The patient will continue to benefit from skilled outpatient physical therapy in order to address the deficits listed in the problem list on the initial evaluation, provide patient and family education, and maximize the patients level of independence in the home and community environments.     The patient's spiritual, cultural, and educational needs were considered, and the patient is agreeable to the plan of care and goals.           Plan: Continue PT towards established plan of care and PT goals  Plan details: [facility] protocol 2/week for isolated medx strengthening, whole body strengthening, therapy, manual skills and modalities as needed      Active       Long term goal       Pt  will report a reduction in worst pain score by 1-2 points for improved tolerance for home and leisure activities with 10% increase in FOTO   (Progressing)       Start:  05/30/25    Expected End:  08/30/25            Education completed with patient in terms of spine care, pain neuroscience, self care, and discharge program.  Patient understands information given and feels ready to manage self care  (Progressing)       Start:  05/30/25    Expected End:  08/30/25               patient goals       reduced fear about activities.  Return to fishing and leisure. Feel confident in his ability to manage his back pain (Progressing)       Start:  05/30/25    Expected End:  08/30/25               short term       increased MedX ROM of 3 degrees from initial ROM value, and strength gains in isometric testing by 20 % with improved ability to perform functional motion while standing and sitting for activities of daily living, and carrying and lifting.     (Progressing)       Start:  05/30/25    Expected  End:  07/30/25            Pt compliant with stretching daily and able to independently manage pain throughout a typical day.  Compliant with postural instructions to aid in ability to sit and travel in car, and perform activities of daily living.  (Progressing)       Start:  05/30/25    Expected End:  07/30/25                  Goals:   Active         Long term goal         Pt  will report a reduction in worst pain score by 1-2 points for improved tolerance for home and leisure activities with 10% increase in FOTO           Start:  05/30/25    Expected End:  08/30/25              Education completed with patient in terms of spine care, pain neuroscience, self care, and discharge program.  Patient understands information given and feels ready to manage self care          Start:  05/30/25    Expected End:  08/30/25                 patient goals         reduced fear about activities.  Return to fishing and leisure. Feel confident in his ability to manage his back pain         Start:  05/30/25    Expected End:  08/30/25                 short term         increased MedX ROM of 3 degrees from initial ROM value, and strength gains in isometric testing by 20 % with improved ability to perform functional motion while standing and sitting for activities of daily living, and carrying and lifting.             Start:  05/30/25    Expected End:  07/30/25              Pt compliant with stretching daily and able to independently manage pain throughout a typical day.  Compliant with postural instructions to aid in ability to sit and travel in car, and perform activities of daily living.          Start:  05/30/25    Expected End:  07/30/25               Viv Bahena, PT

## 2025-06-26 ENCOUNTER — CLINICAL SUPPORT (OUTPATIENT)
Dept: REHABILITATION | Facility: HOSPITAL | Age: 55
End: 2025-06-26
Payer: COMMERCIAL

## 2025-06-26 DIAGNOSIS — M25.69 DECREASED RANGE OF MOTION OF TRUNK AND BACK: Primary | ICD-10-CM

## 2025-06-26 DIAGNOSIS — R29.898 DECREASED STRENGTH OF TRUNK AND BACK: ICD-10-CM

## 2025-06-26 PROCEDURE — 97110 THERAPEUTIC EXERCISES: CPT | Mod: CQ

## 2025-06-26 PROCEDURE — 97112 NEUROMUSCULAR REEDUCATION: CPT | Mod: CQ

## 2025-06-26 NOTE — PROGRESS NOTES
Outpatient Rehab    Physical Therapy Progress Note    Patient Name: Mehul Eisenberg  MRN: [id number]  YOB: 1970  Encounter Date: 6/26/2025    Therapy Diagnosis:   Encounter Diagnoses   Name Primary?    Decreased range of motion of trunk and back Yes    Decreased strength of trunk and back          Physician: Rachael Chiu NP    Physician Orders: Eval and Treat  Medical Diagnosis: Chronic midline low back pain without sciatica    Visit # / Visits Authorized: 8/12  Reassessment due 7/24/125  Insurance Authorization Period: 2/28/2025 to 2/28/2026  Date of Evaluation: 5/30/2025  Plan of Care Certification: 5/30/2025 to 8/29/2025      PT/PTA: PTA   Number of PTA visits since last PT visit:1  Time In: 0800   Time Out: 0900  Total Time (in minutes): 60  Total Billable Time (in minutes): 55    FOTO:  Intake Score:59 %  Survey Score 2: 62 %  Survey Score 3:  %    Precautions: standard       Subjective   Patient states that he is feeling pretty good this morning. He reports  min stiffness without c/o pain..  Pain reported as 0/10.       Work Information:   Occupation/job title: shipping personal in [employer]  Current work restrictions: full duty  Previous work status: light duty 2017  Current work status: full     Functional Job Specific Testing: per patient, identifying tasks that are challenging for him  Job Specific Task Job Demands Current Ability   1. 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 Sitting on floor to assemble items Does this 12/year Able to do   3. Reaching  Reaching at belt     4. Mopping 1/week at home  5/year at work Functioning through it      Objective          Isometric Testing on Med X equipment: Testing administered by PT    Test Initial Baseline Midpoint Final   Date 6/3/25     ROM 0-48 deg     Max Peak Torque 130      Min Peak Torque 74      Flex/Ext Ratio 1.7:1     % variance  normative data  49     % change from initial test N/A visit 1         MOVEMENT LOSS - Lumbar    Norms ROM Loss Initial 6/24/25   Flexion Fingers touch toes, sacral angle >/= 70 deg, uniform spinal curvature, posterior weight shift  minimal loss, pain increased  end range, no worse Min loss, no complaints of pain   Extension ASIS surpasses toes, spine of scapulae surpasses heels, uniform spinal curve moderate loss, pain increased  end range, no worse Min loss, tolerating much improved, end range discomfort no worse   Side glide Right   moderate loss Min loss   Side glide Left   moderate loss Min loss   Rotation Right PT observes contralateral shoulder moderate loss Min loss   Rotation Left PT observes contralateral shoulder moderate loss Min loss      Outcomes:  Intake Score: 59%  Visit 6 Score: 62  Visit 10 Score:   Discharge Score:  Goal Score: 66%        Treatment:       Mehul received neuromuscular education  to isolate and engage spinal stabilization musculature correctly for motor control and coordination to aid in function and posture for 10 minutes on the Medical Medx Machine.  Patient performed MedX dynamic exercise with emphasis on spinal muscular control using pacer throughout  active range of motion. Therapist assisted patient in achieving optimal exertion for neural reeducation and endurance training by using the  Dominique Exertion Rating scale, by instructing the patient to aim for mid range of exertion, performing 15-20 repetitions, slowly, correctly,and safely.           6/26/2025     8:12 AM   HealthyBack Therapy - Short   Visit Number 8   VAS Pain Rating 0   Time 5   Extension in Lying 20   Extension in Standing 10   Lumbar Weight 87 lbs   Repetitions 15   Rating of Perceived Exertion 3       Patient participated in therapeutic exercises to develop strength, endurance, ROM, flexibility, posture, and core stabilization for 45 minutes including:     Ext in standing x 10   ext in lie 10x/ 10x c/ exhale   cat/camel 10  reps  Bird dogs 10x    LTR x 10  Open books x 10  Bridges x 20xBTB  BKFO c/ BTB  x 15  Paloff press 15# x 15  Cable cross chops (Short bar) 25# x 10, lifts with rope 15# x 10  +15# KB deadlift x 10    Peripheral muscle strengthening which included one set of 15-20 repetitions at a slow and controlled 10-13 second per rep pace focused on strengthening supporting musculature in order to improve body mechanics and functional mobility. Patient and therapist focused on proper form during treatment to ensure optimal strengthening of each targeted muscle group.  Machines utilized included:  torso rot/ hip abd/hip add/leg press, leg ext, leg curl, rows, tricep ext, bicep curls and chest press    Pt given cold pack for 5 minutes to LB in supine.    Patient Education and Home Exercises      Home exercises include:  Ext in standing  Ext in lie  Cat/camel  Progress hip stretching and core work  Cardio program (V5): - 6/12/25 Benefits of cardio handout reviewed and provided  Lifting education (V11): -  Posture/Lumbar roll: recommended  F[employer] Discharge handout (date given): -  Equipment at home/gym membership: nil  Other education provided:  Time Entry(in minutes):  Neuromuscular Re-Education Time Entry: 10  Therapeutic Exercise Time Entry: 45    Assessment & Plan   Assessment: Mehul returns reporting min stiffness without pain currently..Treatment continued with flexibility, strengthening and neuromuscular reeducation exs.  Added KB deadlift for functional strengthening and progressed resistance for cable cross chops.  He was able to perform ex's with min cues and without increased pain. Lumbar MedX resistance was increased to 87 ft/lbs and he completed reps with a RPE = 4/10. He completed peripheral strengthening ex's without c/o (chest press deferred). Will continue per HB protocol and patient tolerance.        The patient will continue to benefit from skilled outpatient physical therapy in order to address the deficits  listed in the problem list on the initial evaluation, provide patient and family education, and maximize the patients level of independence in the home and community environments.     The patient's spiritual, cultural, and educational needs were considered, and the patient is agreeable to the plan of care and goals.         Plan: Continue PT towards established plan of care and PT goals  Plan details: Healthy Back protocol 2/week for isolated medx strengthening, whole body strengthening, therapy, manual skills and modalities as needed      Active       Long term goal       Pt  will report a reduction in worst pain score by 1-2 points for improved tolerance for home and leisure activities with 10% increase in FOTO   (Progressing)       Start:  05/30/25    Expected End:  08/30/25            Education completed with patient in terms of spine care, pain neuroscience, self care, and discharge program.  Patient understands information given and feels ready to manage self care  (Progressing)       Start:  05/30/25    Expected End:  08/30/25               patient goals       reduced fear about activities.  Return to fishing and leisure. Feel confident in his ability to manage his back pain (Progressing)       Start:  05/30/25    Expected End:  08/30/25               short term       increased MedX ROM of 3 degrees from initial ROM value, and strength gains in isometric testing by 20 % with improved ability to perform functional motion while standing and sitting for activities of daily living, and carrying and lifting.     (Progressing)       Start:  05/30/25    Expected End:  07/30/25            Pt compliant with stretching daily and able to independently manage pain throughout a typical day.  Compliant with postural instructions to aid in ability to sit and travel in car, and perform activities of daily living.  (Progressing)       Start:  05/30/25    Expected End:  07/30/25                  Goals:   Active         Long  term goal         Pt  will report a reduction in worst pain score by 1-2 points for improved tolerance for home and leisure activities with 10% increase in FOTO           Start:  05/30/25    Expected End:  08/30/25              Education completed with patient in terms of spine care, pain neuroscience, self care, and discharge program.  Patient understands information given and feels ready to manage self care          Start:  05/30/25    Expected End:  08/30/25                 patient goals         reduced fear about activities.  Return to fishing and leisure. Feel confident in his ability to manage his back pain         Start:  05/30/25    Expected End:  08/30/25                 short term         increased MedX ROM of 3 degrees from initial ROM value, and strength gains in isometric testing by 20 % with improved ability to perform functional motion while standing and sitting for activities of daily living, and carrying and lifting.             Start:  05/30/25    Expected End:  07/30/25              Pt compliant with stretching daily and able to independently manage pain throughout a typical day.  Compliant with postural instructions to aid in ability to sit and travel in car, and perform activities of daily living.          Start:  05/30/25    Expected End:  07/30/25               Charles Moise PTA

## 2025-06-27 ENCOUNTER — OFFICE VISIT (OUTPATIENT)
Dept: URGENT CARE | Facility: CLINIC | Age: 55
End: 2025-06-27
Payer: COMMERCIAL

## 2025-06-27 VITALS
HEIGHT: 63 IN | DIASTOLIC BLOOD PRESSURE: 68 MMHG | BODY MASS INDEX: 22.5 KG/M2 | RESPIRATION RATE: 18 BRPM | SYSTOLIC BLOOD PRESSURE: 111 MMHG | OXYGEN SATURATION: 97 % | WEIGHT: 127 LBS | HEART RATE: 83 BPM

## 2025-06-27 DIAGNOSIS — M54.50 CHRONIC MIDLINE LOW BACK PAIN WITHOUT SCIATICA: Primary | ICD-10-CM

## 2025-06-27 DIAGNOSIS — S39.012D STRAIN OF LUMBAR REGION, SUBSEQUENT ENCOUNTER: ICD-10-CM

## 2025-06-27 DIAGNOSIS — G89.29 CHRONIC MIDLINE LOW BACK PAIN WITHOUT SCIATICA: Primary | ICD-10-CM

## 2025-06-27 DIAGNOSIS — G89.29 ENCOUNTER FOR CHRONIC PAIN MANAGEMENT: ICD-10-CM

## 2025-06-27 DIAGNOSIS — Z02.6 ENCOUNTER RELATED TO WORKER'S COMPENSATION CLAIM: ICD-10-CM

## 2025-06-27 RX ORDER — DICLOFENAC SODIUM 10 MG/G
2 GEL TOPICAL 4 TIMES DAILY
Qty: 150 G | Refills: 2 | Status: SHIPPED | OUTPATIENT
Start: 2025-06-27

## 2025-06-27 RX ORDER — NAPROXEN 500 MG/1
500 TABLET ORAL 2 TIMES DAILY PRN
Qty: 60 TABLET | Refills: 2 | Status: SHIPPED | OUTPATIENT
Start: 2025-06-27

## 2025-06-27 RX ORDER — TIZANIDINE 4 MG/1
TABLET ORAL
Qty: 20 TABLET | Refills: 2 | Status: SHIPPED | OUTPATIENT
Start: 2025-06-27

## 2025-06-27 NOTE — LETTER
[facility]  [address] [address]  Phone: [phone]  Fax: [phone]  [facility] [facility]: [phone]    Pt Name: Mehul Eisenberg  Injury Date: 08/25/2017   Employee ID: [id number] Date of Treatment: 06/27/2025   Company: [employer]      Appointment Time: 10:00 AM Arrived: 09:59 AM   Provider: Alma Delia Andrews MD Time Out:11:04 AM     Office Treatment:   1. Chronic midline low back pain without sciatica    2. Encounter related to worker's compensation claim    3. Strain of lumbar region, subsequent encounter    4. Encounter for chronic pain management      Medications Ordered This Encounter   Medications    diclofenac sodium (VOLTAREN) 1 % Gel    naproxen (NAPROSYN) 500 MG tablet    tiZANidine (ZANAFLEX) 4 MG tablet      Patient Instructions: Continue Physical Therapy    Restrictions: Regular Duty     Return Appointment: 8/29/2025 at 09:00 AM  [facility]

## 2025-06-27 NOTE — PROGRESS NOTES
Subjective:      Patient ID: Mehul Eisenberg is a 54 y.o. male.    Chief Complaint: Back Injury    Patient's place of employment - [employer]  Patient's job title -   Date of Injury - 08/25/17  Body part injured - Back  Current work status per last visit - Regular  Improved, same, or worse - Improved  Pain Scale right now (1-10) -  1/10    Patient states bending does cause pain then standing will continue the pain. Patient is doing PT which is helping greatly.    Back Pain  This is a new problem. The current episode started more than 1 month ago. The problem occurs intermittently. The problem has been gradually improving since onset. Quality: sharp. The pain does not radiate. The pain is at a severity of 1/10. The pain is mild. The pain is Worse during the day. The symptoms are aggravated by bending and standing. Stiffness is present All day. Pertinent negatives include no leg pain, numbness or tingling. He has tried muscle relaxant and heat (PT) for the symptoms. The treatment provided mild relief.       Musculoskeletal:  Positive for back pain.   Skin:  Negative for erythema.   Neurological:  Negative for numbness.       See MA note above. Begin MD note:    Mehul Eisenberg is a 54 y.o. presenting for follow-up of chronic back injury.  Recommendations from last visit: Healthy back program  Today, he reports he is in the healthy back program. He feels a bit stronger. He is doing the exercises they recommended at home, but there is a machine they use in treatment that he cannot do at home. He got a back support that was recommended and noted that he was able to sit for longer periods while doing a road-trip.   He continues to note increased pain with repeatedly lifting heavy things at work and with reaching while bent over.     Objective:     Physical Exam  Vitals and nursing note reviewed.   Constitutional:       General: He is not in acute distress.     Appearance: Normal appearance.   HENT:       Right Ear: External ear normal.      Left Ear: External ear normal.   Eyes:      Conjunctiva/sclera: Conjunctivae normal.   Cardiovascular:      Pulses: Normal pulses.   Pulmonary:      Effort: Pulmonary effort is normal.   Abdominal:      General: Abdomen is flat.   Musculoskeletal:         General: No swelling or tenderness.      Lumbar back: No swelling, deformity, spasms or tenderness. Normal range of motion. Negative right straight leg raise test and negative left straight leg raise test.        Back:       Comments: Today has mild low back pain that is nonradiating.  Occasionally has spasm to the left buttock.  Denies pain, numbness or tingling to lower extremities.  Able to forward flex to 90°, slowly.  More pain with extension 10°.  No pain with left/right lateral bending/rotation.  5/5 strength lower extremities.  Heel and toe walks well.  Negative bilateral straight leg raises.   Skin:     Findings: No bruising or erythema.   Neurological:      General: No focal deficit present.      Mental Status: He is alert and oriented to person, place, and time.   Psychiatric:         Mood and Affect: Mood normal.         Behavior: Behavior normal.         Thought Content: Thought content normal.         Judgment: Judgment normal.        Assessment:      1. Chronic midline low back pain without sciatica    2. Encounter related to worker's compensation claim    3. Strain of lumbar region, subsequent encounter    4. Encounter for chronic pain management      Plan:     Encouraged to establish a fitness routine independent of physical therapy.  Medications refilled. PT referral to complete the whole healthy back program submitted.  Follow up in approximately 3 months.    Medications Ordered This Encounter   Medications    diclofenac sodium (VOLTAREN) 1 % Gel     Sig: Apply 2 g topically 4 (four) times daily.     Dispense:  150 g     Refill:  2    naproxen (NAPROSYN) 500 MG tablet     Sig: Take 1 tablet (500 mg total) by  mouth 2 (two) times daily as needed (take with food).     Dispense:  60 tablet     Refill:  2    tiZANidine (ZANAFLEX) 4 MG tablet     Sig: TAKE 1 TABLET BY MOUTH EVERY NIGHT AS NEEDED FOR PAIN OR SPASM     Dispense:  20 tablet     Refill:  2     Diagnoses and plan discussed with the patient, as well as the expected course and duration of symptoms. Risks and benefits of any medication prescribed during this visit was explained, verbal instructions on use given. Clinic/Emergency department return precautions were given, can return to [facility] before scheduled follow-up appointment if notes worsening/aggravation of symptoms. All questions and concerns were addressed prior to discharge. Plan was developed with active input from the patient and they verbalized understanding of and agreement with the POC.  C was informed of any referrals and relevant orders.  Note was dictated with voice recognition software, please excuse any grammatical errors.    I spent a total of 30 minutes on the day of the visit.  This includes face to face time and non-face to face time preparing to see the patient (e.g. review of medical record), obtaining and/or reviewing separately obtained history, documenting clinical information in the electronic or other health record, independently interpreting results and communicating results to the patient/family/caregiver, or care coordinator.    Patient Instructions: Continue Physical Therapy   Restrictions: Regular Duty  Follow up in about 9 weeks (around 8/29/2025).

## 2025-07-01 ENCOUNTER — CLINICAL SUPPORT (OUTPATIENT)
Dept: REHABILITATION | Facility: HOSPITAL | Age: 55
End: 2025-07-01
Payer: COMMERCIAL

## 2025-07-01 DIAGNOSIS — R29.898 DECREASED STRENGTH OF TRUNK AND BACK: ICD-10-CM

## 2025-07-01 DIAGNOSIS — M25.69 DECREASED RANGE OF MOTION OF TRUNK AND BACK: Primary | ICD-10-CM

## 2025-07-01 PROCEDURE — 97112 NEUROMUSCULAR REEDUCATION: CPT | Mod: CQ

## 2025-07-01 PROCEDURE — 97110 THERAPEUTIC EXERCISES: CPT | Mod: CQ

## 2025-07-01 NOTE — PROGRESS NOTES
Outpatient Rehab    Physical Therapy Progress Note    Patient Name: Mehul Eisenberg  MRN: [id number]  YOB: 1970  Encounter Date: 7/1/2025    Therapy Diagnosis:   Encounter Diagnoses   Name Primary?    Decreased range of motion of trunk and back Yes    Decreased strength of trunk and back          Physician: Rachael Chiu NP    Physician Orders: Eval and Treat  Medical Diagnosis: Chronic midline low back pain without sciatica    Visit # / Visits Authorized: 9/12  Reassessment due 7/24/125  Insurance Authorization Period: 2/28/2025 to 2/28/2026  Date of Evaluation: 5/30/2025  Plan of Care Certification: 5/30/2025 to 8/29/2025      PT/PTA: PTA   Number of PTA visits since last PT visit:2  Time In: 0800   Time Out: 0855  Total Time (in minutes): 55  Total Billable Time (in minutes): 55    FOTO:  Intake Score:59 %  Survey Score 2: 62 %  Survey Score 3:  %    Precautions: standard       Subjective   Patient states that he is feeling pretty good this morning. He reports  min stiffness without c/o pain. States that treatments have been helpful..  Pain reported as 0/10.       Work Information:   Occupation/job title: shipping personal in [employer]  Current work restrictions: full duty  Previous work status: light duty 2017  Current work status: full     Functional Job Specific Testing: per patient, identifying tasks that are challenging for him  Job Specific Task Job Demands Current Ability   1. 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 Sitting on floor to assemble items Does this 12/year Able to do   3. Reaching  Reaching at belt     4. Mopping 1/week at home  5/year at work Functioning through it      Objective          Isometric Testing on Med X equipment: Testing administered by PT    Test Initial Baseline Midpoint Final   Date 6/3/25     ROM 0-48 deg     Max Peak Torque 130      Min Peak Torque 74      Flex/Ext  Ratio 1.7:1     % variance  normative data 49     % change from initial test N/A visit 1         MOVEMENT LOSS - Lumbar    Norms ROM Loss Initial 6/24/25   Flexion Fingers touch toes, sacral angle >/= 70 deg, uniform spinal curvature, posterior weight shift  minimal loss, pain increased  end range, no worse Min loss, no complaints of pain   Extension ASIS surpasses toes, spine of scapulae surpasses heels, uniform spinal curve moderate loss, pain increased  end range, no worse Min loss, tolerating much improved, end range discomfort no worse   Side glide Right   moderate loss Min loss   Side glide Left   moderate loss Min loss   Rotation Right PT observes contralateral shoulder moderate loss Min loss   Rotation Left PT observes contralateral shoulder moderate loss Min loss      Outcomes:  Intake Score: 59%  Visit 6 Score: 62  Visit 10 Score:   Discharge Score:  Goal Score: 66%        Treatment:       Mehul received neuromuscular education  to isolate and engage spinal stabilization musculature correctly for motor control and coordination to aid in function and posture for 10 minutes on the Medical Medx Machine.  Patient performed MedX dynamic exercise with emphasis on spinal muscular control using pacer throughout  active range of motion. Therapist assisted patient in achieving optimal exertion for neural reeducation and endurance training by using the  Dominique Exertion Rating scale, by instructing the patient to aim for mid range of exertion, performing 15-20 repetitions, slowly, correctly,and safely.           7/1/2025     8:08 AM   [facility] - [facility]   Visit Number 9   VAS Pain Rating 0   Time 5   Extension in Lying 20   Extension in Standing 10   Lumbar Weight 87 lbs   Repetitions 18   Rating of Perceived Exertion 3          Patient participated in therapeutic exercises to develop strength, endurance, ROM, flexibility, posture, and core stabilization for 45 minutes including:     Ext in standing x 10   ext in  lie 10x/ 10x c/ exhale   cat/camel 10 reps  Bird dogs row w/9#  10x    LTR x 10  Open books x 10 w RTB  Bridges x 20xBTB  BKFO c/ BTB  x 15  Paloff press w/ lift 15# x 10  Cable cross chops (Short bar) 25# x 10, lifts with rope 15# x 10  +20# KB deadlift x 10    Peripheral muscle strengthening which included one set of 15-20 repetitions at a slow and controlled 10-13 second per rep pace focused on strengthening supporting musculature in order to improve body mechanics and functional mobility. Patient and therapist focused on proper form during treatment to ensure optimal strengthening of each targeted muscle group.  Machines utilized included:  torso rot/ hip abd/hip add/leg press, leg ext, leg curl, rows, tricep ext, bicep curls and chest press    Pt given cold pack for 5 minutes to LB in supine.    Patient Education and Home Exercises      Home exercises include:  Ext in standing  Ext in lie  Cat/camel  Progress hip stretching and core work  Cardio program (V5): - 6/12/25 Benefits of cardio handout reviewed and provided  Lifting education (V11): -  Posture/Lumbar roll: recommended  Fridge Magnet Discharge handout (date given): -  Equipment at home/gym membership: nil  Other education provided:  Time Entry(in minutes):  Neuromuscular Re-Education Time Entry: 10  Therapeutic Exercise Time Entry: 45    Assessment & Plan   Assessment: Mehul returns reporting min stiffness without pain currently..Treatment continued with flexibility, strengthening and neuromuscular reeducation exs.  Added Bird dog row, resistance with open book ex and increased resistance for KB deadlifts.  He was able to perform ex's with min cues and without increased pain. Lumbar MedX resistance was maintained at 87 ft/lbs and he completed 18 reps with a RPE = 3/10. He completed peripheral strengthening ex's without c/o (chest press deferred). Will continue per HB protocol and patient tolerance.        The patient will continue to benefit from skilled  outpatient physical therapy in order to address the deficits listed in the problem list on the initial evaluation, provide patient and family education, and maximize the patients level of independence in the home and community environments.     The patient's spiritual, cultural, and educational needs were considered, and the patient is agreeable to the plan of care and goals.         Plan: Continue PT towards established plan of care and PT goals  Plan details: [facility] protocol 2/week for isolated medx strengthening, whole body strengthening, therapy, manual skills and modalities as needed      Active       Long term goal       Pt  will report a reduction in worst pain score by 1-2 points for improved tolerance for home and leisure activities with 10% increase in FOTO   (Progressing)       Start:  05/30/25    Expected End:  08/30/25            Education completed with patient in terms of spine care, pain neuroscience, self care, and discharge program.  Patient understands information given and feels ready to manage self care  (Progressing)       Start:  05/30/25    Expected End:  08/30/25               patient goals       reduced fear about activities.  Return to fishing and leisure. Feel confident in his ability to manage his back pain (Progressing)       Start:  05/30/25    Expected End:  08/30/25               short term       increased MedX ROM of 3 degrees from initial ROM value, and strength gains in isometric testing by 20 % with improved ability to perform functional motion while standing and sitting for activities of daily living, and carrying and lifting.     (Progressing)       Start:  05/30/25    Expected End:  07/30/25            Pt compliant with stretching daily and able to independently manage pain throughout a typical day.  Compliant with postural instructions to aid in ability to sit and travel in car, and perform activities of daily living.  (Progressing)       Start:  05/30/25    Expected  End:  07/30/25                  Goals:   Active         Long term goal         Pt  will report a reduction in worst pain score by 1-2 points for improved tolerance for home and leisure activities with 10% increase in FOTO           Start:  05/30/25    Expected End:  08/30/25              Education completed with patient in terms of spine care, pain neuroscience, self care, and discharge program.  Patient understands information given and feels ready to manage self care          Start:  05/30/25    Expected End:  08/30/25                 patient goals         reduced fear about activities.  Return to fishing and leisure. Feel confident in his ability to manage his back pain         Start:  05/30/25    Expected End:  08/30/25                 short term         increased MedX ROM of 3 degrees from initial ROM value, and strength gains in isometric testing by 20 % with improved ability to perform functional motion while standing and sitting for activities of daily living, and carrying and lifting.             Start:  05/30/25    Expected End:  07/30/25              Pt compliant with stretching daily and able to independently manage pain throughout a typical day.  Compliant with postural instructions to aid in ability to sit and travel in car, and perform activities of daily living.          Start:  05/30/25    Expected End:  07/30/25               Charles Moise PTA

## 2025-07-03 ENCOUNTER — CLINICAL SUPPORT (OUTPATIENT)
Dept: REHABILITATION | Facility: HOSPITAL | Age: 55
End: 2025-07-03
Payer: COMMERCIAL

## 2025-07-03 DIAGNOSIS — R29.898 DECREASED STRENGTH OF TRUNK AND BACK: ICD-10-CM

## 2025-07-03 DIAGNOSIS — M25.69 DECREASED RANGE OF MOTION OF TRUNK AND BACK: Primary | ICD-10-CM

## 2025-07-03 PROCEDURE — 97750 PHYSICAL PERFORMANCE TEST: CPT

## 2025-07-03 PROCEDURE — 97110 THERAPEUTIC EXERCISES: CPT

## 2025-07-03 NOTE — PROGRESS NOTES
Outpatient Rehab    Physical Therapy Progress Note    Patient Name: Mehul Eisenberg  MRN: [id number]  YOB: 1970  Encounter Date: 7/3/2025    Therapy Diagnosis:   Encounter Diagnoses   Name Primary?    Decreased range of motion of trunk and back Yes    Decreased strength of trunk and back          Physician: Rachael Chiu NP    Physician Orders: Eval and Treat  Medical Diagnosis: Chronic midline low back pain without sciatica    Visit # / Visits Authorized: 10/12  Reassessment due 7/24/125  Insurance Authorization Period: 2/28/2025 to 2/28/2026  Date of Evaluation: 5/30/2025  Plan of Care Certification: 5/30/2025 to 8/29/2025      PT/PTA:     Number of PTA visits since last PT visit:   Time In:   8  Time Out:  9  Total Time (in minutes):  60  Total Billable Time (in minutes):  55    FOTO:  Intake Score:59 %  Survey Score 2: 62 %  Survey Score 3:  %    Precautions: standard       Subjective   Pt reports he is feeling stronger since starting the program.  Pain reported as 0/10.       Work Information:   Occupation/job title: shipping personal in [employer]  Current work restrictions: full duty  Previous work status: light duty 2017  Current work status: full     Functional Job Specific Testing: per patient, identifying tasks that are challenging for him  Job Specific Task Job Demands Current Ability   1. 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 Sitting on floor to assemble items Does this 12/year Able to do   3. Reaching  Reaching at belt     4. Mopping 1/week at home  5/year at work Functioning through it      Objective          Isometric Testing on Med X equipment: Testing administered by PT    Test Initial Baseline Midpoint Final   Date 6/3/25 7/3/25    ROM 0-48 deg 0-54    Max Peak Torque 130  194    Min Peak Torque 74  93    Flex/Ext Ratio 1.7:1 2.0:1    % variance  normative data 49 33%    % change from  initial test N/A visit 1 32        MOVEMENT LOSS - Lumbar    Norms ROM Loss Initial 6/24/25   Flexion Fingers touch toes, sacral angle >/= 70 deg, uniform spinal curvature, posterior weight shift  minimal loss, pain increased  end range, no worse Min loss, no complaints of pain   Extension ASIS surpasses toes, spine of scapulae surpasses heels, uniform spinal curve moderate loss, pain increased  end range, no worse Min loss, tolerating much improved, end range discomfort no worse   Side glide Right   moderate loss Min loss   Side glide Left   moderate loss Min loss   Rotation Right PT observes contralateral shoulder moderate loss Min loss   Rotation Left PT observes contralateral shoulder moderate loss Min loss      Outcomes:  Intake Score: 59%  Visit 6 Score: 62  Visit 10 Score: 72  Discharge Score:  Goal Score: 66%        Treatment:       Mehul received neuromuscular education  to isolate and engage spinal stabilization musculature correctly for motor control and coordination to aid in function and posture for 10 minutes on the Medical Medx Machine.  Patient performed MedX dynamic exercise with emphasis on spinal muscular control using pacer throughout  active range of motion. Therapist assisted patient in achieving optimal exertion for neural reeducation and endurance training by using the  Dominique Exertion Rating scale, by instructing the patient to aim for mid range of exertion, performing 15-20 repetitions, slowly, correctly,and safely.           7/3/2025     8:36 AM   [facility]   Visit Number 10   VAS Pain Rating 0   Time 5   Extension in Lying 20   Extension in Standing 10   Lumbar Flexion 54   Lumbar Extension 0   Lumbar Peak Torque 194 ft. lbs.   Min Torque 93   Test Percent Below Normative Data 33 %   Test Percent Gain in Strength from Initial  32 %   Ice - Z Lie (in min.) 5       Patient participated in therapeutic exercises to develop strength, endurance, ROM, flexibility, posture, and core  stabilization for 50 minutes including:     Ext in standing x 10   ext in lie 10x/ 10x c/ exhale   cat/camel 10 reps  Bird dogs row w/9#  10x    LTR x 10  Open books x 10 w RTB  Bridges x 20xBTB  BKFO c/ BTB  x 15  Paloff press w/ lift 15# x 10  Cable cross chops (Short bar) 25# x 10, lifts with rope 15# x 10  +20# KB deadlift x 10    Peripheral muscle strengthening which included one set of 15-20 repetitions at a slow and controlled 10-13 second per rep pace focused on strengthening supporting musculature in order to improve body mechanics and functional mobility. Patient and therapist focused on proper form during treatment to ensure optimal strengthening of each targeted muscle group.  Machines utilized included:  torso rot/ hip abd/hip add/leg press, leg ext, leg curl, rows, tricep ext, bicep curls and chest press    Pt given cold pack for 5 minutes to LB in supine.    Patient Education and Home Exercises      Home exercises include:  Ext in standing  Ext in lie  Cat/camel  Progress hip stretching and core work  Cardio program (V5): - 6/12/25 Benefits of cardio handout reviewed and provided  Lifting education (V11): -  Posture/Lumbar roll: recommended  Fridge Magnet Discharge handout (date given): -  Equipment at home/gym membership: nil  Other education provided:  Time Entry(in minutes):  Physical Performance Test (with Report) Time Entry: 10  Therapeutic Exercise Time Entry: 50    Assessment & Plan   Assessment:       Patient has attended 10 visits at [facility] which included MD evaluation, PT evaluation with isometric testing, and physical therapy treatment including HEP instruction, education, aerobic activity, dynamic strengthening on MedX equipment for the spine, and whole body strengthening on MedX equipment with increasing resistance. Patient demonstrates increased ability to reduce symptoms, improve posture, improve ROM, and improve strength, as stated below:    -Improved 6 ROM, initially on  MedX test 0-48 and currently 0-54.  -Improved strength at each test point on lumbar MedX isometric test with 32 average improvement noted with reduced pain noted by patient.  -Initial outcome tool score 59 and current outcome tool score 72 indicating reduced pain and improved function.       The patient will continue to benefit from skilled outpatient physical therapy in order to address the deficits listed in the problem list on the initial evaluation, provide patient and family education, and maximize the patients level of independence in the home and community environments.     The patient's spiritual, cultural, and educational needs were considered, and the patient is agreeable to the plan of care and goals.         Plan:    Plan details: [facility] protocol 2/week for isolated medx strengthening, whole body strengthening, therapy, manual skills and modalities as needed      Active       Long term goal       Pt  will report a reduction in worst pain score by 1-2 points for improved tolerance for home and leisure activities with 10% increase in FOTO   (Progressing)       Start:  05/30/25    Expected End:  08/30/25            Education completed with patient in terms of spine care, pain neuroscience, self care, and discharge program.  Patient understands information given and feels ready to manage self care  (Progressing)       Start:  05/30/25    Expected End:  08/30/25               patient goals       reduced fear about activities.  Return to fishing and leisure. Feel confident in his ability to manage his back pain (Progressing)       Start:  05/30/25    Expected End:  08/30/25               short term       increased MedX ROM of 3 degrees from initial ROM value, and strength gains in isometric testing by 20 % with improved ability to perform functional motion while standing and sitting for activities of daily living, and carrying and lifting.     (Progressing)       Start:  05/30/25    Expected End:   07/30/25            Pt compliant with stretching daily and able to independently manage pain throughout a typical day.  Compliant with postural instructions to aid in ability to sit and travel in car, and perform activities of daily living.  (Progressing)       Start:  05/30/25    Expected End:  07/30/25                  Goals:   Active         Long term goal         Pt  will report a reduction in worst pain score by 1-2 points for improved tolerance for home and leisure activities with 10% increase in FOTO           Start:  05/30/25    Expected End:  08/30/25              Education completed with patient in terms of spine care, pain neuroscience, self care, and discharge program.  Patient understands information given and feels ready to manage self care          Start:  05/30/25    Expected End:  08/30/25                 patient goals         reduced fear about activities.  Return to fishing and leisure. Feel confident in his ability to manage his back pain         Start:  05/30/25    Expected End:  08/30/25                 short term         increased MedX ROM of 3 degrees from initial ROM value, and strength gains in isometric testing by 20 % with improved ability to perform functional motion while standing and sitting for activities of daily living, and carrying and lifting.             Start:  05/30/25    Expected End:  07/30/25              Pt compliant with stretching daily and able to independently manage pain throughout a typical day.  Compliant with postural instructions to aid in ability to sit and travel in car, and perform activities of daily living.          Start:  05/30/25    Expected End:  07/30/25               Mitali Springer, PT

## 2025-07-08 ENCOUNTER — CLINICAL SUPPORT (OUTPATIENT)
Dept: REHABILITATION | Facility: HOSPITAL | Age: 55
End: 2025-07-08
Payer: COMMERCIAL

## 2025-07-08 DIAGNOSIS — M25.69 DECREASED RANGE OF MOTION OF TRUNK AND BACK: Primary | ICD-10-CM

## 2025-07-08 DIAGNOSIS — R29.898 DECREASED STRENGTH OF TRUNK AND BACK: ICD-10-CM

## 2025-07-08 PROCEDURE — 97110 THERAPEUTIC EXERCISES: CPT

## 2025-07-08 PROCEDURE — 97112 NEUROMUSCULAR REEDUCATION: CPT

## 2025-07-08 NOTE — PROGRESS NOTES
Outpatient Rehab    Physical Therapy Progress Note    Patient Name: Mehul Eisenberg  MRN: [id number]  YOB: 1970  Encounter Date: 7/8/2025    Therapy Diagnosis:   Encounter Diagnoses   Name Primary?    Decreased range of motion of trunk and back Yes    Decreased strength of trunk and back          Physician: Rachael Chiu NP    Physician Orders: Eval and Treat  Medical Diagnosis: Chronic midline low back pain without sciatica    Visit # / Visits Authorized: 11/12  Reassessment due 7/24/125  Insurance Authorization Period: 2/28/2025 to 2/28/2026  Date of Evaluation: 5/30/2025  Plan of Care Certification: 5/30/2025 to 8/29/2025      PT/PTA:     Number of PTA visits since last PT visit:   Time In:   8  Time Out:  9  Total Time (in minutes):  60  Total Billable Time (in minutes):  55    FOTO:  Intake Score:59 %  Survey Score 2: 62 %  Survey Score 3:  %    Precautions: standard       Subjective   no c/o pain this AM.  Pain reported as 0/10.       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 Sitting on floor to assemble items Does this 12/year Able to do   3. Reaching  Reaching at belt     4. Mopping 1/week at home  5/year at work Functioning through it      Objective          Isometric Testing on Med X equipment: Testing administered by PT    Test Initial Baseline Midpoint Final   Date 6/3/25 7/3/25    ROM 0-48 deg 0-54    Max Peak Torque 130  194    Min Peak Torque 74  93    Flex/Ext Ratio 1.7:1 2.0:1    % variance  normative data 49 33%    % change from initial test N/A visit 1 32        MOVEMENT  LOSS - Lumbar    Norms ROM Loss Initial 6/24/25   Flexion Fingers touch toes, sacral angle >/= 70 deg, uniform spinal curvature, posterior weight shift  minimal loss, pain increased  end range, no worse Min loss, no complaints of pain   Extension ASIS surpasses toes, spine of scapulae surpasses heels, uniform spinal curve moderate loss, pain increased  end range, no worse Min loss, tolerating much improved, end range discomfort no worse   Side glide Right   moderate loss Min loss   Side glide Left   moderate loss Min loss   Rotation Right PT observes contralateral shoulder moderate loss Min loss   Rotation Left PT observes contralateral shoulder moderate loss Min loss      Outcomes:  Intake Score: 59%  Visit 6 Score: 62  Visit 10 Score: 72  Discharge Score:  Goal Score: 66%        Treatment:       Mehul received neuromuscular education  to isolate and engage spinal stabilization musculature correctly for motor control and coordination to aid in function and posture for 10 minutes on the Medical Medx Machine.  Patient performed MedX dynamic exercise with emphasis on spinal muscular control using pacer throughout  active range of motion. Therapist assisted patient in achieving optimal exertion for neural reeducation and endurance training by using the  Dominique Exertion Rating scale, by instructing the patient to aim for mid range of exertion, performing 15-20 repetitions, slowly, correctly,and safely.           7/8/2025     8:29 AM   [facility]   Visit Number 11   VAS Pain Rating 0   Time 5   Extension in Lying 20   Extension in Standing 10   Lumbar Weight 87 lbs   Repetitions 20   Rating of Perceived Exertion 4   Ice - Z Lie (in min.) 5         Patient participated in therapeutic exercises to develop strength, endurance, ROM, flexibility, posture, and core stabilization for 50 minutes including:     Ext in standing x 10   ext in lie 10x/ 10x c/ exhale   cat/camel 10 reps  Bird dogs row w/10#  10x    LTR x  10  Open books x 10 w RTB  Bridges x 10xBTB/ hip abd 10x/btb  SL planks c/ clams BTB 10x  BKFO c/ BTB  x 15 N/P  Bridges c/ cook band pulldown 10x  Paloff press w/ lift 15# x 10  Cable cross chops (Short bar) 25# x 10, lifts with rope 15# x 10  +20# KB deadlift x 10    Peripheral muscle strengthening which included one set of 15-20 repetitions at a slow and controlled 10-13 second per rep pace focused on strengthening supporting musculature in order to improve body mechanics and functional mobility. Patient and therapist focused on proper form during treatment to ensure optimal strengthening of each targeted muscle group.  Machines utilized included:  torso rot/ hip abd/hip add/leg press, leg ext, leg curl, rows, tricep ext, bicep curls and chest press    Pt given cold pack for 5 minutes to LB in supine.    Patient Education and Home Exercises      Home exercises include:  Ext in standing  Ext in lie  Cat/camel  Progress hip stretching and core work  Cardio program (V5): - 6/12/25 Benefits of cardio handout reviewed and provided  Lifting education (V11): -  Posture/Lumbar roll: recommended  Fridge Magnet Discharge handout (date given): -  Equipment at home/gym membership: nil  Other education provided:  Time Entry(in minutes):       Assessment & Plan   Assessment: Pt arrives without pain today.  Continued with Lumbo pelvic stabilization activities.  Added SL planks/clams c/ BTB, OH pulldown c/ cook band and hip abd with bridges.  Pt able to perform all progressions without difficulty.  Med X weight maintained at 87ft/lbs with pt completing 20 reps at 4/10 exertion level.        The patient will continue to benefit from skilled outpatient physical therapy in order to address the deficits listed in the problem list on the initial evaluation, provide patient and family education, and maximize the patients level of independence in the home and community environments.     The patient's spiritual, cultural, and  educational needs were considered, and the patient is agreeable to the plan of care and goals.         Plan:    Plan details: [facility] protocol 2/week for isolated medx strengthening, whole body strengthening, therapy, manual skills and modalities as needed      Active       Long term goal       Pt  will report a reduction in worst pain score by 1-2 points for improved tolerance for home and leisure activities with 10% increase in FOTO   (Progressing)       Start:  05/30/25    Expected End:  08/30/25            Education completed with patient in terms of spine care, pain neuroscience, self care, and discharge program.  Patient understands information given and feels ready to manage self care  (Progressing)       Start:  05/30/25    Expected End:  08/30/25               patient goals       reduced fear about activities.  Return to fishing and leisure. Feel confident in his ability to manage his back pain (Progressing)       Start:  05/30/25    Expected End:  08/30/25               short term       increased MedX ROM of 3 degrees from initial ROM value, and strength gains in isometric testing by 20 % with improved ability to perform functional motion while standing and sitting for activities of daily living, and carrying and lifting.     (Progressing)       Start:  05/30/25    Expected End:  07/30/25            Pt compliant with stretching daily and able to independently manage pain throughout a typical day.  Compliant with postural instructions to aid in ability to sit and travel in car, and perform activities of daily living.  (Progressing)       Start:  05/30/25    Expected End:  07/30/25                  Goals:   Active         Long term goal         Pt  will report a reduction in worst pain score by 1-2 points for improved tolerance for home and leisure activities with 10% increase in FOTO           Start:  05/30/25    Expected End:  08/30/25              Education completed with patient in terms of spine  care, pain neuroscience, self care, and discharge program.  Patient understands information given and feels ready to manage self care          Start:  05/30/25    Expected End:  08/30/25                 patient goals         reduced fear about activities.  Return to fishing and leisure. Feel confident in his ability to manage his back pain         Start:  05/30/25    Expected End:  08/30/25                 short term         increased MedX ROM of 3 degrees from initial ROM value, and strength gains in isometric testing by 20 % with improved ability to perform functional motion while standing and sitting for activities of daily living, and carrying and lifting.             Start:  05/30/25    Expected End:  07/30/25              Pt compliant with stretching daily and able to independently manage pain throughout a typical day.  Compliant with postural instructions to aid in ability to sit and travel in car, and perform activities of daily living.          Start:  05/30/25    Expected End:  07/30/25               Mitali Springer, PT

## 2025-09-04 ENCOUNTER — OFFICE VISIT (OUTPATIENT)
Dept: URGENT CARE | Facility: CLINIC | Age: 55
End: 2025-09-04
Payer: COMMERCIAL

## 2025-09-04 VITALS
WEIGHT: 127 LBS | HEIGHT: 63 IN | SYSTOLIC BLOOD PRESSURE: 106 MMHG | OXYGEN SATURATION: 98 % | HEART RATE: 77 BPM | BODY MASS INDEX: 22.5 KG/M2 | RESPIRATION RATE: 18 BRPM | DIASTOLIC BLOOD PRESSURE: 75 MMHG

## 2025-09-04 DIAGNOSIS — G89.29 CHRONIC MIDLINE LOW BACK PAIN WITHOUT SCIATICA: Primary | ICD-10-CM

## 2025-09-04 DIAGNOSIS — M54.50 CHRONIC MIDLINE LOW BACK PAIN WITHOUT SCIATICA: Primary | ICD-10-CM

## 2025-09-04 DIAGNOSIS — Z02.6 ENCOUNTER RELATED TO WORKER'S COMPENSATION CLAIM: ICD-10-CM

## 2025-09-04 DIAGNOSIS — S39.012D STRAIN OF LUMBAR REGION, SUBSEQUENT ENCOUNTER: ICD-10-CM
